# Patient Record
Sex: MALE | Race: ASIAN | Employment: OTHER | URBAN - METROPOLITAN AREA
[De-identification: names, ages, dates, MRNs, and addresses within clinical notes are randomized per-mention and may not be internally consistent; named-entity substitution may affect disease eponyms.]

---

## 2017-01-01 ENCOUNTER — APPOINTMENT (OUTPATIENT)
Dept: CARDIOLOGY | Facility: CLINIC | Age: 82
DRG: 208 | End: 2017-01-01
Attending: ANESTHESIOLOGY
Payer: MEDICARE

## 2017-01-01 ENCOUNTER — ANESTHESIA EVENT (OUTPATIENT)
Dept: INTENSIVE CARE | Facility: CLINIC | Age: 82
DRG: 208 | End: 2017-01-01
Payer: MEDICARE

## 2017-01-01 ENCOUNTER — OFFICE VISIT (OUTPATIENT)
Dept: INTERPRETER SERVICES | Facility: CLINIC | Age: 82
End: 2017-01-01

## 2017-01-01 ENCOUNTER — APPOINTMENT (OUTPATIENT)
Dept: CARDIOLOGY | Facility: CLINIC | Age: 82
DRG: 208 | End: 2017-01-01
Attending: INTERNAL MEDICINE
Payer: MEDICARE

## 2017-01-01 ENCOUNTER — APPOINTMENT (OUTPATIENT)
Dept: SPEECH THERAPY | Facility: CLINIC | Age: 82
DRG: 208 | End: 2017-01-01
Payer: MEDICARE

## 2017-01-01 ENCOUNTER — APPOINTMENT (OUTPATIENT)
Dept: GENERAL RADIOLOGY | Facility: CLINIC | Age: 82
DRG: 208 | End: 2017-01-01
Attending: INTERNAL MEDICINE
Payer: MEDICARE

## 2017-01-01 ENCOUNTER — TRANSFERRED RECORDS (OUTPATIENT)
Dept: HEALTH INFORMATION MANAGEMENT | Facility: CLINIC | Age: 82
End: 2017-01-01

## 2017-01-01 ENCOUNTER — APPOINTMENT (OUTPATIENT)
Dept: SPEECH THERAPY | Facility: CLINIC | Age: 82
DRG: 208 | End: 2017-01-01
Attending: INTERNAL MEDICINE
Payer: MEDICARE

## 2017-01-01 ENCOUNTER — APPOINTMENT (OUTPATIENT)
Dept: GENERAL RADIOLOGY | Facility: CLINIC | Age: 82
DRG: 208 | End: 2017-01-01
Attending: EMERGENCY MEDICINE
Payer: MEDICARE

## 2017-01-01 ENCOUNTER — APPOINTMENT (OUTPATIENT)
Dept: CT IMAGING | Facility: CLINIC | Age: 82
DRG: 208 | End: 2017-01-01
Attending: INTERNAL MEDICINE
Payer: MEDICARE

## 2017-01-01 ENCOUNTER — HOSPITAL ENCOUNTER (INPATIENT)
Facility: CLINIC | Age: 82
LOS: 12 days | DRG: 208 | End: 2017-04-24
Attending: EMERGENCY MEDICINE | Admitting: INTERNAL MEDICINE
Payer: MEDICARE

## 2017-01-01 ENCOUNTER — ALLIED HEALTH/NURSE VISIT (OUTPATIENT)
Dept: NURSING | Facility: CLINIC | Age: 82
End: 2017-01-01
Payer: MEDICARE

## 2017-01-01 ENCOUNTER — HOSPITAL ENCOUNTER (OUTPATIENT)
Dept: CT IMAGING | Facility: CLINIC | Age: 82
Discharge: HOME | End: 2017-03-14
Attending: INTERNAL MEDICINE | Admitting: INTERNAL MEDICINE
Payer: MEDICARE

## 2017-01-01 ENCOUNTER — APPOINTMENT (OUTPATIENT)
Dept: ULTRASOUND IMAGING | Facility: CLINIC | Age: 82
DRG: 208 | End: 2017-01-01
Attending: INTERNAL MEDICINE
Payer: MEDICARE

## 2017-01-01 ENCOUNTER — ANESTHESIA (OUTPATIENT)
Dept: INTENSIVE CARE | Facility: CLINIC | Age: 82
DRG: 208 | End: 2017-01-01
Payer: MEDICARE

## 2017-01-01 VITALS
BODY MASS INDEX: 40.73 KG/M2 | HEART RATE: 92 BPM | RESPIRATION RATE: 20 BRPM | DIASTOLIC BLOOD PRESSURE: 57 MMHG | TEMPERATURE: 100.6 F | SYSTOLIC BLOOD PRESSURE: 113 MMHG | WEIGHT: 207.45 LBS | OXYGEN SATURATION: 97 % | HEIGHT: 60 IN

## 2017-01-01 VITALS — WEIGHT: 183.8 LBS | DIASTOLIC BLOOD PRESSURE: 62 MMHG | SYSTOLIC BLOOD PRESSURE: 124 MMHG | BODY MASS INDEX: 33.61 KG/M2

## 2017-01-01 DIAGNOSIS — R06.9 ABNORMAL BREATHING: ICD-10-CM

## 2017-01-01 DIAGNOSIS — J18.9 PNEUMONIA OF LEFT LOWER LOBE DUE TO INFECTIOUS ORGANISM: ICD-10-CM

## 2017-01-01 DIAGNOSIS — N18.4 CHRONIC RENAL DISEASE, STAGE IV (H): ICD-10-CM

## 2017-01-01 DIAGNOSIS — D64.89 OTHER SPECIFIED ANEMIAS: Primary | ICD-10-CM

## 2017-01-01 DIAGNOSIS — R05.9 COUGH: ICD-10-CM

## 2017-01-01 DIAGNOSIS — Z01.30 BP CHECK: Primary | ICD-10-CM

## 2017-01-01 DIAGNOSIS — I10 HYPERTENSION, ESSENTIAL: ICD-10-CM

## 2017-01-01 DIAGNOSIS — I10 ESSENTIAL HYPERTENSION WITH GOAL BLOOD PRESSURE LESS THAN 140/90: ICD-10-CM

## 2017-01-01 DIAGNOSIS — R79.89 ELEVATED TROPONIN: ICD-10-CM

## 2017-01-01 DIAGNOSIS — R06.00 DYSPNEA: ICD-10-CM

## 2017-01-01 LAB
1,3 BETA GLUCAN SER-MCNC: NORMAL NG/ML
ABO + RH BLD: NORMAL
ABO + RH BLD: NORMAL
ACID FAST STN SPEC QL: NORMAL
ACID FAST STN SPEC QL: NORMAL
ALBUMIN SERPL-MCNC: 2 G/DL (ref 3.4–5)
ALBUMIN SERPL-MCNC: 2.4 G/DL (ref 3.4–5)
ALBUMIN SERPL-MCNC: 3 G/DL (ref 3.4–5)
ALBUMIN SERPL-MCNC: 3.2 G/DL (ref 3.4–5)
ALBUMIN SERPL-MCNC: 3.3 G/DL (ref 3.4–5)
ALBUMIN SERPL-MCNC: 3.3 G/DL (ref 3.4–5)
ALBUMIN UR-MCNC: 100 MG/DL
ALP SERPL-CCNC: 138 U/L (ref 40–150)
ALP SERPL-CCNC: 159 U/L (ref 40–150)
ALP SERPL-CCNC: 165 U/L (ref 40–150)
ALP SERPL-CCNC: 166 U/L (ref 40–150)
ALT SERPL W P-5'-P-CCNC: 1066 U/L (ref 0–70)
ALT SERPL W P-5'-P-CCNC: 966 U/L (ref 0–70)
ALT SERPL W P-5'-P-CCNC: 981 U/L (ref 0–70)
ALT SERPL W P-5'-P-CCNC: 994 U/L (ref 0–70)
ANA SER QL IA: NORMAL
ANCA IGG TITR SER IF: NORMAL {TITER}
ANION GAP SERPL CALCULATED.3IONS-SCNC: 10 MMOL/L (ref 3–14)
ANION GAP SERPL CALCULATED.3IONS-SCNC: 11 MMOL/L (ref 3–14)
ANION GAP SERPL CALCULATED.3IONS-SCNC: 12 MMOL/L (ref 3–14)
ANION GAP SERPL CALCULATED.3IONS-SCNC: 13 MMOL/L (ref 3–14)
ANION GAP SERPL CALCULATED.3IONS-SCNC: 14 MMOL/L (ref 3–14)
ANION GAP SERPL CALCULATED.3IONS-SCNC: 15 MMOL/L (ref 3–14)
ANION GAP SERPL CALCULATED.3IONS-SCNC: 17 MMOL/L (ref 3–14)
ANION GAP SERPL CALCULATED.3IONS-SCNC: 18 MMOL/L (ref 3–14)
ANION GAP SERPL CALCULATED.3IONS-SCNC: 20 MMOL/L (ref 3–14)
ANION GAP SERPL CALCULATED.3IONS-SCNC: 21 MMOL/L (ref 3–14)
ANION GAP SERPL CALCULATED.3IONS-SCNC: 6 MMOL/L (ref 3–14)
ANION GAP SERPL CALCULATED.3IONS-SCNC: 7 MMOL/L (ref 3–14)
ANION GAP SERPL CALCULATED.3IONS-SCNC: 7 MMOL/L (ref 3–14)
ANION GAP SERPL CALCULATED.3IONS-SCNC: 8 MMOL/L (ref 3–14)
ANION GAP SERPL CALCULATED.3IONS-SCNC: 9 MMOL/L (ref 3–14)
ANISOCYTOSIS BLD QL SMEAR: SLIGHT
APPEARANCE FLD: NORMAL
APPEARANCE UR: ABNORMAL
APTT PPP: 33 SEC (ref 22–37)
APTT PPP: 42 SEC (ref 22–37)
ASPERGILLUS AB TITR SER CF: NORMAL {TITER}
AST SERPL W P-5'-P-CCNC: 2572 U/L (ref 0–45)
AST SERPL W P-5'-P-CCNC: 3467 U/L (ref 0–45)
AST SERPL W P-5'-P-CCNC: 3766 U/L (ref 0–45)
AST SERPL W P-5'-P-CCNC: 4108 U/L (ref 0–45)
B DERMAT AB TITR SER CF: NORMAL {TITER}
B-D GLUCAN INTERPRETATION (1,3): NORMAL
BACTERIA #/AREA URNS HPF: ABNORMAL /HPF
BACTERIA SPEC CULT: ABNORMAL
BACTERIA SPEC CULT: NO GROWTH
BASE DEFICIT BLDA-SCNC: 10.7 MMOL/L
BASE DEFICIT BLDA-SCNC: 11.3 MMOL/L
BASE DEFICIT BLDA-SCNC: 12.2 MMOL/L
BASE DEFICIT BLDA-SCNC: 12.3 MMOL/L
BASE DEFICIT BLDA-SCNC: 16.5 MMOL/L
BASE DEFICIT BLDA-SCNC: 6.4 MMOL/L
BASE DEFICIT BLDA-SCNC: 6.5 MMOL/L
BASE DEFICIT BLDA-SCNC: 6.5 MMOL/L
BASE DEFICIT BLDA-SCNC: 8.6 MMOL/L
BASE DEFICIT BLDA-SCNC: 8.8 MMOL/L
BASE DEFICIT BLDV-SCNC: 1.8 MMOL/L
BASE EXCESS BLDV CALC-SCNC: 0.4 MMOL/L
BASOPHILS # BLD AUTO: 0 10E9/L (ref 0–0.2)
BASOPHILS NFR BLD AUTO: 0 %
BASOPHILS NFR BLD AUTO: 0.1 %
BASOPHILS NFR BLD AUTO: 0.2 %
BASOPHILS NFR FLD MANUAL: 1 %
BILIRUB DIRECT SERPL-MCNC: 0.6 MG/DL (ref 0–0.2)
BILIRUB DIRECT SERPL-MCNC: 0.9 MG/DL (ref 0–0.2)
BILIRUB SERPL-MCNC: 1.1 MG/DL (ref 0.2–1.3)
BILIRUB SERPL-MCNC: 1.2 MG/DL (ref 0.2–1.3)
BILIRUB SERPL-MCNC: 1.5 MG/DL (ref 0.2–1.3)
BILIRUB SERPL-MCNC: 1.5 MG/DL (ref 0.2–1.3)
BILIRUB SERPL-MCNC: 1.6 MG/DL (ref 0.2–1.3)
BILIRUB UR QL STRIP: NEGATIVE
BLD GP AB SCN SERPL QL: NORMAL
BLD PROD TYP BPU: NORMAL
BLD UNIT ID BPU: 0
BLOOD BANK CMNT PATIENT-IMP: NORMAL
BLOOD PRODUCT CODE: NORMAL
BPU ID: NORMAL
BRONCHOSCOPY: NORMAL
BUN SERPL-MCNC: 109 MG/DL (ref 7–30)
BUN SERPL-MCNC: 111 MG/DL (ref 7–30)
BUN SERPL-MCNC: 111 MG/DL (ref 7–30)
BUN SERPL-MCNC: 114 MG/DL (ref 7–30)
BUN SERPL-MCNC: 116 MG/DL (ref 7–30)
BUN SERPL-MCNC: 126 MG/DL (ref 7–30)
BUN SERPL-MCNC: 129 MG/DL (ref 7–30)
BUN SERPL-MCNC: 65 MG/DL (ref 7–30)
BUN SERPL-MCNC: 83 MG/DL (ref 7–30)
BUN SERPL-MCNC: 84 MG/DL (ref 7–30)
BUN SERPL-MCNC: 85 MG/DL (ref 7–30)
BUN SERPL-MCNC: 86 MG/DL (ref 7–30)
BUN SERPL-MCNC: 86 MG/DL (ref 7–30)
BUN SERPL-MCNC: 87 MG/DL (ref 7–30)
BUN SERPL-MCNC: 88 MG/DL (ref 7–30)
BUN SERPL-MCNC: 90 MG/DL (ref 7–30)
BUN SERPL-MCNC: 93 MG/DL (ref 7–30)
BUN SERPL-MCNC: 93 MG/DL (ref 7–30)
BUN SERPL-MCNC: 97 MG/DL (ref 7–30)
BUN SERPL-MCNC: 98 MG/DL (ref 7–30)
BUN SERPL-MCNC: 98 MG/DL (ref 7–30)
CALCIT SERPL-MCNC: 17.4 NG/L
CALCIUM SERPL-MCNC: 6.2 MG/DL (ref 8.5–10.1)
CALCIUM SERPL-MCNC: 6.3 MG/DL (ref 8.5–10.1)
CALCIUM SERPL-MCNC: 6.4 MG/DL (ref 8.5–10.1)
CALCIUM SERPL-MCNC: 6.7 MG/DL (ref 8.5–10.1)
CALCIUM SERPL-MCNC: 7.4 MG/DL (ref 8.5–10.1)
CALCIUM SERPL-MCNC: 7.8 MG/DL (ref 8.5–10.1)
CALCIUM SERPL-MCNC: 7.9 MG/DL (ref 8.5–10.1)
CALCIUM SERPL-MCNC: 8.2 MG/DL (ref 8.5–10.1)
CALCIUM SERPL-MCNC: 8.3 MG/DL (ref 8.5–10.1)
CALCIUM SERPL-MCNC: 8.4 MG/DL (ref 8.5–10.1)
CALCIUM SERPL-MCNC: 8.5 MG/DL (ref 8.5–10.1)
CALCIUM SERPL-MCNC: 8.5 MG/DL (ref 8.5–10.1)
CALCIUM SERPL-MCNC: 8.6 MG/DL (ref 8.5–10.1)
CALCIUM SERPL-MCNC: 8.6 MG/DL (ref 8.5–10.1)
CALCIUM SERPL-MCNC: 8.7 MG/DL (ref 8.5–10.1)
CALCIUM SERPL-MCNC: 8.8 MG/DL (ref 8.5–10.1)
CALCIUM SERPL-MCNC: 9.2 MG/DL (ref 8.5–10.1)
CALCIUM SERPL-MCNC: 9.3 MG/DL (ref 8.5–10.1)
CALCIUM SERPL-MCNC: 9.5 MG/DL (ref 8.5–10.1)
CHLORIDE SERPL-SCNC: 107 MMOL/L (ref 94–109)
CHLORIDE SERPL-SCNC: 108 MMOL/L (ref 94–109)
CHLORIDE SERPL-SCNC: 109 MMOL/L (ref 94–109)
CHLORIDE SERPL-SCNC: 110 MMOL/L (ref 94–109)
CHLORIDE SERPL-SCNC: 111 MMOL/L (ref 94–109)
CHLORIDE SERPL-SCNC: 112 MMOL/L (ref 94–109)
CHLORIDE SERPL-SCNC: 113 MMOL/L (ref 94–109)
CHLORIDE SERPL-SCNC: 114 MMOL/L (ref 94–109)
CHLORIDE SERPL-SCNC: 117 MMOL/L (ref 94–109)
CK SERPL-CCNC: 287 U/L (ref 30–300)
CO2 SERPL-SCNC: 13 MMOL/L (ref 20–32)
CO2 SERPL-SCNC: 14 MMOL/L (ref 20–32)
CO2 SERPL-SCNC: 15 MMOL/L (ref 20–32)
CO2 SERPL-SCNC: 16 MMOL/L (ref 20–32)
CO2 SERPL-SCNC: 19 MMOL/L (ref 20–32)
CO2 SERPL-SCNC: 19 MMOL/L (ref 20–32)
CO2 SERPL-SCNC: 20 MMOL/L (ref 20–32)
CO2 SERPL-SCNC: 21 MMOL/L (ref 20–32)
CO2 SERPL-SCNC: 21 MMOL/L (ref 20–32)
CO2 SERPL-SCNC: 22 MMOL/L (ref 20–32)
CO2 SERPL-SCNC: 22 MMOL/L (ref 20–32)
CO2 SERPL-SCNC: 23 MMOL/L (ref 20–32)
CO2 SERPL-SCNC: 23 MMOL/L (ref 20–32)
CO2 SERPL-SCNC: 24 MMOL/L (ref 20–32)
CO2 SERPL-SCNC: 25 MMOL/L (ref 20–32)
CO2 SERPL-SCNC: 25 MMOL/L (ref 20–32)
COCCIDIOIDES AB TITR SER CF: NORMAL {TITER}
COLOR FLD: NORMAL
COLOR UR AUTO: YELLOW
COPATH REPORT: NORMAL
COPATH REPORT: NORMAL
CREAT SERPL-MCNC: 2.51 MG/DL (ref 0.66–1.25)
CREAT SERPL-MCNC: 2.61 MG/DL (ref 0.66–1.25)
CREAT SERPL-MCNC: 2.67 MG/DL (ref 0.66–1.25)
CREAT SERPL-MCNC: 2.77 MG/DL (ref 0.66–1.25)
CREAT SERPL-MCNC: 2.9 MG/DL (ref 0.66–1.25)
CREAT SERPL-MCNC: 3.09 MG/DL (ref 0.66–1.25)
CREAT SERPL-MCNC: 3.49 MG/DL (ref 0.66–1.25)
CREAT SERPL-MCNC: 3.82 MG/DL (ref 0.66–1.25)
CREAT SERPL-MCNC: 3.88 MG/DL (ref 0.66–1.25)
CREAT SERPL-MCNC: 4.18 MG/DL (ref 0.66–1.25)
CREAT SERPL-MCNC: 4.53 MG/DL (ref 0.66–1.25)
CREAT SERPL-MCNC: 4.94 MG/DL (ref 0.66–1.25)
CREAT SERPL-MCNC: 5.03 MG/DL (ref 0.66–1.25)
CREAT SERPL-MCNC: 5.64 MG/DL (ref 0.66–1.25)
CREAT SERPL-MCNC: 6.4 MG/DL (ref 0.66–1.25)
CREAT SERPL-MCNC: 6.49 MG/DL (ref 0.66–1.25)
CREAT SERPL-MCNC: 6.49 MG/DL (ref 0.66–1.25)
CREAT SERPL-MCNC: 6.63 MG/DL (ref 0.66–1.25)
CREAT SERPL-MCNC: 7.11 MG/DL (ref 0.66–1.25)
CREAT SERPL-MCNC: 8.13 MG/DL (ref 0.66–1.25)
CREAT SERPL-MCNC: 8.94 MG/DL (ref 0.66–1.25)
CRP SERPL-MCNC: 233 MG/L (ref 0–8)
D DIMER PPP FEU-MCNC: 0.6 UG/ML FEU (ref 0–0.5)
DIFFERENTIAL METHOD BLD: ABNORMAL
EOSINOPHIL # BLD AUTO: 0 10E9/L (ref 0–0.7)
EOSINOPHIL # BLD AUTO: 0.5 10E9/L (ref 0–0.7)
EOSINOPHIL # BLD AUTO: 0.5 10E9/L (ref 0–0.7)
EOSINOPHIL # BLD AUTO: 0.6 10E9/L (ref 0–0.7)
EOSINOPHIL # BLD AUTO: 0.8 10E9/L (ref 0–0.7)
EOSINOPHIL NFR BLD AUTO: 0 %
EOSINOPHIL NFR BLD AUTO: 3.6 %
EOSINOPHIL NFR BLD AUTO: 4.1 %
EOSINOPHIL NFR BLD AUTO: 4.9 %
EOSINOPHIL NFR BLD AUTO: 5.3 %
EOSINOPHIL NFR FLD MANUAL: 4 %
ERYTHROCYTE [DISTWIDTH] IN BLOOD BY AUTOMATED COUNT: 16.5 % (ref 10–15)
ERYTHROCYTE [DISTWIDTH] IN BLOOD BY AUTOMATED COUNT: 16.5 % (ref 10–15)
ERYTHROCYTE [DISTWIDTH] IN BLOOD BY AUTOMATED COUNT: 16.9 % (ref 10–15)
ERYTHROCYTE [DISTWIDTH] IN BLOOD BY AUTOMATED COUNT: 17 % (ref 10–15)
ERYTHROCYTE [DISTWIDTH] IN BLOOD BY AUTOMATED COUNT: 17.2 % (ref 10–15)
ERYTHROCYTE [DISTWIDTH] IN BLOOD BY AUTOMATED COUNT: 17.3 % (ref 10–15)
ERYTHROCYTE [DISTWIDTH] IN BLOOD BY AUTOMATED COUNT: 17.4 % (ref 10–15)
ERYTHROCYTE [DISTWIDTH] IN BLOOD BY AUTOMATED COUNT: 17.4 % (ref 10–15)
ERYTHROCYTE [DISTWIDTH] IN BLOOD BY AUTOMATED COUNT: 17.6 % (ref 10–15)
ERYTHROCYTE [DISTWIDTH] IN BLOOD BY AUTOMATED COUNT: 17.8 % (ref 10–15)
ERYTHROCYTE [DISTWIDTH] IN BLOOD BY AUTOMATED COUNT: 17.9 % (ref 10–15)
ERYTHROCYTE [DISTWIDTH] IN BLOOD BY AUTOMATED COUNT: 17.9 % (ref 10–15)
ERYTHROCYTE [SEDIMENTATION RATE] IN BLOOD BY WESTERGREN METHOD: ABNORMAL MM/H (ref 0–20)
FERRITIN SERPL-MCNC: 506 NG/ML (ref 26–388)
FIBRINOGEN PPP-MCNC: 528 MG/DL (ref 200–420)
FUNGUS SPEC CULT: ABNORMAL
GALACTOMANNAN AG SERPL QL IA: NORMAL
GALACTOMANNAN AG SERPL-ACNC: 0.05
GBM IGG SER IA-ACNC: NORMAL AI (ref 0–0.9)
GFR SERPL CREATININE-BSD FRML MDRD: 10 ML/MIN/1.7M2
GFR SERPL CREATININE-BSD FRML MDRD: 11 ML/MIN/1.7M2
GFR SERPL CREATININE-BSD FRML MDRD: 11 ML/MIN/1.7M2
GFR SERPL CREATININE-BSD FRML MDRD: 13 ML/MIN/1.7M2
GFR SERPL CREATININE-BSD FRML MDRD: 14 ML/MIN/1.7M2
GFR SERPL CREATININE-BSD FRML MDRD: 15 ML/MIN/1.7M2
GFR SERPL CREATININE-BSD FRML MDRD: 15 ML/MIN/1.7M2
GFR SERPL CREATININE-BSD FRML MDRD: 17 ML/MIN/1.7M2
GFR SERPL CREATININE-BSD FRML MDRD: 20 ML/MIN/1.7M2
GFR SERPL CREATININE-BSD FRML MDRD: 21 ML/MIN/1.7M2
GFR SERPL CREATININE-BSD FRML MDRD: 22 ML/MIN/1.7M2
GFR SERPL CREATININE-BSD FRML MDRD: 23 ML/MIN/1.7M2
GFR SERPL CREATININE-BSD FRML MDRD: 24 ML/MIN/1.7M2
GFR SERPL CREATININE-BSD FRML MDRD: 25 ML/MIN/1.7M2
GFR SERPL CREATININE-BSD FRML MDRD: 6 ML/MIN/1.7M2
GFR SERPL CREATININE-BSD FRML MDRD: 6 ML/MIN/1.7M2
GFR SERPL CREATININE-BSD FRML MDRD: 7 ML/MIN/1.7M2
GFR SERPL CREATININE-BSD FRML MDRD: 8 ML/MIN/1.7M2
GLUCOSE BLDC GLUCOMTR-MCNC: 101 MG/DL (ref 70–99)
GLUCOSE BLDC GLUCOMTR-MCNC: 101 MG/DL (ref 70–99)
GLUCOSE BLDC GLUCOMTR-MCNC: 108 MG/DL (ref 70–99)
GLUCOSE BLDC GLUCOMTR-MCNC: 108 MG/DL (ref 70–99)
GLUCOSE BLDC GLUCOMTR-MCNC: 110 MG/DL (ref 70–99)
GLUCOSE BLDC GLUCOMTR-MCNC: 111 MG/DL (ref 70–99)
GLUCOSE BLDC GLUCOMTR-MCNC: 111 MG/DL (ref 70–99)
GLUCOSE BLDC GLUCOMTR-MCNC: 113 MG/DL (ref 70–99)
GLUCOSE BLDC GLUCOMTR-MCNC: 114 MG/DL (ref 70–99)
GLUCOSE BLDC GLUCOMTR-MCNC: 117 MG/DL (ref 70–99)
GLUCOSE BLDC GLUCOMTR-MCNC: 119 MG/DL (ref 70–99)
GLUCOSE BLDC GLUCOMTR-MCNC: 120 MG/DL (ref 70–99)
GLUCOSE BLDC GLUCOMTR-MCNC: 120 MG/DL (ref 70–99)
GLUCOSE BLDC GLUCOMTR-MCNC: 122 MG/DL (ref 70–99)
GLUCOSE BLDC GLUCOMTR-MCNC: 123 MG/DL (ref 70–99)
GLUCOSE BLDC GLUCOMTR-MCNC: 124 MG/DL (ref 70–99)
GLUCOSE BLDC GLUCOMTR-MCNC: 126 MG/DL (ref 70–99)
GLUCOSE BLDC GLUCOMTR-MCNC: 126 MG/DL (ref 70–99)
GLUCOSE BLDC GLUCOMTR-MCNC: 131 MG/DL (ref 70–99)
GLUCOSE BLDC GLUCOMTR-MCNC: 132 MG/DL (ref 70–99)
GLUCOSE BLDC GLUCOMTR-MCNC: 133 MG/DL (ref 70–99)
GLUCOSE BLDC GLUCOMTR-MCNC: 135 MG/DL (ref 70–99)
GLUCOSE BLDC GLUCOMTR-MCNC: 136 MG/DL (ref 70–99)
GLUCOSE BLDC GLUCOMTR-MCNC: 136 MG/DL (ref 70–99)
GLUCOSE BLDC GLUCOMTR-MCNC: 137 MG/DL (ref 70–99)
GLUCOSE BLDC GLUCOMTR-MCNC: 138 MG/DL (ref 70–99)
GLUCOSE BLDC GLUCOMTR-MCNC: 138 MG/DL (ref 70–99)
GLUCOSE BLDC GLUCOMTR-MCNC: 140 MG/DL (ref 70–99)
GLUCOSE BLDC GLUCOMTR-MCNC: 141 MG/DL (ref 70–99)
GLUCOSE BLDC GLUCOMTR-MCNC: 142 MG/DL (ref 70–99)
GLUCOSE BLDC GLUCOMTR-MCNC: 146 MG/DL (ref 70–99)
GLUCOSE BLDC GLUCOMTR-MCNC: 148 MG/DL (ref 70–99)
GLUCOSE BLDC GLUCOMTR-MCNC: 150 MG/DL (ref 70–99)
GLUCOSE BLDC GLUCOMTR-MCNC: 150 MG/DL (ref 70–99)
GLUCOSE BLDC GLUCOMTR-MCNC: 154 MG/DL (ref 70–99)
GLUCOSE BLDC GLUCOMTR-MCNC: 154 MG/DL (ref 70–99)
GLUCOSE BLDC GLUCOMTR-MCNC: 156 MG/DL (ref 70–99)
GLUCOSE BLDC GLUCOMTR-MCNC: 158 MG/DL (ref 70–99)
GLUCOSE BLDC GLUCOMTR-MCNC: 160 MG/DL (ref 70–99)
GLUCOSE BLDC GLUCOMTR-MCNC: 163 MG/DL (ref 70–99)
GLUCOSE BLDC GLUCOMTR-MCNC: 163 MG/DL (ref 70–99)
GLUCOSE BLDC GLUCOMTR-MCNC: 164 MG/DL (ref 70–99)
GLUCOSE BLDC GLUCOMTR-MCNC: 168 MG/DL (ref 70–99)
GLUCOSE BLDC GLUCOMTR-MCNC: 172 MG/DL (ref 70–99)
GLUCOSE BLDC GLUCOMTR-MCNC: 177 MG/DL (ref 70–99)
GLUCOSE BLDC GLUCOMTR-MCNC: 188 MG/DL (ref 70–99)
GLUCOSE BLDC GLUCOMTR-MCNC: 190 MG/DL (ref 70–99)
GLUCOSE BLDC GLUCOMTR-MCNC: 190 MG/DL (ref 70–99)
GLUCOSE BLDC GLUCOMTR-MCNC: 192 MG/DL (ref 70–99)
GLUCOSE BLDC GLUCOMTR-MCNC: 205 MG/DL (ref 70–99)
GLUCOSE BLDC GLUCOMTR-MCNC: 205 MG/DL (ref 70–99)
GLUCOSE BLDC GLUCOMTR-MCNC: 207 MG/DL (ref 70–99)
GLUCOSE BLDC GLUCOMTR-MCNC: 212 MG/DL (ref 70–99)
GLUCOSE BLDC GLUCOMTR-MCNC: 224 MG/DL (ref 70–99)
GLUCOSE BLDC GLUCOMTR-MCNC: 233 MG/DL (ref 70–99)
GLUCOSE BLDC GLUCOMTR-MCNC: 235 MG/DL (ref 70–99)
GLUCOSE BLDC GLUCOMTR-MCNC: 239 MG/DL (ref 70–99)
GLUCOSE BLDC GLUCOMTR-MCNC: 278 MG/DL (ref 70–99)
GLUCOSE BLDC GLUCOMTR-MCNC: 300 MG/DL (ref 70–99)
GLUCOSE BLDC GLUCOMTR-MCNC: 306 MG/DL (ref 70–99)
GLUCOSE BLDC GLUCOMTR-MCNC: 307 MG/DL (ref 70–99)
GLUCOSE BLDC GLUCOMTR-MCNC: 308 MG/DL (ref 70–99)
GLUCOSE BLDC GLUCOMTR-MCNC: 328 MG/DL (ref 70–99)
GLUCOSE SERPL-MCNC: 115 MG/DL (ref 70–99)
GLUCOSE SERPL-MCNC: 116 MG/DL (ref 70–99)
GLUCOSE SERPL-MCNC: 120 MG/DL (ref 70–99)
GLUCOSE SERPL-MCNC: 124 MG/DL (ref 70–99)
GLUCOSE SERPL-MCNC: 128 MG/DL (ref 70–99)
GLUCOSE SERPL-MCNC: 130 MG/DL (ref 70–99)
GLUCOSE SERPL-MCNC: 130 MG/DL (ref 70–99)
GLUCOSE SERPL-MCNC: 133 MG/DL (ref 70–99)
GLUCOSE SERPL-MCNC: 160 MG/DL (ref 70–99)
GLUCOSE SERPL-MCNC: 174 MG/DL (ref 70–99)
GLUCOSE SERPL-MCNC: 183 MG/DL (ref 70–99)
GLUCOSE SERPL-MCNC: 194 MG/DL (ref 70–99)
GLUCOSE SERPL-MCNC: 195 MG/DL (ref 70–99)
GLUCOSE SERPL-MCNC: 200 MG/DL (ref 70–99)
GLUCOSE SERPL-MCNC: 208 MG/DL (ref 70–99)
GLUCOSE SERPL-MCNC: 244 MG/DL (ref 70–99)
GLUCOSE SERPL-MCNC: 249 MG/DL (ref 70–99)
GLUCOSE SERPL-MCNC: 272 MG/DL (ref 70–99)
GLUCOSE SERPL-MCNC: 272 MG/DL (ref 70–99)
GLUCOSE SERPL-MCNC: 307 MG/DL (ref 70–99)
GLUCOSE SERPL-MCNC: 338 MG/DL (ref 70–99)
GLUCOSE UR STRIP-MCNC: NEGATIVE MG/DL
GRAM STN SPEC: ABNORMAL
GRAM STN SPEC: NORMAL
GRAM STN SPEC: NORMAL
H CAPSUL MYC AB TITR SER CF: NORMAL {TITER}
H CAPSUL YST AB TITR SER CF: NORMAL {TITER}
HAPTOGLOB SERPL-MCNC: 213 MG/DL (ref 35–175)
HBA1C MFR BLD: 7.2 % (ref 4.3–6)
HCO3 BLD-SCNC: 13 MMOL/L (ref 21–28)
HCO3 BLD-SCNC: 14 MMOL/L (ref 21–28)
HCO3 BLD-SCNC: 14 MMOL/L (ref 21–28)
HCO3 BLD-SCNC: 15 MMOL/L (ref 21–28)
HCO3 BLD-SCNC: 16 MMOL/L (ref 21–28)
HCO3 BLD-SCNC: 18 MMOL/L (ref 21–28)
HCO3 BLD-SCNC: 20 MMOL/L (ref 21–28)
HCO3 BLDV-SCNC: 24 MMOL/L (ref 21–28)
HCO3 BLDV-SCNC: 26 MMOL/L (ref 21–28)
HCT VFR BLD AUTO: 19.5 % (ref 40–53)
HCT VFR BLD AUTO: 22.4 % (ref 40–53)
HCT VFR BLD AUTO: 24.7 % (ref 40–53)
HCT VFR BLD AUTO: 24.9 % (ref 40–53)
HCT VFR BLD AUTO: 25.4 % (ref 40–53)
HCT VFR BLD AUTO: 25.7 % (ref 40–53)
HCT VFR BLD AUTO: 25.7 % (ref 40–53)
HCT VFR BLD AUTO: 26.7 % (ref 40–53)
HCT VFR BLD AUTO: 26.8 % (ref 40–53)
HCT VFR BLD AUTO: 26.9 % (ref 40–53)
HCT VFR BLD AUTO: 29.7 % (ref 40–53)
HCT VFR BLD AUTO: 30.6 % (ref 40–53)
HCT VFR BLD AUTO: 31 % (ref 40–53)
HCT VFR BLD AUTO: 31.1 % (ref 40–53)
HEMOCCULT STL QL: POSITIVE
HGB BLD-MCNC: 10 G/DL (ref 13.3–17.7)
HGB BLD-MCNC: 10 G/DL (ref 13.3–17.7)
HGB BLD-MCNC: 6.1 G/DL (ref 13.3–17.7)
HGB BLD-MCNC: 6.7 G/DL (ref 13.3–17.7)
HGB BLD-MCNC: 7.1 G/DL (ref 13.3–17.7)
HGB BLD-MCNC: 7.5 G/DL (ref 13.3–17.7)
HGB BLD-MCNC: 7.8 G/DL (ref 13.3–17.7)
HGB BLD-MCNC: 7.8 G/DL (ref 13.3–17.7)
HGB BLD-MCNC: 8.1 G/DL (ref 13.3–17.7)
HGB BLD-MCNC: 8.1 G/DL (ref 13.3–17.7)
HGB BLD-MCNC: 8.2 G/DL (ref 13.3–17.7)
HGB BLD-MCNC: 8.3 G/DL (ref 13.3–17.7)
HGB BLD-MCNC: 8.4 G/DL (ref 13.3–17.7)
HGB BLD-MCNC: 9 G/DL (ref 13.3–17.7)
HGB BLD-MCNC: 9.4 G/DL (ref 13.3–17.7)
HGB BLD-MCNC: 9.5 G/DL (ref 13.3–17.7)
HGB UR QL STRIP: ABNORMAL
IMM GRANULOCYTES # BLD: 0.1 10E9/L (ref 0–0.4)
IMM GRANULOCYTES # BLD: 0.2 10E9/L (ref 0–0.4)
IMM GRANULOCYTES NFR BLD: 0.4 %
IMM GRANULOCYTES NFR BLD: 0.5 %
IMM GRANULOCYTES NFR BLD: 0.5 %
IMM GRANULOCYTES NFR BLD: 0.6 %
IMM GRANULOCYTES NFR BLD: 0.6 %
IMM GRANULOCYTES NFR BLD: 0.7 %
IMM GRANULOCYTES NFR BLD: 1.1 %
INR PPP: 1.26 (ref 0.86–1.14)
INR PPP: 1.26 (ref 0.86–1.14)
INR PPP: 1.98 (ref 0.86–1.14)
INTERPRETATION ECG - MUSE: NORMAL
IRON SATN MFR SERPL: 8 % (ref 15–46)
IRON SERPL-MCNC: 15 UG/DL (ref 35–180)
KETONES UR STRIP-MCNC: NEGATIVE MG/DL
KOH PREP SPEC: NORMAL
LACTATE BLD-SCNC: 0.5 MMOL/L (ref 0.7–2.1)
LACTATE BLD-SCNC: 0.8 MMOL/L (ref 0.7–2.1)
LACTATE BLD-SCNC: 0.9 MMOL/L (ref 0.7–2.1)
LACTATE BLD-SCNC: 1 MMOL/L (ref 0.7–2.1)
LACTATE BLD-SCNC: 1 MMOL/L (ref 0.7–2.1)
LACTATE BLD-SCNC: 1.1 MMOL/L (ref 0.7–2.1)
LACTATE BLD-SCNC: 1.3 MMOL/L (ref 0.7–2.1)
LACTATE BLD-SCNC: 2 MMOL/L (ref 0.7–2.1)
LACTATE BLD-SCNC: 2 MMOL/L (ref 0.7–2.1)
LACTATE BLD-SCNC: 2.2 MMOL/L (ref 0.7–2.1)
LACTATE BLD-SCNC: 5.4 MMOL/L (ref 0.7–2.1)
LACTATE BLD-SCNC: 7.3 MMOL/L (ref 0.7–2.1)
LACTATE BLD-SCNC: 7.4 MMOL/L (ref 0.7–2.1)
LACTATE BLD-SCNC: 7.8 MMOL/L (ref 0.7–2.1)
LACTATE BLD-SCNC: 8.7 MMOL/L (ref 0.7–2.1)
LDH SERPL L TO P-CCNC: 8161 U/L (ref 85–227)
LEUKOCYTE ESTERASE UR QL STRIP: ABNORMAL
LYMPHOCYTES # BLD AUTO: 0.2 10E9/L (ref 0.8–5.3)
LYMPHOCYTES # BLD AUTO: 0.2 10E9/L (ref 0.8–5.3)
LYMPHOCYTES # BLD AUTO: 0.3 10E9/L (ref 0.8–5.3)
LYMPHOCYTES # BLD AUTO: 0.4 10E9/L (ref 0.8–5.3)
LYMPHOCYTES # BLD AUTO: 0.7 10E9/L (ref 0.8–5.3)
LYMPHOCYTES # BLD AUTO: 0.9 10E9/L (ref 0.8–5.3)
LYMPHOCYTES # BLD AUTO: 1.2 10E9/L (ref 0.8–5.3)
LYMPHOCYTES # BLD AUTO: 1.2 10E9/L (ref 0.8–5.3)
LYMPHOCYTES # BLD AUTO: 1.4 10E9/L (ref 0.8–5.3)
LYMPHOCYTES NFR BLD AUTO: 1.1 %
LYMPHOCYTES NFR BLD AUTO: 1.2 %
LYMPHOCYTES NFR BLD AUTO: 1.4 %
LYMPHOCYTES NFR BLD AUTO: 11.7 %
LYMPHOCYTES NFR BLD AUTO: 2.8 %
LYMPHOCYTES NFR BLD AUTO: 3.3 %
LYMPHOCYTES NFR BLD AUTO: 7.1 %
LYMPHOCYTES NFR BLD AUTO: 8.1 %
LYMPHOCYTES NFR BLD AUTO: 8.4 %
LYMPHOCYTES NFR FLD MANUAL: 3 %
Lab: ABNORMAL
Lab: NORMAL
Lab: NORMAL
M TB TUBERC IFN-G BLD QL: NEGATIVE
M TB TUBERC IFN-G/MITOGEN IGNF BLD: 0 IU/ML
MAGNESIUM SERPL-MCNC: 2.6 MG/DL (ref 1.6–2.3)
MCH RBC QN AUTO: 26 PG (ref 26.5–33)
MCH RBC QN AUTO: 26.6 PG (ref 26.5–33)
MCH RBC QN AUTO: 26.7 PG (ref 26.5–33)
MCH RBC QN AUTO: 26.8 PG (ref 26.5–33)
MCH RBC QN AUTO: 26.8 PG (ref 26.5–33)
MCH RBC QN AUTO: 26.9 PG (ref 26.5–33)
MCH RBC QN AUTO: 27.2 PG (ref 26.5–33)
MCH RBC QN AUTO: 27.2 PG (ref 26.5–33)
MCH RBC QN AUTO: 27.3 PG (ref 26.5–33)
MCH RBC QN AUTO: 27.3 PG (ref 26.5–33)
MCH RBC QN AUTO: 27.6 PG (ref 26.5–33)
MCHC RBC AUTO-ENTMCNC: 30.4 G/DL (ref 31.5–36.5)
MCHC RBC AUTO-ENTMCNC: 30.7 G/DL (ref 31.5–36.5)
MCHC RBC AUTO-ENTMCNC: 31 G/DL (ref 31.5–36.5)
MCHC RBC AUTO-ENTMCNC: 31 G/DL (ref 31.5–36.5)
MCHC RBC AUTO-ENTMCNC: 31.2 G/DL (ref 31.5–36.5)
MCHC RBC AUTO-ENTMCNC: 31.3 G/DL (ref 31.5–36.5)
MCHC RBC AUTO-ENTMCNC: 31.3 G/DL (ref 31.5–36.5)
MCHC RBC AUTO-ENTMCNC: 31.5 G/DL (ref 31.5–36.5)
MCHC RBC AUTO-ENTMCNC: 31.5 G/DL (ref 31.5–36.5)
MCHC RBC AUTO-ENTMCNC: 31.6 G/DL (ref 31.5–36.5)
MCHC RBC AUTO-ENTMCNC: 31.7 G/DL (ref 31.5–36.5)
MCHC RBC AUTO-ENTMCNC: 32.2 G/DL (ref 31.5–36.5)
MCHC RBC AUTO-ENTMCNC: 32.3 G/DL (ref 31.5–36.5)
MCHC RBC AUTO-ENTMCNC: 33.7 G/DL (ref 31.5–36.5)
MCV RBC AUTO: 81 FL (ref 78–100)
MCV RBC AUTO: 84 FL (ref 78–100)
MCV RBC AUTO: 85 FL (ref 78–100)
MCV RBC AUTO: 86 FL (ref 78–100)
MCV RBC AUTO: 87 FL (ref 78–100)
MCV RBC AUTO: 87 FL (ref 78–100)
MICRO REPORT STATUS: ABNORMAL
MICRO REPORT STATUS: NORMAL
MONOCYTES # BLD AUTO: 0.1 10E9/L (ref 0–1.3)
MONOCYTES # BLD AUTO: 0.1 10E9/L (ref 0–1.3)
MONOCYTES # BLD AUTO: 0.6 10E9/L (ref 0–1.3)
MONOCYTES # BLD AUTO: 0.7 10E9/L (ref 0–1.3)
MONOCYTES # BLD AUTO: 0.8 10E9/L (ref 0–1.3)
MONOCYTES # BLD AUTO: 1.1 10E9/L (ref 0–1.3)
MONOCYTES NFR BLD AUTO: 0.6 %
MONOCYTES NFR BLD AUTO: 0.8 %
MONOCYTES NFR BLD AUTO: 3.1 %
MONOCYTES NFR BLD AUTO: 3.4 %
MONOCYTES NFR BLD AUTO: 3.6 %
MONOCYTES NFR BLD AUTO: 5.1 %
MONOCYTES NFR BLD AUTO: 5.6 %
MONOCYTES NFR BLD AUTO: 5.6 %
MONOCYTES NFR BLD AUTO: 6.3 %
MONOS+MACROS NFR FLD MANUAL: 5 %
MRSA DNA SPEC QL NAA+PROBE: NORMAL
MUCOUS THREADS #/AREA URNS LPF: PRESENT /LPF
MYCOBACTERIUM SPEC CULT: NORMAL
NEUTROPHILS # BLD AUTO: 11.7 10E9/L (ref 1.6–8.3)
NEUTROPHILS # BLD AUTO: 13.8 10E9/L (ref 1.6–8.3)
NEUTROPHILS # BLD AUTO: 15 10E9/L (ref 1.6–8.3)
NEUTROPHILS # BLD AUTO: 15.2 10E9/L (ref 1.6–8.3)
NEUTROPHILS # BLD AUTO: 18.4 10E9/L (ref 1.6–8.3)
NEUTROPHILS # BLD AUTO: 18.4 10E9/L (ref 1.6–8.3)
NEUTROPHILS # BLD AUTO: 20.2 10E9/L (ref 1.6–8.3)
NEUTROPHILS # BLD AUTO: 9 10E9/L (ref 1.6–8.3)
NEUTROPHILS # BLD AUTO: 9.1 10E9/L (ref 1.6–8.3)
NEUTROPHILS NFR BLD AUTO: 78 %
NEUTROPHILS NFR BLD AUTO: 80.2 %
NEUTROPHILS NFR BLD AUTO: 81.2 %
NEUTROPHILS NFR BLD AUTO: 82.3 %
NEUTROPHILS NFR BLD AUTO: 92.3 %
NEUTROPHILS NFR BLD AUTO: 94 %
NEUTROPHILS NFR BLD AUTO: 95.2 %
NEUTROPHILS NFR BLD AUTO: 95.7 %
NEUTROPHILS NFR BLD AUTO: 97.7 %
NEUTS BAND NFR FLD MANUAL: 87 %
NITRATE UR QL: NEGATIVE
NRBC # BLD AUTO: 0 10*3/UL
NRBC # BLD AUTO: 0.2 10*3/UL
NRBC # BLD AUTO: 0.2 10*3/UL
NRBC # BLD AUTO: 0.3 10*3/UL
NRBC BLD AUTO-RTO: 0 /100
NRBC BLD AUTO-RTO: 1 /100
NT-PROBNP SERPL-MCNC: 2178 PG/ML (ref 0–1800)
NT-PROBNP SERPL-MCNC: 3055 PG/ML (ref 0–1800)
NUM BPU REQUESTED: 4
O2/TOTAL GAS SETTING VFR VENT: 3 %
O2/TOTAL GAS SETTING VFR VENT: 3 %
O2/TOTAL GAS SETTING VFR VENT: ABNORMAL %
OXYHGB MFR BLD: 85 % (ref 92–100)
OXYHGB MFR BLD: 86 % (ref 92–100)
OXYHGB MFR BLD: 87 % (ref 92–100)
OXYHGB MFR BLD: 89 % (ref 92–100)
OXYHGB MFR BLD: 90 % (ref 92–100)
OXYHGB MFR BLD: 90 % (ref 92–100)
OXYHGB MFR BLD: 93 % (ref 92–100)
OXYHGB MFR BLD: 93 % (ref 92–100)
OXYHGB MFR BLD: 95 % (ref 92–100)
OXYHGB MFR BLD: 96 % (ref 92–100)
OXYHGB MFR BLD: 97 % (ref 92–100)
OXYHGB MFR BLDV: 45 %
OXYHGB MFR BLDV: 80 %
PCO2 BLD: 32 MM HG (ref 35–45)
PCO2 BLD: 35 MM HG (ref 35–45)
PCO2 BLD: 36 MM HG (ref 35–45)
PCO2 BLD: 36 MM HG (ref 35–45)
PCO2 BLD: 37 MM HG (ref 35–45)
PCO2 BLD: 41 MM HG (ref 35–45)
PCO2 BLD: 42 MM HG (ref 35–45)
PCO2 BLD: 44 MM HG (ref 35–45)
PCO2 BLD: 45 MM HG (ref 35–45)
PCO2 BLD: 46 MM HG (ref 35–45)
PCO2 BLD: 59 MM HG (ref 35–45)
PCO2 BLDV: 43 MM HG (ref 40–50)
PCO2 BLDV: 45 MM HG (ref 40–50)
PH BLD: 6.96 PH (ref 7.35–7.45)
PH BLD: 7.21 PH (ref 7.35–7.45)
PH BLD: 7.22 PH (ref 7.35–7.45)
PH BLD: 7.23 PH (ref 7.35–7.45)
PH BLD: 7.24 PH (ref 7.35–7.45)
PH BLD: 7.25 PH (ref 7.35–7.45)
PH BLD: 7.26 PH (ref 7.35–7.45)
PH BLD: 7.29 PH (ref 7.35–7.45)
PH BLD: 7.35 PH (ref 7.35–7.45)
PH BLDV: 7.35 PH (ref 7.32–7.43)
PH BLDV: 7.37 PH (ref 7.32–7.43)
PH UR STRIP: 5 PH (ref 5–7)
PHOSPHATE SERPL-MCNC: 5.4 MG/DL (ref 2.5–4.5)
PHOSPHATE SERPL-MCNC: 5.4 MG/DL (ref 2.5–4.5)
PHOSPHATE SERPL-MCNC: 8.8 MG/DL (ref 2.5–4.5)
PLATELET # BLD AUTO: 120 10E9/L (ref 150–450)
PLATELET # BLD AUTO: 147 10E9/L (ref 150–450)
PLATELET # BLD AUTO: 148 10E9/L (ref 150–450)
PLATELET # BLD AUTO: 174 10E9/L (ref 150–450)
PLATELET # BLD AUTO: 177 10E9/L (ref 150–450)
PLATELET # BLD AUTO: 177 10E9/L (ref 150–450)
PLATELET # BLD AUTO: 185 10E9/L (ref 150–450)
PLATELET # BLD AUTO: 187 10E9/L (ref 150–450)
PLATELET # BLD AUTO: 187 10E9/L (ref 150–450)
PLATELET # BLD AUTO: 191 10E9/L (ref 150–450)
PLATELET # BLD AUTO: 200 10E9/L (ref 150–450)
PLATELET # BLD AUTO: 203 10E9/L (ref 150–450)
PLATELET # BLD AUTO: 222 10E9/L (ref 150–450)
PLATELET # BLD AUTO: 225 10E9/L (ref 150–450)
PLATELET # BLD EST: NORMAL 10*3/UL
PO2 BLD: 111 MM HG (ref 80–105)
PO2 BLD: 142 MM HG (ref 80–105)
PO2 BLD: 57 MM HG (ref 80–105)
PO2 BLD: 67 MM HG (ref 80–105)
PO2 BLD: 68 MM HG (ref 80–105)
PO2 BLD: 69 MM HG (ref 80–105)
PO2 BLD: 70 MM HG (ref 80–105)
PO2 BLD: 72 MM HG (ref 80–105)
PO2 BLD: 87 MM HG (ref 80–105)
PO2 BLD: 89 MM HG (ref 80–105)
PO2 BLD: 89 MM HG (ref 80–105)
PO2 BLDV: 28 MM HG (ref 25–47)
PO2 BLDV: 49 MM HG (ref 25–47)
POTASSIUM SERPL-SCNC: 4.2 MMOL/L (ref 3.4–5.3)
POTASSIUM SERPL-SCNC: 4.3 MMOL/L (ref 3.4–5.3)
POTASSIUM SERPL-SCNC: 4.3 MMOL/L (ref 3.4–5.3)
POTASSIUM SERPL-SCNC: 4.4 MMOL/L (ref 3.4–5.3)
POTASSIUM SERPL-SCNC: 4.5 MMOL/L (ref 3.4–5.3)
POTASSIUM SERPL-SCNC: 4.7 MMOL/L (ref 3.4–5.3)
POTASSIUM SERPL-SCNC: 4.8 MMOL/L (ref 3.4–5.3)
POTASSIUM SERPL-SCNC: 4.9 MMOL/L (ref 3.4–5.3)
POTASSIUM SERPL-SCNC: 4.9 MMOL/L (ref 3.4–5.3)
POTASSIUM SERPL-SCNC: 5 MMOL/L (ref 3.4–5.3)
POTASSIUM SERPL-SCNC: 5.1 MMOL/L (ref 3.4–5.3)
POTASSIUM SERPL-SCNC: 5.1 MMOL/L (ref 3.4–5.3)
POTASSIUM SERPL-SCNC: 5.2 MMOL/L (ref 3.4–5.3)
POTASSIUM SERPL-SCNC: 5.3 MMOL/L (ref 3.4–5.3)
POTASSIUM SERPL-SCNC: 5.3 MMOL/L (ref 3.4–5.3)
POTASSIUM SERPL-SCNC: 5.4 MMOL/L (ref 3.4–5.3)
POTASSIUM SERPL-SCNC: 5.4 MMOL/L (ref 3.4–5.3)
POTASSIUM SERPL-SCNC: 5.6 MMOL/L (ref 3.4–5.3)
POTASSIUM SERPL-SCNC: 5.7 MMOL/L (ref 3.4–5.3)
PROCALCITONIN SERPL-MCNC: 0.5 NG/ML
PROCALCITONIN SERPL-MCNC: 1.65 NG/ML
PROT SERPL-MCNC: 6.3 G/DL (ref 6.8–8.8)
PROT SERPL-MCNC: 7.1 G/DL (ref 6.8–8.8)
PROT SERPL-MCNC: 7.3 G/DL (ref 6.8–8.8)
PROT SERPL-MCNC: 7.4 G/DL (ref 6.8–8.8)
RBC # BLD AUTO: 2.29 10E12/L (ref 4.4–5.9)
RBC # BLD AUTO: 2.67 10E12/L (ref 4.4–5.9)
RBC # BLD AUTO: 2.88 10E12/L (ref 4.4–5.9)
RBC # BLD AUTO: 2.9 10E12/L (ref 4.4–5.9)
RBC # BLD AUTO: 2.92 10E12/L (ref 4.4–5.9)
RBC # BLD AUTO: 2.97 10E12/L (ref 4.4–5.9)
RBC # BLD AUTO: 3.02 10E12/L (ref 4.4–5.9)
RBC # BLD AUTO: 3.12 10E12/L (ref 4.4–5.9)
RBC # BLD AUTO: 3.16 10E12/L (ref 4.4–5.9)
RBC # BLD AUTO: 3.3 10E12/L (ref 4.4–5.9)
RBC # BLD AUTO: 3.46 10E12/L (ref 4.4–5.9)
RBC # BLD AUTO: 3.55 10E12/L (ref 4.4–5.9)
RBC # BLD AUTO: 3.62 10E12/L (ref 4.4–5.9)
RBC # BLD AUTO: 3.67 10E12/L (ref 4.4–5.9)
RBC #/AREA URNS AUTO: 83 /HPF (ref 0–2)
RBC INCLUSIONS BLD: SLIGHT
RETICS # AUTO: 36.4 10E9/L (ref 25–95)
RETICS/RBC NFR AUTO: 1.3 % (ref 0.5–2)
RHEUMATOID FACT SER NEPH-ACNC: 20 IU/ML (ref 0–20)
SODIUM SERPL-SCNC: 139 MMOL/L (ref 133–144)
SODIUM SERPL-SCNC: 140 MMOL/L (ref 133–144)
SODIUM SERPL-SCNC: 141 MMOL/L (ref 133–144)
SODIUM SERPL-SCNC: 142 MMOL/L (ref 133–144)
SODIUM SERPL-SCNC: 142 MMOL/L (ref 133–144)
SODIUM SERPL-SCNC: 143 MMOL/L (ref 133–144)
SODIUM SERPL-SCNC: 144 MMOL/L (ref 133–144)
SODIUM SERPL-SCNC: 145 MMOL/L (ref 133–144)
SODIUM SERPL-SCNC: 146 MMOL/L (ref 133–144)
SODIUM SERPL-SCNC: 148 MMOL/L (ref 133–144)
SP GR UR STRIP: 1.02 (ref 1–1.03)
SPECIMEN EXP DATE BLD: NORMAL
SPECIMEN SOURCE FLD: NORMAL
SPECIMEN SOURCE: ABNORMAL
SPECIMEN SOURCE: NORMAL
SQUAMOUS #/AREA URNS AUTO: 1 /HPF (ref 0–1)
TIBC SERPL-MCNC: 186 UG/DL (ref 240–430)
TRANSFUSION STATUS PATIENT QL: NORMAL
TROPONIN I SERPL-MCNC: 0.14 UG/L (ref 0–0.04)
TROPONIN I SERPL-MCNC: 0.15 UG/L (ref 0–0.04)
TROPONIN I SERPL-MCNC: 0.16 UG/L (ref 0–0.04)
TROPONIN I SERPL-MCNC: 0.16 UG/L (ref 0–0.04)
TROPONIN I SERPL-MCNC: 0.17 UG/L (ref 0–0.04)
TROPONIN I SERPL-MCNC: 0.19 UG/L (ref 0–0.04)
TROPONIN I SERPL-MCNC: 0.21 UG/L (ref 0–0.04)
TROPONIN I SERPL-MCNC: 0.22 UG/L (ref 0–0.04)
TROPONIN I SERPL-MCNC: 0.22 UG/L (ref 0–0.04)
TROPONIN I SERPL-MCNC: 0.24 UG/L (ref 0–0.04)
TROPONIN I SERPL-MCNC: 0.25 UG/L (ref 0–0.04)
TROPONIN I SERPL-MCNC: 0.52 UG/L (ref 0–0.04)
TROPONIN I SERPL-MCNC: 0.75 UG/L (ref 0–0.04)
URATE CRY #/AREA URNS HPF: ABNORMAL /HPF
URN SPEC COLLECT METH UR: ABNORMAL
UROBILINOGEN UR STRIP-MCNC: 0 MG/DL (ref 0–2)
WBC # BLD AUTO: 11.3 10E9/L (ref 4–11)
WBC # BLD AUTO: 11.7 10E9/L (ref 4–11)
WBC # BLD AUTO: 11.7 10E9/L (ref 4–11)
WBC # BLD AUTO: 12.5 10E9/L (ref 4–11)
WBC # BLD AUTO: 13.6 10E9/L (ref 4–11)
WBC # BLD AUTO: 14.6 10E9/L (ref 4–11)
WBC # BLD AUTO: 15.6 10E9/L (ref 4–11)
WBC # BLD AUTO: 15.6 10E9/L (ref 4–11)
WBC # BLD AUTO: 16.7 10E9/L (ref 4–11)
WBC # BLD AUTO: 19.1 10E9/L (ref 4–11)
WBC # BLD AUTO: 19.5 10E9/L (ref 4–11)
WBC # BLD AUTO: 19.6 10E9/L (ref 4–11)
WBC # BLD AUTO: 20 10E9/L (ref 4–11)
WBC # BLD AUTO: 21 10E9/L (ref 4–11)
WBC # FLD AUTO: 507 /UL
WBC #/AREA URNS AUTO: 3 /HPF (ref 0–2)

## 2017-01-01 PROCEDURE — 84100 ASSAY OF PHOSPHORUS: CPT | Performed by: INTERNAL MEDICINE

## 2017-01-01 PROCEDURE — 25000132 ZZH RX MED GY IP 250 OP 250 PS 637: Mod: GY | Performed by: INTERNAL MEDICINE

## 2017-01-01 PROCEDURE — 84145 PROCALCITONIN (PCT): CPT | Performed by: INTERNAL MEDICINE

## 2017-01-01 PROCEDURE — 80048 BASIC METABOLIC PNL TOTAL CA: CPT | Performed by: INTERNAL MEDICINE

## 2017-01-01 PROCEDURE — 12000007 ZZH R&B INTERMEDIATE

## 2017-01-01 PROCEDURE — S0171 BUMETANIDE 0.5 MG: HCPCS | Performed by: INTERNAL MEDICINE

## 2017-01-01 PROCEDURE — 25000128 H RX IP 250 OP 636: Performed by: INTERNAL MEDICINE

## 2017-01-01 PROCEDURE — 40000275 ZZH STATISTIC RCP TIME EA 10 MIN

## 2017-01-01 PROCEDURE — 99233 SBSQ HOSP IP/OBS HIGH 50: CPT | Performed by: INTERNAL MEDICINE

## 2017-01-01 PROCEDURE — 94644 CONT INHLJ TX 1ST HOUR: CPT

## 2017-01-01 PROCEDURE — 94640 AIRWAY INHALATION TREATMENT: CPT

## 2017-01-01 PROCEDURE — 82805 BLOOD GASES W/O2 SATURATION: CPT | Performed by: ANESTHESIOLOGY

## 2017-01-01 PROCEDURE — 94640 AIRWAY INHALATION TREATMENT: CPT | Mod: 76

## 2017-01-01 PROCEDURE — 80069 RENAL FUNCTION PANEL: CPT | Performed by: INTERNAL MEDICINE

## 2017-01-01 PROCEDURE — 85027 COMPLETE CBC AUTOMATED: CPT | Performed by: INTERNAL MEDICINE

## 2017-01-01 PROCEDURE — P9041 ALBUMIN (HUMAN),5%, 50ML: HCPCS

## 2017-01-01 PROCEDURE — 80053 COMPREHEN METABOLIC PANEL: CPT | Performed by: INTERNAL MEDICINE

## 2017-01-01 PROCEDURE — 40000274 ZZH STATISTIC RCP CONSULT EA 30 MIN

## 2017-01-01 PROCEDURE — 85045 AUTOMATED RETICULOCYTE COUNT: CPT | Performed by: INTERNAL MEDICINE

## 2017-01-01 PROCEDURE — 36415 COLL VENOUS BLD VENIPUNCTURE: CPT | Performed by: INTERNAL MEDICINE

## 2017-01-01 PROCEDURE — 86635 COCCIDIOIDES ANTIBODY: CPT | Performed by: INTERNAL MEDICINE

## 2017-01-01 PROCEDURE — S0169 CALCITROL: HCPCS | Mod: GY | Performed by: INTERNAL MEDICINE

## 2017-01-01 PROCEDURE — 82040 ASSAY OF SERUM ALBUMIN: CPT | Performed by: INTERNAL MEDICINE

## 2017-01-01 PROCEDURE — 87205 SMEAR GRAM STAIN: CPT | Performed by: INTERNAL MEDICINE

## 2017-01-01 PROCEDURE — 40000225 ZZH STATISTIC SLP WARD VISIT

## 2017-01-01 PROCEDURE — A9270 NON-COVERED ITEM OR SERVICE: HCPCS | Mod: GY | Performed by: INTERNAL MEDICINE

## 2017-01-01 PROCEDURE — 31624 DX BRONCHOSCOPE/LAVAGE: CPT | Performed by: INTERNAL MEDICINE

## 2017-01-01 PROCEDURE — 81001 URINALYSIS AUTO W/SCOPE: CPT | Performed by: INTERNAL MEDICINE

## 2017-01-01 PROCEDURE — 99207 ZZC NO CHARGE NURSE ONLY: CPT

## 2017-01-01 PROCEDURE — 40000281 ZZH STATISTIC TRANSPORT TIME EA 15 MIN

## 2017-01-01 PROCEDURE — 92526 ORAL FUNCTION THERAPY: CPT | Mod: GN

## 2017-01-01 PROCEDURE — 86698 HISTOPLASMA ANTIBODY: CPT | Performed by: INTERNAL MEDICINE

## 2017-01-01 PROCEDURE — 87186 SC STD MICRODIL/AGAR DIL: CPT | Performed by: ANESTHESIOLOGY

## 2017-01-01 PROCEDURE — 87205 SMEAR GRAM STAIN: CPT

## 2017-01-01 PROCEDURE — 87641 MR-STAPH DNA AMP PROBE: CPT | Performed by: INTERNAL MEDICINE

## 2017-01-01 PROCEDURE — 94003 VENT MGMT INPAT SUBQ DAY: CPT

## 2017-01-01 PROCEDURE — 87640 STAPH A DNA AMP PROBE: CPT | Performed by: INTERNAL MEDICINE

## 2017-01-01 PROCEDURE — 25000125 ZZHC RX 250: Performed by: ANESTHESIOLOGY

## 2017-01-01 PROCEDURE — 85025 COMPLETE CBC W/AUTO DIFF WBC: CPT | Performed by: INTERNAL MEDICINE

## 2017-01-01 PROCEDURE — 40000986 XR CHEST PORT 1 VW

## 2017-01-01 PROCEDURE — 25000125 ZZHC RX 250: Performed by: INTERNAL MEDICINE

## 2017-01-01 PROCEDURE — 86255 FLUORESCENT ANTIBODY SCREEN: CPT | Performed by: INTERNAL MEDICINE

## 2017-01-01 PROCEDURE — 85610 PROTHROMBIN TIME: CPT | Performed by: INTERNAL MEDICINE

## 2017-01-01 PROCEDURE — 25000128 H RX IP 250 OP 636: Performed by: ANESTHESIOLOGY

## 2017-01-01 PROCEDURE — T1013 SIGN LANG/ORAL INTERPRETER: HCPCS | Mod: U3

## 2017-01-01 PROCEDURE — 87070 CULTURE OTHR SPECIMN AEROBIC: CPT | Performed by: INTERNAL MEDICINE

## 2017-01-01 PROCEDURE — 87206 SMEAR FLUORESCENT/ACID STAI: CPT | Performed by: INTERNAL MEDICINE

## 2017-01-01 PROCEDURE — 82550 ASSAY OF CK (CPK): CPT | Performed by: ANESTHESIOLOGY

## 2017-01-01 PROCEDURE — 86923 COMPATIBILITY TEST ELECTRIC: CPT | Performed by: INTERNAL MEDICINE

## 2017-01-01 PROCEDURE — 83605 ASSAY OF LACTIC ACID: CPT | Performed by: INTERNAL MEDICINE

## 2017-01-01 PROCEDURE — 86606 ASPERGILLUS ANTIBODY: CPT | Performed by: INTERNAL MEDICINE

## 2017-01-01 PROCEDURE — 80048 BASIC METABOLIC PNL TOTAL CA: CPT | Performed by: NURSE PRACTITIONER

## 2017-01-01 PROCEDURE — P9041 ALBUMIN (HUMAN),5%, 50ML: HCPCS | Performed by: INTERNAL MEDICINE

## 2017-01-01 PROCEDURE — 87077 CULTURE AEROBIC IDENTIFY: CPT | Performed by: ANESTHESIOLOGY

## 2017-01-01 PROCEDURE — 40000847 ZZHCL STATISTIC MORPHOLOGY W/INTERP HISTOLOGY TC 85060: Performed by: ANESTHESIOLOGY

## 2017-01-01 PROCEDURE — 86900 BLOOD TYPING SEROLOGIC ABO: CPT | Performed by: ANESTHESIOLOGY

## 2017-01-01 PROCEDURE — 82248 BILIRUBIN DIRECT: CPT | Performed by: INTERNAL MEDICINE

## 2017-01-01 PROCEDURE — 84484 ASSAY OF TROPONIN QUANT: CPT | Performed by: INTERNAL MEDICINE

## 2017-01-01 PROCEDURE — 99285 EMERGENCY DEPT VISIT HI MDM: CPT | Mod: 25

## 2017-01-01 PROCEDURE — 89051 BODY FLUID CELL COUNT: CPT | Performed by: INTERNAL MEDICINE

## 2017-01-01 PROCEDURE — 00000146 ZZHCL STATISTIC GLUCOSE BY METER IP

## 2017-01-01 PROCEDURE — 25500064 ZZH RX 255 OP 636: Performed by: INTERNAL MEDICINE

## 2017-01-01 PROCEDURE — 25000125 ZZHC RX 250

## 2017-01-01 PROCEDURE — 99291 CRITICAL CARE FIRST HOUR: CPT | Performed by: SURGERY

## 2017-01-01 PROCEDURE — 25000128 H RX IP 250 OP 636: Performed by: EMERGENCY MEDICINE

## 2017-01-01 PROCEDURE — 87205 SMEAR GRAM STAIN: CPT | Performed by: ANESTHESIOLOGY

## 2017-01-01 PROCEDURE — 96365 THER/PROPH/DIAG IV INF INIT: CPT

## 2017-01-01 PROCEDURE — 82805 BLOOD GASES W/O2 SATURATION: CPT | Performed by: INTERNAL MEDICINE

## 2017-01-01 PROCEDURE — 83516 IMMUNOASSAY NONANTIBODY: CPT | Performed by: INTERNAL MEDICINE

## 2017-01-01 PROCEDURE — T1013 SIGN LANG/ORAL INTERPRETER: HCPCS | Mod: U3 | Performed by: INTERNAL MEDICINE

## 2017-01-01 PROCEDURE — 83880 ASSAY OF NATRIURETIC PEPTIDE: CPT | Performed by: EMERGENCY MEDICINE

## 2017-01-01 PROCEDURE — 92610 EVALUATE SWALLOWING FUNCTION: CPT | Mod: GN

## 2017-01-01 PROCEDURE — 36415 COLL VENOUS BLD VENIPUNCTURE: CPT | Performed by: NURSE PRACTITIONER

## 2017-01-01 PROCEDURE — 87116 MYCOBACTERIA CULTURE: CPT | Performed by: INTERNAL MEDICINE

## 2017-01-01 PROCEDURE — 76705 ECHO EXAM OF ABDOMEN: CPT

## 2017-01-01 PROCEDURE — 83540 ASSAY OF IRON: CPT | Performed by: INTERNAL MEDICINE

## 2017-01-01 PROCEDURE — 86850 RBC ANTIBODY SCREEN: CPT | Performed by: ANESTHESIOLOGY

## 2017-01-01 PROCEDURE — 85004 AUTOMATED DIFF WBC COUNT: CPT | Performed by: INTERNAL MEDICINE

## 2017-01-01 PROCEDURE — 83605 ASSAY OF LACTIC ACID: CPT | Performed by: ANESTHESIOLOGY

## 2017-01-01 PROCEDURE — 94002 VENT MGMT INPAT INIT DAY: CPT

## 2017-01-01 PROCEDURE — 93321 DOPPLER ECHO F-UP/LMTD STD: CPT | Mod: 26 | Performed by: INTERNAL MEDICINE

## 2017-01-01 PROCEDURE — 93010 ELECTROCARDIOGRAM REPORT: CPT | Mod: 76 | Performed by: INTERNAL MEDICINE

## 2017-01-01 PROCEDURE — 71020 XR CHEST 2 VW: CPT

## 2017-01-01 PROCEDURE — 99000 SPECIMEN HANDLING OFFICE-LAB: CPT | Performed by: INTERNAL MEDICINE

## 2017-01-01 PROCEDURE — 85025 COMPLETE CBC W/AUTO DIFF WBC: CPT | Performed by: EMERGENCY MEDICINE

## 2017-01-01 PROCEDURE — 93005 ELECTROCARDIOGRAM TRACING: CPT

## 2017-01-01 PROCEDURE — 87210 SMEAR WET MOUNT SALINE/INK: CPT | Performed by: INTERNAL MEDICINE

## 2017-01-01 PROCEDURE — 85730 THROMBOPLASTIN TIME PARTIAL: CPT | Performed by: INTERNAL MEDICINE

## 2017-01-01 PROCEDURE — 94660 CPAP INITIATION&MGMT: CPT

## 2017-01-01 PROCEDURE — 70450 CT HEAD/BRAIN W/O DYE: CPT

## 2017-01-01 PROCEDURE — 25000131 ZZH RX MED GY IP 250 OP 636 PS 637: Mod: GY | Performed by: INTERNAL MEDICINE

## 2017-01-01 PROCEDURE — 83880 ASSAY OF NATRIURETIC PEPTIDE: CPT | Performed by: INTERNAL MEDICINE

## 2017-01-01 PROCEDURE — 27210995 ZZH RX 272: Performed by: INTERNAL MEDICINE

## 2017-01-01 PROCEDURE — 82805 BLOOD GASES W/O2 SATURATION: CPT | Performed by: PSYCHIATRY & NEUROLOGY

## 2017-01-01 PROCEDURE — 93306 TTE W/DOPPLER COMPLETE: CPT

## 2017-01-01 PROCEDURE — 36600 WITHDRAWAL OF ARTERIAL BLOOD: CPT

## 2017-01-01 PROCEDURE — 36415 COLL VENOUS BLD VENIPUNCTURE: CPT | Performed by: EMERGENCY MEDICINE

## 2017-01-01 PROCEDURE — 84484 ASSAY OF TROPONIN QUANT: CPT | Performed by: PSYCHIATRY & NEUROLOGY

## 2017-01-01 PROCEDURE — 93306 TTE W/DOPPLER COMPLETE: CPT | Mod: 26 | Performed by: INTERNAL MEDICINE

## 2017-01-01 PROCEDURE — 87040 BLOOD CULTURE FOR BACTERIA: CPT | Performed by: EMERGENCY MEDICINE

## 2017-01-01 PROCEDURE — 83735 ASSAY OF MAGNESIUM: CPT | Performed by: INTERNAL MEDICINE

## 2017-01-01 PROCEDURE — 80048 BASIC METABOLIC PNL TOTAL CA: CPT | Performed by: EMERGENCY MEDICINE

## 2017-01-01 PROCEDURE — 86612 BLASTOMYCES ANTIBODY: CPT | Performed by: INTERNAL MEDICINE

## 2017-01-01 PROCEDURE — 99292 CRITICAL CARE ADDL 30 MIN: CPT | Performed by: ANESTHESIOLOGY

## 2017-01-01 PROCEDURE — 80048 BASIC METABOLIC PNL TOTAL CA: CPT | Performed by: PSYCHIATRY & NEUROLOGY

## 2017-01-01 PROCEDURE — 82308 ASSAY OF CALCITONIN: CPT | Performed by: INTERNAL MEDICINE

## 2017-01-01 PROCEDURE — 40000983 ZZH STATISTIC HFNC ADULT NON-CPAP

## 2017-01-01 PROCEDURE — 85384 FIBRINOGEN ACTIVITY: CPT | Performed by: INTERNAL MEDICINE

## 2017-01-01 PROCEDURE — 87116 MYCOBACTERIA CULTURE: CPT | Mod: 90 | Performed by: INTERNAL MEDICINE

## 2017-01-01 PROCEDURE — 40000965 ZZH STATISTIC END TITIAL CO2 MONITORING

## 2017-01-01 PROCEDURE — P9016 RBC LEUKOCYTES REDUCED: HCPCS | Performed by: INTERNAL MEDICINE

## 2017-01-01 PROCEDURE — 88108 CYTOPATH CONCENTRATE TECH: CPT | Performed by: INTERNAL MEDICINE

## 2017-01-01 PROCEDURE — P9041 ALBUMIN (HUMAN),5%, 50ML: HCPCS | Performed by: ANESTHESIOLOGY

## 2017-01-01 PROCEDURE — 80076 HEPATIC FUNCTION PANEL: CPT | Performed by: INTERNAL MEDICINE

## 2017-01-01 PROCEDURE — 71250 CT THORAX DX C-: CPT

## 2017-01-01 PROCEDURE — 85018 HEMOGLOBIN: CPT | Performed by: INTERNAL MEDICINE

## 2017-01-01 PROCEDURE — 86901 BLOOD TYPING SEROLOGIC RH(D): CPT | Performed by: INTERNAL MEDICINE

## 2017-01-01 PROCEDURE — 87040 BLOOD CULTURE FOR BACTERIA: CPT | Performed by: ANESTHESIOLOGY

## 2017-01-01 PROCEDURE — 99223 1ST HOSP IP/OBS HIGH 75: CPT | Mod: AI | Performed by: INTERNAL MEDICINE

## 2017-01-01 PROCEDURE — 96375 TX/PRO/DX INJ NEW DRUG ADDON: CPT

## 2017-01-01 PROCEDURE — 85652 RBC SED RATE AUTOMATED: CPT | Performed by: INTERNAL MEDICINE

## 2017-01-01 PROCEDURE — 82805 BLOOD GASES W/O2 SATURATION: CPT | Performed by: EMERGENCY MEDICINE

## 2017-01-01 PROCEDURE — 87106 FUNGI IDENTIFICATION YEAST: CPT | Performed by: ANESTHESIOLOGY

## 2017-01-01 PROCEDURE — 85004 AUTOMATED DIFF WBC COUNT: CPT | Performed by: ANESTHESIOLOGY

## 2017-01-01 PROCEDURE — 83010 ASSAY OF HAPTOGLOBIN QUANT: CPT | Performed by: INTERNAL MEDICINE

## 2017-01-01 PROCEDURE — 82272 OCCULT BLD FECES 1-3 TESTS: CPT | Performed by: INTERNAL MEDICINE

## 2017-01-01 PROCEDURE — 40000809 ZZH STATISTIC NO DOCUMENTATION TO SUPPORT CHARGE

## 2017-01-01 PROCEDURE — 40000671 ZZH STATISTIC ANESTHESIA CASE

## 2017-01-01 PROCEDURE — 20000003 ZZH R&B ICU

## 2017-01-01 PROCEDURE — 93010 ELECTROCARDIOGRAM REPORT: CPT | Performed by: INTERNAL MEDICINE

## 2017-01-01 PROCEDURE — 82247 BILIRUBIN TOTAL: CPT | Performed by: INTERNAL MEDICINE

## 2017-01-01 PROCEDURE — 96367 TX/PROPH/DG ADDL SEQ IV INF: CPT

## 2017-01-01 PROCEDURE — 27210300 ZZH CANNULA HIGH FLOW, ADULT

## 2017-01-01 PROCEDURE — 88108 CYTOPATH CONCENTRATE TECH: CPT | Mod: 26 | Performed by: INTERNAL MEDICINE

## 2017-01-01 PROCEDURE — 83036 HEMOGLOBIN GLYCOSYLATED A1C: CPT | Performed by: INTERNAL MEDICINE

## 2017-01-01 PROCEDURE — 99291 CRITICAL CARE FIRST HOUR: CPT | Performed by: INTERNAL MEDICINE

## 2017-01-01 PROCEDURE — 40000104 ZZH STATISTIC MODERATE SEDATION < 10 MIN: Performed by: INTERNAL MEDICINE

## 2017-01-01 PROCEDURE — 88312 SPECIAL STAINS GROUP 1: CPT | Mod: 26 | Performed by: INTERNAL MEDICINE

## 2017-01-01 PROCEDURE — 86038 ANTINUCLEAR ANTIBODIES: CPT | Performed by: INTERNAL MEDICINE

## 2017-01-01 PROCEDURE — 86901 BLOOD TYPING SEROLOGIC RH(D): CPT | Performed by: ANESTHESIOLOGY

## 2017-01-01 PROCEDURE — 93308 TTE F-UP OR LMTD: CPT | Mod: 26 | Performed by: INTERNAL MEDICINE

## 2017-01-01 PROCEDURE — 86900 BLOOD TYPING SEROLOGIC ABO: CPT | Performed by: INTERNAL MEDICINE

## 2017-01-01 PROCEDURE — 87449 NOS EACH ORGANISM AG IA: CPT | Performed by: INTERNAL MEDICINE

## 2017-01-01 PROCEDURE — 25000125 ZZHC RX 250: Performed by: EMERGENCY MEDICINE

## 2017-01-01 PROCEDURE — 5A1945Z RESPIRATORY VENTILATION, 24-96 CONSECUTIVE HOURS: ICD-10-PCS | Performed by: INTERNAL MEDICINE

## 2017-01-01 PROCEDURE — 83615 LACTATE (LD) (LDH) ENZYME: CPT | Performed by: INTERNAL MEDICINE

## 2017-01-01 PROCEDURE — 31500 INSERT EMERGENCY AIRWAY: CPT

## 2017-01-01 PROCEDURE — P9047 ALBUMIN (HUMAN), 25%, 50ML: HCPCS | Performed by: INTERNAL MEDICINE

## 2017-01-01 PROCEDURE — 85379 FIBRIN DEGRADATION QUANT: CPT | Performed by: EMERGENCY MEDICINE

## 2017-01-01 PROCEDURE — 86431 RHEUMATOID FACTOR QUANT: CPT | Performed by: INTERNAL MEDICINE

## 2017-01-01 PROCEDURE — 86480 TB TEST CELL IMMUN MEASURE: CPT | Performed by: INTERNAL MEDICINE

## 2017-01-01 PROCEDURE — 94645 CONT INHLJ TX EACH ADDL HOUR: CPT

## 2017-01-01 PROCEDURE — 99291 CRITICAL CARE FIRST HOUR: CPT | Mod: 25 | Performed by: INTERNAL MEDICINE

## 2017-01-01 PROCEDURE — 83550 IRON BINDING TEST: CPT | Performed by: INTERNAL MEDICINE

## 2017-01-01 PROCEDURE — 82728 ASSAY OF FERRITIN: CPT | Performed by: INTERNAL MEDICINE

## 2017-01-01 PROCEDURE — 86225 DNA ANTIBODY NATIVE: CPT | Performed by: INTERNAL MEDICINE

## 2017-01-01 PROCEDURE — 87070 CULTURE OTHR SPECIMN AEROBIC: CPT | Performed by: ANESTHESIOLOGY

## 2017-01-01 PROCEDURE — 40000264 ECHO LIMITED WITH LUMASON

## 2017-01-01 PROCEDURE — 87305 ASPERGILLUS AG IA: CPT | Performed by: INTERNAL MEDICINE

## 2017-01-01 PROCEDURE — 93308 TTE F-UP OR LMTD: CPT

## 2017-01-01 PROCEDURE — 83605 ASSAY OF LACTIC ACID: CPT | Performed by: EMERGENCY MEDICINE

## 2017-01-01 PROCEDURE — 84484 ASSAY OF TROPONIN QUANT: CPT | Performed by: EMERGENCY MEDICINE

## 2017-01-01 PROCEDURE — 74176 CT ABD & PELVIS W/O CONTRAST: CPT

## 2017-01-01 PROCEDURE — 71010 XR CHEST PORT 1 VW: CPT

## 2017-01-01 PROCEDURE — 25000128 H RX IP 250 OP 636

## 2017-01-01 PROCEDURE — 85027 COMPLETE CBC AUTOMATED: CPT | Performed by: ANESTHESIOLOGY

## 2017-01-01 PROCEDURE — 88312 SPECIAL STAINS GROUP 1: CPT | Performed by: INTERNAL MEDICINE

## 2017-01-01 PROCEDURE — 85060 BLOOD SMEAR INTERPRETATION: CPT | Performed by: ANESTHESIOLOGY

## 2017-01-01 PROCEDURE — 93325 DOPPLER ECHO COLOR FLOW MAPG: CPT | Mod: 26 | Performed by: INTERNAL MEDICINE

## 2017-01-01 PROCEDURE — 86140 C-REACTIVE PROTEIN: CPT | Performed by: INTERNAL MEDICINE

## 2017-01-01 PROCEDURE — 84484 ASSAY OF TROPONIN QUANT: CPT | Performed by: ANESTHESIOLOGY

## 2017-01-01 PROCEDURE — 27211040 ZZH CONTINUOUS NEBULIZER MICRO PUMP

## 2017-01-01 PROCEDURE — 86850 RBC ANTIBODY SCREEN: CPT | Performed by: INTERNAL MEDICINE

## 2017-01-01 PROCEDURE — 83605 ASSAY OF LACTIC ACID: CPT | Performed by: PSYCHIATRY & NEUROLOGY

## 2017-01-01 PROCEDURE — 80053 COMPREHEN METABOLIC PANEL: CPT | Performed by: ANESTHESIOLOGY

## 2017-01-01 PROCEDURE — 80048 BASIC METABOLIC PNL TOTAL CA: CPT | Performed by: ANESTHESIOLOGY

## 2017-01-01 PROCEDURE — 87206 SMEAR FLUORESCENT/ACID STAI: CPT | Mod: 90 | Performed by: INTERNAL MEDICINE

## 2017-01-01 RX ORDER — AMLODIPINE BESYLATE 10 MG/1
10 TABLET ORAL AT BEDTIME
Qty: 90 TABLET | Refills: 1 | Status: SHIPPED | OUTPATIENT
Start: 2017-01-01

## 2017-01-01 RX ORDER — METHYLPREDNISOLONE SODIUM SUCCINATE 125 MG/2ML
60 INJECTION, POWDER, LYOPHILIZED, FOR SOLUTION INTRAMUSCULAR; INTRAVENOUS EVERY 8 HOURS
Status: DISCONTINUED | OUTPATIENT
Start: 2017-01-01 | End: 2017-01-01

## 2017-01-01 RX ORDER — METOPROLOL TARTRATE 100 MG
100 TABLET ORAL 2 TIMES DAILY
Status: DISCONTINUED | OUTPATIENT
Start: 2017-01-01 | End: 2017-01-01

## 2017-01-01 RX ORDER — FENTANYL CITRATE 50 UG/ML
25-50 INJECTION, SOLUTION INTRAMUSCULAR; INTRAVENOUS
Status: DISPENSED | OUTPATIENT
Start: 2017-01-01 | End: 2017-01-01

## 2017-01-01 RX ORDER — PROPOFOL 10 MG/ML
5-75 INJECTION, EMULSION INTRAVENOUS CONTINUOUS
Status: DISCONTINUED | OUTPATIENT
Start: 2017-01-01 | End: 2017-01-01

## 2017-01-01 RX ORDER — ALBUMIN, HUMAN INJ 5% 5 %
250 SOLUTION INTRAVENOUS
Status: CANCELLED | OUTPATIENT
Start: 2017-01-01

## 2017-01-01 RX ORDER — OXYMETAZOLINE HYDROCHLORIDE 0.05 G/100ML
2 SPRAY NASAL 2 TIMES DAILY PRN
Status: DISCONTINUED | OUTPATIENT
Start: 2017-01-01 | End: 2017-01-01 | Stop reason: HOSPADM

## 2017-01-01 RX ORDER — BUMETANIDE 0.5 MG/1
1 TABLET ORAL DAILY
Status: DISCONTINUED | OUTPATIENT
Start: 2017-01-01 | End: 2017-01-01

## 2017-01-01 RX ORDER — ALBUTEROL SULFATE 0.83 MG/ML
2.5 SOLUTION RESPIRATORY (INHALATION)
Status: DISCONTINUED | OUTPATIENT
Start: 2017-01-01 | End: 2017-01-01 | Stop reason: HOSPADM

## 2017-01-01 RX ORDER — IPRATROPIUM BROMIDE AND ALBUTEROL SULFATE 2.5; .5 MG/3ML; MG/3ML
SOLUTION RESPIRATORY (INHALATION)
Status: COMPLETED
Start: 2017-01-01 | End: 2017-01-01

## 2017-01-01 RX ORDER — CALCITRIOL 0.25 UG/1
0.25 CAPSULE, LIQUID FILLED ORAL DAILY
COMMUNITY

## 2017-01-01 RX ORDER — BUMETANIDE 0.25 MG/ML
1 INJECTION INTRAMUSCULAR; INTRAVENOUS DAILY
Status: DISCONTINUED | OUTPATIENT
Start: 2017-01-01 | End: 2017-01-01

## 2017-01-01 RX ORDER — BENZONATATE 100 MG/1
100 CAPSULE ORAL 3 TIMES DAILY PRN
Status: DISCONTINUED | OUTPATIENT
Start: 2017-01-01 | End: 2017-01-01 | Stop reason: HOSPADM

## 2017-01-01 RX ORDER — NICOTINE POLACRILEX 4 MG
15-30 LOZENGE BUCCAL
Status: DISCONTINUED | OUTPATIENT
Start: 2017-01-01 | End: 2017-01-01 | Stop reason: HOSPADM

## 2017-01-01 RX ORDER — HYDROMORPHONE HYDROCHLORIDE 1 MG/ML
.3-.5 INJECTION, SOLUTION INTRAMUSCULAR; INTRAVENOUS; SUBCUTANEOUS
Status: DISCONTINUED | OUTPATIENT
Start: 2017-01-01 | End: 2017-01-01 | Stop reason: HOSPADM

## 2017-01-01 RX ORDER — CALCITRIOL 0.25 UG/1
0.25 CAPSULE, LIQUID FILLED ORAL DAILY
Status: DISCONTINUED | OUTPATIENT
Start: 2017-01-01 | End: 2017-01-01

## 2017-01-01 RX ORDER — IPRATROPIUM BROMIDE AND ALBUTEROL SULFATE 2.5; .5 MG/3ML; MG/3ML
3 SOLUTION RESPIRATORY (INHALATION)
Status: DISCONTINUED | OUTPATIENT
Start: 2017-01-01 | End: 2017-01-01

## 2017-01-01 RX ORDER — METHYLPREDNISOLONE SODIUM SUCCINATE 125 MG/2ML
60 INJECTION, POWDER, LYOPHILIZED, FOR SOLUTION INTRAMUSCULAR; INTRAVENOUS EVERY 6 HOURS
Status: DISCONTINUED | OUTPATIENT
Start: 2017-01-01 | End: 2017-01-01

## 2017-01-01 RX ORDER — ACETAMINOPHEN 325 MG/1
975 TABLET ORAL EVERY 8 HOURS
Status: DISCONTINUED | OUTPATIENT
Start: 2017-01-01 | End: 2017-01-01

## 2017-01-01 RX ORDER — ALBUMIN, HUMAN INJ 5% 5 %
SOLUTION INTRAVENOUS
Status: COMPLETED
Start: 2017-01-01 | End: 2017-01-01

## 2017-01-01 RX ORDER — ALBUMIN, HUMAN INJ 5% 5 %
25 SOLUTION INTRAVENOUS ONCE
Status: COMPLETED | OUTPATIENT
Start: 2017-01-01 | End: 2017-01-01

## 2017-01-01 RX ORDER — ASPIRIN 325 MG
325 TABLET ORAL DAILY
Status: DISCONTINUED | OUTPATIENT
Start: 2017-01-01 | End: 2017-01-01

## 2017-01-01 RX ORDER — NALOXONE HYDROCHLORIDE 0.4 MG/ML
.1-.4 INJECTION, SOLUTION INTRAMUSCULAR; INTRAVENOUS; SUBCUTANEOUS
Status: DISCONTINUED | OUTPATIENT
Start: 2017-01-01 | End: 2017-01-01 | Stop reason: HOSPADM

## 2017-01-01 RX ORDER — NICOTINE POLACRILEX 4 MG
15-30 LOZENGE BUCCAL
Status: DISCONTINUED | OUTPATIENT
Start: 2017-01-01 | End: 2017-01-01

## 2017-01-01 RX ORDER — CEFTRIAXONE 1 G/1
1 INJECTION, POWDER, FOR SOLUTION INTRAMUSCULAR; INTRAVENOUS EVERY 24 HOURS
Status: DISCONTINUED | OUTPATIENT
Start: 2017-01-01 | End: 2017-01-01

## 2017-01-01 RX ORDER — METHYLPREDNISOLONE SODIUM SUCCINATE 125 MG/2ML
125 INJECTION, POWDER, LYOPHILIZED, FOR SOLUTION INTRAMUSCULAR; INTRAVENOUS ONCE
Status: COMPLETED | OUTPATIENT
Start: 2017-01-01 | End: 2017-01-01

## 2017-01-01 RX ORDER — IPRATROPIUM BROMIDE AND ALBUTEROL SULFATE 2.5; .5 MG/3ML; MG/3ML
3 SOLUTION RESPIRATORY (INHALATION) ONCE
Status: COMPLETED | OUTPATIENT
Start: 2017-01-01 | End: 2017-01-01

## 2017-01-01 RX ORDER — ASPIRIN 81 MG/1
81 TABLET ORAL DAILY
Status: DISCONTINUED | OUTPATIENT
Start: 2017-01-01 | End: 2017-01-01

## 2017-01-01 RX ORDER — LOSARTAN POTASSIUM 100 MG/1
50 TABLET ORAL DAILY
COMMUNITY

## 2017-01-01 RX ORDER — SODIUM CHLORIDE/ALOE VERA
GEL (GRAM) NASAL 4 TIMES DAILY PRN
Status: DISCONTINUED | OUTPATIENT
Start: 2017-01-01 | End: 2017-01-01 | Stop reason: HOSPADM

## 2017-01-01 RX ORDER — AZITHROMYCIN 250 MG/1
500 TABLET, FILM COATED ORAL DAILY
Status: DISCONTINUED | OUTPATIENT
Start: 2017-01-01 | End: 2017-01-01

## 2017-01-01 RX ORDER — CALCIUM CHLORIDE 100 MG/ML
1 INJECTION INTRAVENOUS; INTRAVENTRICULAR ONCE
Status: DISCONTINUED | OUTPATIENT
Start: 2017-01-01 | End: 2017-01-01

## 2017-01-01 RX ORDER — CODEINE PHOSPHATE AND GUAIFENESIN 10; 100 MG/5ML; MG/5ML
5 SOLUTION ORAL EVERY 4 HOURS PRN
Status: DISCONTINUED | OUTPATIENT
Start: 2017-01-01 | End: 2017-01-01 | Stop reason: HOSPADM

## 2017-01-01 RX ORDER — DEXTROSE MONOHYDRATE 25 G/50ML
25-50 INJECTION, SOLUTION INTRAVENOUS
Status: DISCONTINUED | OUTPATIENT
Start: 2017-01-01 | End: 2017-01-01 | Stop reason: HOSPADM

## 2017-01-01 RX ORDER — FENTANYL CITRATE 50 UG/ML
25 INJECTION, SOLUTION INTRAMUSCULAR; INTRAVENOUS EVERY 5 MIN PRN
Status: DISPENSED | OUTPATIENT
Start: 2017-01-01 | End: 2017-01-01

## 2017-01-01 RX ORDER — ALBUMIN (HUMAN) 12.5 G/50ML
25 SOLUTION INTRAVENOUS ONCE
Status: COMPLETED | OUTPATIENT
Start: 2017-01-01 | End: 2017-01-01

## 2017-01-01 RX ORDER — SODIUM CHLORIDE 9 MG/ML
INJECTION, SOLUTION INTRAVENOUS CONTINUOUS
Status: DISCONTINUED | OUTPATIENT
Start: 2017-01-01 | End: 2017-01-01 | Stop reason: HOSPADM

## 2017-01-01 RX ORDER — PROPOFOL 10 MG/ML
INJECTION, EMULSION INTRAVENOUS
Status: DISCONTINUED
Start: 2017-01-01 | End: 2017-01-01 | Stop reason: HOSPADM

## 2017-01-01 RX ORDER — BUMETANIDE 0.25 MG/ML
2 INJECTION INTRAMUSCULAR; INTRAVENOUS ONCE
Status: COMPLETED | OUTPATIENT
Start: 2017-01-01 | End: 2017-01-01

## 2017-01-01 RX ORDER — MAGNESIUM HYDROXIDE 1200 MG/15ML
LIQUID ORAL PRN
Status: DISCONTINUED | OUTPATIENT
Start: 2017-01-01 | End: 2017-01-01 | Stop reason: HOSPADM

## 2017-01-01 RX ORDER — MORPHINE SULFATE 2 MG/ML
2 INJECTION, SOLUTION INTRAMUSCULAR; INTRAVENOUS ONCE
Status: COMPLETED | OUTPATIENT
Start: 2017-01-01 | End: 2017-01-01

## 2017-01-01 RX ORDER — BUMETANIDE 0.25 MG/ML
2 INJECTION INTRAMUSCULAR; INTRAVENOUS EVERY 12 HOURS
Status: DISCONTINUED | OUTPATIENT
Start: 2017-01-01 | End: 2017-01-01

## 2017-01-01 RX ORDER — PRAVASTATIN SODIUM 20 MG
80 TABLET ORAL AT BEDTIME
Status: DISCONTINUED | OUTPATIENT
Start: 2017-01-01 | End: 2017-01-01

## 2017-01-01 RX ORDER — PROPOFOL 10 MG/ML
10-20 INJECTION, EMULSION INTRAVENOUS EVERY 30 MIN PRN
Status: DISCONTINUED | OUTPATIENT
Start: 2017-01-01 | End: 2017-01-01 | Stop reason: HOSPADM

## 2017-01-01 RX ORDER — MULTIVIT-MIN/IRON/FOLIC ACID/K 18-600-40
1 CAPSULE ORAL DAILY
COMMUNITY

## 2017-01-01 RX ORDER — NALOXONE HYDROCHLORIDE 0.4 MG/ML
.1-.4 INJECTION, SOLUTION INTRAMUSCULAR; INTRAVENOUS; SUBCUTANEOUS
Status: DISCONTINUED | OUTPATIENT
Start: 2017-01-01 | End: 2017-01-01

## 2017-01-01 RX ORDER — CEFTRIAXONE 1 G/1
1 INJECTION, POWDER, FOR SOLUTION INTRAMUSCULAR; INTRAVENOUS ONCE
Status: COMPLETED | OUTPATIENT
Start: 2017-01-01 | End: 2017-01-01

## 2017-01-01 RX ORDER — MEROPENEM 500 MG/1
500 INJECTION, POWDER, FOR SOLUTION INTRAVENOUS EVERY 12 HOURS
Status: DISCONTINUED | OUTPATIENT
Start: 2017-01-01 | End: 2017-01-01

## 2017-01-01 RX ORDER — OXYCODONE HYDROCHLORIDE 5 MG/1
5 TABLET ORAL EVERY 4 HOURS PRN
Status: DISCONTINUED | OUTPATIENT
Start: 2017-01-01 | End: 2017-01-01 | Stop reason: HOSPADM

## 2017-01-01 RX ORDER — EPOPROSTENOL SODIUM 0.5 MG/1
INJECTION, POWDER, LYOPHILIZED, FOR SOLUTION INTRAVENOUS EVERY 8 HOURS
Status: DISCONTINUED | OUTPATIENT
Start: 2017-01-01 | End: 2017-01-01 | Stop reason: HOSPADM

## 2017-01-01 RX ORDER — FERROUS GLUCONATE 324(38)MG
325 TABLET ORAL EVERY OTHER DAY
Qty: 45 TABLET | Refills: 3 | Status: SHIPPED | OUTPATIENT
Start: 2017-01-01

## 2017-01-01 RX ORDER — PANTOPRAZOLE SODIUM 40 MG/1
40 TABLET, DELAYED RELEASE ORAL
Status: DISCONTINUED | OUTPATIENT
Start: 2017-01-01 | End: 2017-01-01

## 2017-01-01 RX ORDER — LORAZEPAM 2 MG/ML
0.5 INJECTION INTRAMUSCULAR
Status: DISCONTINUED | OUTPATIENT
Start: 2017-01-01 | End: 2017-01-01 | Stop reason: HOSPADM

## 2017-01-01 RX ORDER — HYDROMORPHONE HYDROCHLORIDE 1 MG/ML
.3-.5 INJECTION, SOLUTION INTRAMUSCULAR; INTRAVENOUS; SUBCUTANEOUS
Status: DISCONTINUED | OUTPATIENT
Start: 2017-01-01 | End: 2017-01-01

## 2017-01-01 RX ORDER — BUMETANIDE 0.25 MG/ML
1 INJECTION INTRAMUSCULAR; INTRAVENOUS EVERY 12 HOURS
Status: DISCONTINUED | OUTPATIENT
Start: 2017-01-01 | End: 2017-01-01

## 2017-01-01 RX ORDER — ACETAMINOPHEN 325 MG/1
650 TABLET ORAL EVERY 4 HOURS PRN
Status: DISCONTINUED | OUTPATIENT
Start: 2017-01-01 | End: 2017-01-01 | Stop reason: HOSPADM

## 2017-01-01 RX ORDER — ALBUMIN, HUMAN INJ 5% 5 %
25 SOLUTION INTRAVENOUS
Status: COMPLETED | OUTPATIENT
Start: 2017-01-01 | End: 2017-01-01

## 2017-01-01 RX ORDER — POLYETHYLENE GLYCOL 3350 17 G/17G
17 POWDER, FOR SOLUTION ORAL 2 TIMES DAILY
Status: DISCONTINUED | OUTPATIENT
Start: 2017-01-01 | End: 2017-01-01

## 2017-01-01 RX ORDER — DEXTROSE MONOHYDRATE 25 G/50ML
25-50 INJECTION, SOLUTION INTRAVENOUS
Status: DISCONTINUED | OUTPATIENT
Start: 2017-01-01 | End: 2017-01-01

## 2017-01-01 RX ORDER — NYSTATIN 100000/ML
500000 SUSPENSION, ORAL (FINAL DOSE FORM) ORAL 4 TIMES DAILY
Status: DISCONTINUED | OUTPATIENT
Start: 2017-01-01 | End: 2017-01-01

## 2017-01-01 RX ORDER — SODIUM CHLORIDE 9 MG/ML
INJECTION, SOLUTION INTRAVENOUS CONTINUOUS
Status: DISCONTINUED | OUTPATIENT
Start: 2017-01-01 | End: 2017-01-01

## 2017-01-01 RX ORDER — FUROSEMIDE 10 MG/ML
20 INJECTION INTRAMUSCULAR; INTRAVENOUS ONCE
Status: COMPLETED | OUTPATIENT
Start: 2017-01-01 | End: 2017-01-01

## 2017-01-01 RX ORDER — FENTANYL CITRATE 50 UG/ML
INJECTION, SOLUTION INTRAMUSCULAR; INTRAVENOUS
Status: DISCONTINUED
Start: 2017-01-01 | End: 2017-01-01 | Stop reason: HOSPADM

## 2017-01-01 RX ORDER — BUMETANIDE 1 MG/1
1 TABLET ORAL DAILY
COMMUNITY

## 2017-01-01 RX ORDER — METOPROLOL TARTRATE 1 MG/ML
10 INJECTION, SOLUTION INTRAVENOUS ONCE
Status: COMPLETED | OUTPATIENT
Start: 2017-01-01 | End: 2017-01-01

## 2017-01-01 RX ORDER — METOPROLOL TARTRATE 50 MG
50 TABLET ORAL 2 TIMES DAILY
Status: DISCONTINUED | OUTPATIENT
Start: 2017-01-01 | End: 2017-01-01

## 2017-01-01 RX ORDER — BISACODYL 10 MG
10 SUPPOSITORY, RECTAL RECTAL DAILY PRN
Status: DISCONTINUED | OUTPATIENT
Start: 2017-01-01 | End: 2017-01-01 | Stop reason: HOSPADM

## 2017-01-01 RX ORDER — LORAZEPAM 2 MG/ML
1 INJECTION INTRAMUSCULAR EVERY 4 HOURS PRN
Status: DISPENSED | OUTPATIENT
Start: 2017-01-01 | End: 2017-01-01

## 2017-01-01 RX ORDER — PANTOPRAZOLE SODIUM 40 MG/1
40 TABLET, DELAYED RELEASE ORAL EVERY MORNING
Status: DISCONTINUED | OUTPATIENT
Start: 2017-01-01 | End: 2017-01-01

## 2017-01-01 RX ORDER — ASPIRIN 300 MG/1
300 SUPPOSITORY RECTAL ONCE
Status: COMPLETED | OUTPATIENT
Start: 2017-01-01 | End: 2017-01-01

## 2017-01-01 RX ORDER — FENTANYL CITRATE 50 UG/ML
INJECTION, SOLUTION INTRAMUSCULAR; INTRAVENOUS
Status: COMPLETED
Start: 2017-01-01 | End: 2017-01-01

## 2017-01-01 RX ORDER — AMLODIPINE BESYLATE 10 MG/1
10 TABLET ORAL DAILY
Status: DISCONTINUED | OUTPATIENT
Start: 2017-01-01 | End: 2017-01-01

## 2017-01-01 RX ADMIN — POLYETHYLENE GLYCOL 3350 17 G: 17 POWDER, FOR SOLUTION ORAL at 18:24

## 2017-01-01 RX ADMIN — METHYLPREDNISOLONE SODIUM SUCCINATE 62.5 MG: 125 INJECTION, POWDER, FOR SOLUTION INTRAMUSCULAR; INTRAVENOUS at 23:43

## 2017-01-01 RX ADMIN — METHYLPREDNISOLONE SODIUM SUCCINATE 62.5 MG: 125 INJECTION, POWDER, FOR SOLUTION INTRAMUSCULAR; INTRAVENOUS at 12:50

## 2017-01-01 RX ADMIN — IPRATROPIUM BROMIDE AND ALBUTEROL SULFATE 3 ML: .5; 3 SOLUTION RESPIRATORY (INHALATION) at 19:45

## 2017-01-01 RX ADMIN — OXYCODONE HYDROCHLORIDE 5 MG: 5 TABLET ORAL at 00:30

## 2017-01-01 RX ADMIN — OXYCODONE HYDROCHLORIDE 5 MG: 5 TABLET ORAL at 05:21

## 2017-01-01 RX ADMIN — ASPIRIN 81 MG: 81 TABLET, COATED ORAL at 08:19

## 2017-01-01 RX ADMIN — FLUTICASONE FUROATE AND VILANTEROL TRIFENATATE 1 PUFF: 100; 25 POWDER RESPIRATORY (INHALATION) at 08:25

## 2017-01-01 RX ADMIN — ACETAMINOPHEN 650 MG: 325 TABLET, FILM COATED ORAL at 00:20

## 2017-01-01 RX ADMIN — IPRATROPIUM BROMIDE AND ALBUTEROL SULFATE 3 ML: .5; 3 SOLUTION RESPIRATORY (INHALATION) at 11:33

## 2017-01-01 RX ADMIN — TAZOBACTAM SODIUM AND PIPERACILLIN SODIUM 2.25 G: 250; 2 INJECTION, SOLUTION INTRAVENOUS at 01:06

## 2017-01-01 RX ADMIN — TAZOBACTAM SODIUM AND PIPERACILLIN SODIUM 2.25 G: 250; 2 INJECTION, SOLUTION INTRAVENOUS at 05:33

## 2017-01-01 RX ADMIN — ACETAMINOPHEN 975 MG: 325 TABLET, FILM COATED ORAL at 21:58

## 2017-01-01 RX ADMIN — BUMETANIDE 1 MG: 0.25 INJECTION INTRAMUSCULAR; INTRAVENOUS at 09:53

## 2017-01-01 RX ADMIN — TAZOBACTAM SODIUM AND PIPERACILLIN SODIUM 3.38 G: 375; 3 INJECTION, SOLUTION INTRAVENOUS at 13:28

## 2017-01-01 RX ADMIN — INSULIN ASPART 1 UNITS: 100 INJECTION, SOLUTION INTRAVENOUS; SUBCUTANEOUS at 08:49

## 2017-01-01 RX ADMIN — ALBUTEROL SULFATE 2.5 MG: 2.5 SOLUTION RESPIRATORY (INHALATION) at 04:42

## 2017-01-01 RX ADMIN — INSULIN ASPART 1 UNITS: 100 INJECTION, SOLUTION INTRAVENOUS; SUBCUTANEOUS at 03:45

## 2017-01-01 RX ADMIN — CALCITRIOL 0.25 MCG: 0.25 CAPSULE, LIQUID FILLED ORAL at 08:49

## 2017-01-01 RX ADMIN — METOPROLOL TARTRATE 100 MG: 100 TABLET, FILM COATED ORAL at 21:12

## 2017-01-01 RX ADMIN — NYSTATIN 500000 UNITS: 100000 SUSPENSION ORAL at 22:51

## 2017-01-01 RX ADMIN — GUAIFENESIN AND CODEINE PHOSPHATE 5 ML: 100; 10 SOLUTION ORAL at 23:38

## 2017-01-01 RX ADMIN — TAZOBACTAM SODIUM AND PIPERACILLIN SODIUM 2.25 G: 250; 2 INJECTION, SOLUTION INTRAVENOUS at 08:49

## 2017-01-01 RX ADMIN — PRAVASTATIN SODIUM 80 MG: 20 TABLET ORAL at 21:12

## 2017-01-01 RX ADMIN — TAZOBACTAM SODIUM AND PIPERACILLIN SODIUM 2.25 G: 250; 2 INJECTION, SOLUTION INTRAVENOUS at 02:10

## 2017-01-01 RX ADMIN — IPRATROPIUM BROMIDE AND ALBUTEROL SULFATE 3 ML: .5; 3 SOLUTION RESPIRATORY (INHALATION) at 12:22

## 2017-01-01 RX ADMIN — IPRATROPIUM BROMIDE AND ALBUTEROL SULFATE 3 ML: .5; 3 SOLUTION RESPIRATORY (INHALATION) at 22:27

## 2017-01-01 RX ADMIN — METOPROLOL TARTRATE 100 MG: 100 TABLET, FILM COATED ORAL at 08:19

## 2017-01-01 RX ADMIN — SALINE NASAL SPRAY 1 SPRAY: 1.5 SOLUTION NASAL at 22:56

## 2017-01-01 RX ADMIN — MEROPENEM 500 MG: 500 INJECTION, POWDER, FOR SOLUTION INTRAVENOUS at 13:35

## 2017-01-01 RX ADMIN — TAZOBACTAM SODIUM AND PIPERACILLIN SODIUM 2.25 G: 250; 2 INJECTION, SOLUTION INTRAVENOUS at 14:01

## 2017-01-01 RX ADMIN — AZITHROMYCIN MONOHYDRATE 500 MG: 500 INJECTION, POWDER, LYOPHILIZED, FOR SOLUTION INTRAVENOUS at 19:58

## 2017-01-01 RX ADMIN — METOPROLOL TARTRATE 100 MG: 100 TABLET, FILM COATED ORAL at 21:10

## 2017-01-01 RX ADMIN — VASOPRESSIN 2.4 UNITS/HR: 20 INJECTION INTRAVENOUS at 19:06

## 2017-01-01 RX ADMIN — TAZOBACTAM SODIUM AND PIPERACILLIN SODIUM 2.25 G: 250; 2 INJECTION, SOLUTION INTRAVENOUS at 14:40

## 2017-01-01 RX ADMIN — INSULIN ASPART 1 UNITS: 100 INJECTION, SOLUTION INTRAVENOUS; SUBCUTANEOUS at 07:34

## 2017-01-01 RX ADMIN — TAZOBACTAM SODIUM AND PIPERACILLIN SODIUM 2.25 G: 250; 2 INJECTION, SOLUTION INTRAVENOUS at 22:18

## 2017-01-01 RX ADMIN — CALCITRIOL 0.25 MCG: 0.25 CAPSULE, LIQUID FILLED ORAL at 20:43

## 2017-01-01 RX ADMIN — PRAVASTATIN SODIUM 80 MG: 20 TABLET ORAL at 21:57

## 2017-01-01 RX ADMIN — IPRATROPIUM BROMIDE AND ALBUTEROL SULFATE 3 ML: .5; 3 SOLUTION RESPIRATORY (INHALATION) at 08:20

## 2017-01-01 RX ADMIN — TAZOBACTAM SODIUM AND PIPERACILLIN SODIUM 2.25 G: 250; 2 INJECTION, SOLUTION INTRAVENOUS at 19:53

## 2017-01-01 RX ADMIN — INSULIN ASPART 2 UNITS: 100 INJECTION, SOLUTION INTRAVENOUS; SUBCUTANEOUS at 20:00

## 2017-01-01 RX ADMIN — PANTOPRAZOLE SODIUM 40 MG: 40 TABLET, DELAYED RELEASE ORAL at 15:42

## 2017-01-01 RX ADMIN — IPRATROPIUM BROMIDE AND ALBUTEROL SULFATE 3 ML: .5; 3 SOLUTION RESPIRATORY (INHALATION) at 11:21

## 2017-01-01 RX ADMIN — CEFTRIAXONE 1 G: 1 INJECTION, POWDER, FOR SOLUTION INTRAMUSCULAR; INTRAVENOUS at 21:27

## 2017-01-01 RX ADMIN — IPRATROPIUM BROMIDE AND ALBUTEROL SULFATE 3 ML: .5; 3 SOLUTION RESPIRATORY (INHALATION) at 08:28

## 2017-01-01 RX ADMIN — SULFUR HEXAFLUORIDE 1 ML: KIT at 08:30

## 2017-01-01 RX ADMIN — TAZOBACTAM SODIUM AND PIPERACILLIN SODIUM 2.25 G: 250; 2 INJECTION, SOLUTION INTRAVENOUS at 21:10

## 2017-01-01 RX ADMIN — IPRATROPIUM BROMIDE AND ALBUTEROL SULFATE 3 ML: .5; 3 SOLUTION RESPIRATORY (INHALATION) at 23:05

## 2017-01-01 RX ADMIN — ACETAMINOPHEN 975 MG: 325 TABLET, FILM COATED ORAL at 06:14

## 2017-01-01 RX ADMIN — GUAIFENESIN AND CODEINE PHOSPHATE 5 ML: 100; 10 SOLUTION ORAL at 14:27

## 2017-01-01 RX ADMIN — IPRATROPIUM BROMIDE AND ALBUTEROL SULFATE 3 ML: .5; 3 SOLUTION RESPIRATORY (INHALATION) at 19:27

## 2017-01-01 RX ADMIN — ACETAMINOPHEN 975 MG: 325 TABLET, FILM COATED ORAL at 21:56

## 2017-01-01 RX ADMIN — VITAMIN D, TAB 1000IU (100/BT) 1000 UNITS: 25 TAB at 08:12

## 2017-01-01 RX ADMIN — METOPROLOL TARTRATE 50 MG: 50 TABLET, FILM COATED ORAL at 19:49

## 2017-01-01 RX ADMIN — FLUTICASONE FUROATE AND VILANTEROL TRIFENATATE 1 PUFF: 100; 25 POWDER RESPIRATORY (INHALATION) at 08:06

## 2017-01-01 RX ADMIN — GUAIFENESIN AND CODEINE PHOSPHATE 5 ML: 100; 10 SOLUTION ORAL at 10:37

## 2017-01-01 RX ADMIN — AZITHROMYCIN MONOHYDRATE 500 MG: 500 INJECTION, POWDER, LYOPHILIZED, FOR SOLUTION INTRAVENOUS at 23:01

## 2017-01-01 RX ADMIN — BUMETANIDE 1 MG: 0.5 TABLET ORAL at 12:45

## 2017-01-01 RX ADMIN — INSULIN ASPART 1 UNITS: 100 INJECTION, SOLUTION INTRAVENOUS; SUBCUTANEOUS at 12:01

## 2017-01-01 RX ADMIN — METOPROLOL TARTRATE 100 MG: 100 TABLET, FILM COATED ORAL at 07:38

## 2017-01-01 RX ADMIN — METHYLPREDNISOLONE SODIUM SUCCINATE 62.5 MG: 125 INJECTION, POWDER, FOR SOLUTION INTRAMUSCULAR; INTRAVENOUS at 06:08

## 2017-01-01 RX ADMIN — ACETAMINOPHEN 975 MG: 325 TABLET, FILM COATED ORAL at 21:31

## 2017-01-01 RX ADMIN — TAZOBACTAM SODIUM AND PIPERACILLIN SODIUM 2.25 G: 250; 2 INJECTION, SOLUTION INTRAVENOUS at 08:20

## 2017-01-01 RX ADMIN — ALBUMIN (HUMAN) 25 G: 12.5 SOLUTION INTRAVENOUS at 00:28

## 2017-01-01 RX ADMIN — FLUTICASONE FUROATE AND VILANTEROL TRIFENATATE 1 PUFF: 100; 25 POWDER RESPIRATORY (INHALATION) at 07:34

## 2017-01-01 RX ADMIN — ASPIRIN 81 MG: 81 TABLET, COATED ORAL at 08:40

## 2017-01-01 RX ADMIN — METHYLPREDNISOLONE SODIUM SUCCINATE 62.5 MG: 125 INJECTION, POWDER, FOR SOLUTION INTRAMUSCULAR; INTRAVENOUS at 06:03

## 2017-01-01 RX ADMIN — IPRATROPIUM BROMIDE AND ALBUTEROL SULFATE 3 ML: .5; 3 SOLUTION RESPIRATORY (INHALATION) at 18:29

## 2017-01-01 RX ADMIN — TAZOBACTAM SODIUM AND PIPERACILLIN SODIUM 2.25 G: 250; 2 INJECTION, SOLUTION INTRAVENOUS at 21:20

## 2017-01-01 RX ADMIN — CALCITRIOL 0.25 MCG: 0.25 CAPSULE, LIQUID FILLED ORAL at 08:19

## 2017-01-01 RX ADMIN — AZITHROMYCIN 500 MG: 250 TABLET, FILM COATED ORAL at 08:50

## 2017-01-01 RX ADMIN — TAZOBACTAM SODIUM AND PIPERACILLIN SODIUM 2.25 G: 250; 2 INJECTION, SOLUTION INTRAVENOUS at 13:09

## 2017-01-01 RX ADMIN — WATER 20 NG/KG/MIN: 1 SOLUTION INTRAVENOUS at 12:22

## 2017-01-01 RX ADMIN — MIDAZOLAM 2 MG: 1 INJECTION INTRAMUSCULAR; INTRAVENOUS at 06:58

## 2017-01-01 RX ADMIN — IPRATROPIUM BROMIDE AND ALBUTEROL SULFATE 3 ML: .5; 3 SOLUTION RESPIRATORY (INHALATION) at 11:17

## 2017-01-01 RX ADMIN — NYSTATIN 500000 UNITS: 100000 SUSPENSION ORAL at 10:17

## 2017-01-01 RX ADMIN — SODIUM BICARBONATE: 84 INJECTION, SOLUTION INTRAVENOUS at 14:20

## 2017-01-01 RX ADMIN — GUAIFENESIN AND CODEINE PHOSPHATE 5 ML: 100; 10 SOLUTION ORAL at 19:49

## 2017-01-01 RX ADMIN — ALBUTEROL SULFATE 2.5 MG: 2.5 SOLUTION RESPIRATORY (INHALATION) at 17:10

## 2017-01-01 RX ADMIN — HUMAN INSULIN 1.5 UNITS/HR: 100 INJECTION, SOLUTION SUBCUTANEOUS at 06:50

## 2017-01-01 RX ADMIN — ACETAMINOPHEN 650 MG: 325 TABLET, FILM COATED ORAL at 18:46

## 2017-01-01 RX ADMIN — FLUTICASONE FUROATE AND VILANTEROL TRIFENATATE 1 PUFF: 100; 25 POWDER RESPIRATORY (INHALATION) at 07:51

## 2017-01-01 RX ADMIN — AZITHROMYCIN MONOHYDRATE 500 MG: 500 INJECTION, POWDER, LYOPHILIZED, FOR SOLUTION INTRAVENOUS at 14:35

## 2017-01-01 RX ADMIN — TAZOBACTAM SODIUM AND PIPERACILLIN SODIUM 2.25 G: 250; 2 INJECTION, SOLUTION INTRAVENOUS at 08:26

## 2017-01-01 RX ADMIN — INSULIN ASPART 2 UNITS: 100 INJECTION, SOLUTION INTRAVENOUS; SUBCUTANEOUS at 11:55

## 2017-01-01 RX ADMIN — IPRATROPIUM BROMIDE AND ALBUTEROL SULFATE 3 ML: .5; 3 SOLUTION RESPIRATORY (INHALATION) at 08:01

## 2017-01-01 RX ADMIN — OXYCODONE HYDROCHLORIDE 5 MG: 5 TABLET ORAL at 23:03

## 2017-01-01 RX ADMIN — IPRATROPIUM BROMIDE AND ALBUTEROL SULFATE 3 ML: .5; 3 SOLUTION RESPIRATORY (INHALATION) at 15:13

## 2017-01-01 RX ADMIN — ALBUTEROL SULFATE 2.5 MG: 2.5 SOLUTION RESPIRATORY (INHALATION) at 00:30

## 2017-01-01 RX ADMIN — FLUTICASONE FUROATE AND VILANTEROL TRIFENATATE 1 PUFF: 100; 25 POWDER RESPIRATORY (INHALATION) at 08:15

## 2017-01-01 RX ADMIN — BUMETANIDE 1 MG: 0.25 INJECTION INTRAMUSCULAR; INTRAVENOUS at 17:25

## 2017-01-01 RX ADMIN — MEROPENEM 500 MG: 500 INJECTION, POWDER, FOR SOLUTION INTRAVENOUS at 13:49

## 2017-01-01 RX ADMIN — EPOPROSTENOL SODIUM: 0.5 INJECTION, POWDER, LYOPHILIZED, FOR SOLUTION INTRAVENOUS at 10:56

## 2017-01-01 RX ADMIN — ASPIRIN 81 MG: 81 TABLET, COATED ORAL at 08:27

## 2017-01-01 RX ADMIN — PANTOPRAZOLE SODIUM 40 MG: 40 TABLET, DELAYED RELEASE ORAL at 16:46

## 2017-01-01 RX ADMIN — AZITHROMYCIN 500 MG: 250 TABLET, FILM COATED ORAL at 08:12

## 2017-01-01 RX ADMIN — ALBUTEROL SULFATE 2.5 MG: 2.5 SOLUTION RESPIRATORY (INHALATION) at 01:25

## 2017-01-01 RX ADMIN — ASPIRIN 81 MG: 81 TABLET, COATED ORAL at 08:49

## 2017-01-01 RX ADMIN — WATER 20 NG/KG/MIN: 1 SOLUTION INTRAVENOUS at 10:51

## 2017-01-01 RX ADMIN — METOPROLOL TARTRATE 100 MG: 100 TABLET, FILM COATED ORAL at 08:26

## 2017-01-01 RX ADMIN — IPRATROPIUM BROMIDE AND ALBUTEROL SULFATE 3 ML: .5; 3 SOLUTION RESPIRATORY (INHALATION) at 15:46

## 2017-01-01 RX ADMIN — PRAVASTATIN SODIUM 80 MG: 20 TABLET ORAL at 21:10

## 2017-01-01 RX ADMIN — TAZOBACTAM SODIUM AND PIPERACILLIN SODIUM 2.25 G: 250; 2 INJECTION, SOLUTION INTRAVENOUS at 02:00

## 2017-01-01 RX ADMIN — ACETAMINOPHEN 650 MG: 325 TABLET, FILM COATED ORAL at 17:48

## 2017-01-01 RX ADMIN — IPRATROPIUM BROMIDE AND ALBUTEROL SULFATE 3 ML: .5; 3 SOLUTION RESPIRATORY (INHALATION) at 08:38

## 2017-01-01 RX ADMIN — IPRATROPIUM BROMIDE AND ALBUTEROL SULFATE 3 ML: .5; 3 SOLUTION RESPIRATORY (INHALATION) at 15:01

## 2017-01-01 RX ADMIN — OXYCODONE HYDROCHLORIDE 5 MG: 5 TABLET ORAL at 10:36

## 2017-01-01 RX ADMIN — WATER 20 NG/KG/MIN: 1 SOLUTION INTRAVENOUS at 03:31

## 2017-01-01 RX ADMIN — FENTANYL CITRATE 25 MCG: 50 INJECTION, SOLUTION INTRAMUSCULAR; INTRAVENOUS at 12:07

## 2017-01-01 RX ADMIN — ACETAMINOPHEN 975 MG: 325 TABLET, FILM COATED ORAL at 13:59

## 2017-01-01 RX ADMIN — SODIUM BICARBONATE: 84 INJECTION, SOLUTION INTRAVENOUS at 16:33

## 2017-01-01 RX ADMIN — FENTANYL CITRATE 50 MCG/HR: 50 INJECTION, SOLUTION INTRAMUSCULAR; INTRAVENOUS at 01:39

## 2017-01-01 RX ADMIN — NYSTATIN 500000 UNITS: 100000 SUSPENSION ORAL at 13:38

## 2017-01-01 RX ADMIN — EPOPROSTENOL SODIUM: 0.5 INJECTION, POWDER, LYOPHILIZED, FOR SOLUTION INTRAVENOUS at 03:31

## 2017-01-01 RX ADMIN — SODIUM CHLORIDE: 9 INJECTION, SOLUTION INTRAVENOUS at 03:16

## 2017-01-01 RX ADMIN — TAZOBACTAM SODIUM AND PIPERACILLIN SODIUM 2.25 G: 250; 2 INJECTION, SOLUTION INTRAVENOUS at 05:27

## 2017-01-01 RX ADMIN — NYSTATIN 500000 UNITS: 100000 SUSPENSION ORAL at 17:45

## 2017-01-01 RX ADMIN — PANTOPRAZOLE SODIUM 40 MG: 40 TABLET, DELAYED RELEASE ORAL at 06:50

## 2017-01-01 RX ADMIN — POLYETHYLENE GLYCOL 3350 17 G: 17 POWDER, FOR SOLUTION ORAL at 21:26

## 2017-01-01 RX ADMIN — VITAMIN D, TAB 1000IU (100/BT) 1000 UNITS: 25 TAB at 08:49

## 2017-01-01 RX ADMIN — SALINE NASAL SPRAY 1 SPRAY: 1.5 SOLUTION NASAL at 09:12

## 2017-01-01 RX ADMIN — INSULIN ASPART 1 UNITS: 100 INJECTION, SOLUTION INTRAVENOUS; SUBCUTANEOUS at 16:28

## 2017-01-01 RX ADMIN — MIDAZOLAM 2 MG: 1 INJECTION INTRAMUSCULAR; INTRAVENOUS at 07:58

## 2017-01-01 RX ADMIN — INSULIN ASPART 1 UNITS: 100 INJECTION, SOLUTION INTRAVENOUS; SUBCUTANEOUS at 16:50

## 2017-01-01 RX ADMIN — PANTOPRAZOLE SODIUM 40 MG: 40 INJECTION, POWDER, FOR SOLUTION INTRAVENOUS at 13:49

## 2017-01-01 RX ADMIN — SODIUM BICARBONATE: 84 INJECTION, SOLUTION INTRAVENOUS at 21:36

## 2017-01-01 RX ADMIN — FENTANYL CITRATE 25 MCG: 50 INJECTION, SOLUTION INTRAMUSCULAR; INTRAVENOUS at 12:00

## 2017-01-01 RX ADMIN — PANTOPRAZOLE SODIUM 40 MG: 40 TABLET, DELAYED RELEASE ORAL at 08:21

## 2017-01-01 RX ADMIN — BUMETANIDE 2 MG: 0.25 INJECTION INTRAMUSCULAR; INTRAVENOUS at 00:52

## 2017-01-01 RX ADMIN — TAZOBACTAM SODIUM AND PIPERACILLIN SODIUM 2.25 G: 250; 2 INJECTION, SOLUTION INTRAVENOUS at 22:42

## 2017-01-01 RX ADMIN — AMLODIPINE BESYLATE 10 MG: 10 TABLET ORAL at 08:26

## 2017-01-01 RX ADMIN — PROPOFOL 10 MCG/KG/MIN: 10 INJECTION, EMULSION INTRAVENOUS at 06:05

## 2017-01-01 RX ADMIN — FLUTICASONE FUROATE AND VILANTEROL TRIFENATATE 1 PUFF: 100; 25 POWDER RESPIRATORY (INHALATION) at 08:29

## 2017-01-01 RX ADMIN — FUROSEMIDE 20 MG: 10 INJECTION, SOLUTION INTRAVENOUS at 02:06

## 2017-01-01 RX ADMIN — POLYETHYLENE GLYCOL 3350 17 G: 17 POWDER, FOR SOLUTION ORAL at 20:48

## 2017-01-01 RX ADMIN — METHYLPREDNISOLONE SODIUM SUCCINATE 62.5 MG: 125 INJECTION, POWDER, FOR SOLUTION INTRAMUSCULAR; INTRAVENOUS at 17:31

## 2017-01-01 RX ADMIN — ALBUMIN (HUMAN) 25 G: 12.5 SOLUTION INTRAVENOUS at 06:05

## 2017-01-01 RX ADMIN — LORAZEPAM 0.5 MG: 2 INJECTION INTRAMUSCULAR; INTRAVENOUS at 16:22

## 2017-01-01 RX ADMIN — POLYETHYLENE GLYCOL 3350 17 G: 17 POWDER, FOR SOLUTION ORAL at 08:27

## 2017-01-01 RX ADMIN — ACETAMINOPHEN 975 MG: 325 TABLET, FILM COATED ORAL at 05:30

## 2017-01-01 RX ADMIN — FLUTICASONE FUROATE AND VILANTEROL TRIFENATATE 1 PUFF: 100; 25 POWDER RESPIRATORY (INHALATION) at 07:23

## 2017-01-01 RX ADMIN — POLYETHYLENE GLYCOL 3350 17 G: 17 POWDER, FOR SOLUTION ORAL at 08:40

## 2017-01-01 RX ADMIN — TAZOBACTAM SODIUM AND PIPERACILLIN SODIUM 2.25 G: 250; 2 INJECTION, SOLUTION INTRAVENOUS at 14:28

## 2017-01-01 RX ADMIN — IPRATROPIUM BROMIDE AND ALBUTEROL SULFATE 3 ML: .5; 3 SOLUTION RESPIRATORY (INHALATION) at 15:30

## 2017-01-01 RX ADMIN — WATER 20 NG/KG/MIN: 1 SOLUTION INTRAVENOUS at 23:41

## 2017-01-01 RX ADMIN — VASOPRESSIN 2.4 UNITS/HR: 20 INJECTION INTRAVENOUS at 09:46

## 2017-01-01 RX ADMIN — INSULIN ASPART 1 UNITS: 100 INJECTION, SOLUTION INTRAVENOUS; SUBCUTANEOUS at 13:00

## 2017-01-01 RX ADMIN — METHYLPREDNISOLONE SODIUM SUCCINATE 62.5 MG: 125 INJECTION, POWDER, FOR SOLUTION INTRAMUSCULAR; INTRAVENOUS at 17:09

## 2017-01-01 RX ADMIN — PRAVASTATIN SODIUM 80 MG: 20 TABLET ORAL at 21:58

## 2017-01-01 RX ADMIN — INSULIN ASPART 2 UNITS: 100 INJECTION, SOLUTION INTRAVENOUS; SUBCUTANEOUS at 08:19

## 2017-01-01 RX ADMIN — HYDROMORPHONE HYDROCHLORIDE 0.5 MG: 1 INJECTION, SOLUTION INTRAMUSCULAR; INTRAVENOUS; SUBCUTANEOUS at 16:22

## 2017-01-01 RX ADMIN — AMLODIPINE BESYLATE 10 MG: 10 TABLET ORAL at 07:39

## 2017-01-01 RX ADMIN — PRAVASTATIN SODIUM 80 MG: 20 TABLET ORAL at 21:31

## 2017-01-01 RX ADMIN — FLUTICASONE FUROATE AND VILANTEROL TRIFENATATE 1 PUFF: 100; 25 POWDER RESPIRATORY (INHALATION) at 08:42

## 2017-01-01 RX ADMIN — METHYLPREDNISOLONE SODIUM SUCCINATE 62.5 MG: 125 INJECTION, POWDER, FOR SOLUTION INTRAMUSCULAR; INTRAVENOUS at 05:26

## 2017-01-01 RX ADMIN — NOREPINEPHRINE BITARTRATE 0.31 MCG/KG/MIN: 1 INJECTION INTRAVENOUS at 09:58

## 2017-01-01 RX ADMIN — VITAMIN D, TAB 1000IU (100/BT) 1000 UNITS: 25 TAB at 07:39

## 2017-01-01 RX ADMIN — PRAVASTATIN SODIUM 80 MG: 20 TABLET ORAL at 21:27

## 2017-01-01 RX ADMIN — MIDAZOLAM 1 MG: 1 INJECTION INTRAMUSCULAR; INTRAVENOUS at 05:22

## 2017-01-01 RX ADMIN — IPRATROPIUM BROMIDE AND ALBUTEROL SULFATE 3 ML: .5; 3 SOLUTION RESPIRATORY (INHALATION) at 07:34

## 2017-01-01 RX ADMIN — IPRATROPIUM BROMIDE AND ALBUTEROL SULFATE 3 ML: .5; 3 SOLUTION RESPIRATORY (INHALATION) at 19:06

## 2017-01-01 RX ADMIN — NOREPINEPHRINE BITARTRATE 0.31 MCG/KG/MIN: 1 INJECTION INTRAVENOUS at 23:09

## 2017-01-01 RX ADMIN — TAZOBACTAM SODIUM AND PIPERACILLIN SODIUM 2.25 G: 250; 2 INJECTION, SOLUTION INTRAVENOUS at 19:28

## 2017-01-01 RX ADMIN — TAZOBACTAM SODIUM AND PIPERACILLIN SODIUM 2.25 G: 250; 2 INJECTION, SOLUTION INTRAVENOUS at 14:09

## 2017-01-01 RX ADMIN — PANTOPRAZOLE SODIUM 40 MG: 40 INJECTION, POWDER, FOR SOLUTION INTRAVENOUS at 08:14

## 2017-01-01 RX ADMIN — TAZOBACTAM SODIUM AND PIPERACILLIN SODIUM 2.25 G: 250; 2 INJECTION, SOLUTION INTRAVENOUS at 07:39

## 2017-01-01 RX ADMIN — ASPIRIN 81 MG: 81 TABLET, COATED ORAL at 08:26

## 2017-01-01 RX ADMIN — IPRATROPIUM BROMIDE AND ALBUTEROL SULFATE 3 ML: .5; 3 SOLUTION RESPIRATORY (INHALATION) at 12:05

## 2017-01-01 RX ADMIN — NYSTATIN 500000 UNITS: 100000 SUSPENSION ORAL at 17:04

## 2017-01-01 RX ADMIN — IPRATROPIUM BROMIDE AND ALBUTEROL SULFATE 3 ML: .5; 3 SOLUTION RESPIRATORY (INHALATION) at 15:16

## 2017-01-01 RX ADMIN — TAZOBACTAM SODIUM AND PIPERACILLIN SODIUM 2.25 G: 250; 2 INJECTION, SOLUTION INTRAVENOUS at 00:55

## 2017-01-01 RX ADMIN — CALCITRIOL 0.25 MCG: 0.25 CAPSULE, LIQUID FILLED ORAL at 07:39

## 2017-01-01 RX ADMIN — AMLODIPINE BESYLATE 10 MG: 10 TABLET ORAL at 08:12

## 2017-01-01 RX ADMIN — METOPROLOL TARTRATE 100 MG: 100 TABLET, FILM COATED ORAL at 21:31

## 2017-01-01 RX ADMIN — AMLODIPINE BESYLATE 10 MG: 10 TABLET ORAL at 12:34

## 2017-01-01 RX ADMIN — IPRATROPIUM BROMIDE AND ALBUTEROL SULFATE 3 ML: .5; 3 SOLUTION RESPIRATORY (INHALATION) at 19:20

## 2017-01-01 RX ADMIN — INSULIN ASPART 2 UNITS: 100 INJECTION, SOLUTION INTRAVENOUS; SUBCUTANEOUS at 18:13

## 2017-01-01 RX ADMIN — EPOPROSTENOL SODIUM: 0.5 INJECTION, POWDER, LYOPHILIZED, FOR SOLUTION INTRAVENOUS at 08:15

## 2017-01-01 RX ADMIN — BUMETANIDE 2 MG: 0.25 INJECTION INTRAMUSCULAR; INTRAVENOUS at 09:06

## 2017-01-01 RX ADMIN — ALBUMIN (HUMAN) 25 G: 12.5 SOLUTION INTRAVENOUS at 14:29

## 2017-01-01 RX ADMIN — BUMETANIDE 1 MG: 0.25 INJECTION INTRAMUSCULAR; INTRAVENOUS at 20:47

## 2017-01-01 RX ADMIN — METOPROLOL TARTRATE 100 MG: 100 TABLET, FILM COATED ORAL at 21:58

## 2017-01-01 RX ADMIN — IPRATROPIUM BROMIDE AND ALBUTEROL SULFATE 3 ML: .5; 3 SOLUTION RESPIRATORY (INHALATION) at 11:38

## 2017-01-01 RX ADMIN — IPRATROPIUM BROMIDE AND ALBUTEROL SULFATE 3 ML: .5; 3 SOLUTION RESPIRATORY (INHALATION) at 15:40

## 2017-01-01 RX ADMIN — TAZOBACTAM SODIUM AND PIPERACILLIN SODIUM 2.25 G: 250; 2 INJECTION, SOLUTION INTRAVENOUS at 13:54

## 2017-01-01 RX ADMIN — TAZOBACTAM SODIUM AND PIPERACILLIN SODIUM 2.25 G: 250; 2 INJECTION, SOLUTION INTRAVENOUS at 05:08

## 2017-01-01 RX ADMIN — IPRATROPIUM BROMIDE AND ALBUTEROL SULFATE 3 ML: .5; 3 SOLUTION RESPIRATORY (INHALATION) at 19:33

## 2017-01-01 RX ADMIN — MORPHINE SULFATE 2 MG: 2 INJECTION, SOLUTION INTRAMUSCULAR; INTRAVENOUS at 01:34

## 2017-01-01 RX ADMIN — EPOPROSTENOL SODIUM: 0.5 INJECTION, POWDER, LYOPHILIZED, FOR SOLUTION INTRAVENOUS at 19:09

## 2017-01-01 RX ADMIN — ALBUTEROL SULFATE 2.5 MG: 2.5 SOLUTION RESPIRATORY (INHALATION) at 05:51

## 2017-01-01 RX ADMIN — IPRATROPIUM BROMIDE AND ALBUTEROL SULFATE 3 ML: .5; 3 SOLUTION RESPIRATORY (INHALATION) at 11:59

## 2017-01-01 RX ADMIN — METOPROLOL TARTRATE 50 MG: 50 TABLET, FILM COATED ORAL at 08:49

## 2017-01-01 RX ADMIN — METOPROLOL TARTRATE 50 MG: 50 TABLET, FILM COATED ORAL at 09:06

## 2017-01-01 RX ADMIN — ALBUMIN (HUMAN) 12.5 G: 12.5 SOLUTION INTRAVENOUS at 09:03

## 2017-01-01 RX ADMIN — ALBUMIN (HUMAN) 12.5 G: 12.5 SOLUTION INTRAVENOUS at 15:02

## 2017-01-01 RX ADMIN — WATER 20 NG/KG/MIN: 1 SOLUTION INTRAVENOUS at 08:15

## 2017-01-01 RX ADMIN — ALBUMIN (HUMAN) 25 G: 12.5 SOLUTION INTRAVENOUS at 03:59

## 2017-01-01 RX ADMIN — FENTANYL CITRATE 50 MCG/HR: 50 INJECTION, SOLUTION INTRAMUSCULAR; INTRAVENOUS at 03:35

## 2017-01-01 RX ADMIN — MEROPENEM 500 MG: 500 INJECTION, POWDER, FOR SOLUTION INTRAVENOUS at 02:06

## 2017-01-01 RX ADMIN — IPRATROPIUM BROMIDE AND ALBUTEROL SULFATE 3 ML: .5; 3 SOLUTION RESPIRATORY (INHALATION) at 08:00

## 2017-01-01 RX ADMIN — PANTOPRAZOLE SODIUM 40 MG: 40 TABLET, DELAYED RELEASE ORAL at 16:34

## 2017-01-01 RX ADMIN — OXYCODONE HYDROCHLORIDE 5 MG: 5 TABLET ORAL at 11:44

## 2017-01-01 RX ADMIN — INSULIN ASPART 1 UNITS: 100 INJECTION, SOLUTION INTRAVENOUS; SUBCUTANEOUS at 12:35

## 2017-01-01 RX ADMIN — MIDAZOLAM HYDROCHLORIDE 0.5 MG: 1 INJECTION, SOLUTION INTRAMUSCULAR; INTRAVENOUS at 12:00

## 2017-01-01 RX ADMIN — IPRATROPIUM BROMIDE AND ALBUTEROL SULFATE 3 ML: .5; 3 SOLUTION RESPIRATORY (INHALATION) at 16:22

## 2017-01-01 RX ADMIN — ALBUTEROL SULFATE 2.5 MG: 2.5 SOLUTION RESPIRATORY (INHALATION) at 21:33

## 2017-01-01 RX ADMIN — VITAMIN D, TAB 1000IU (100/BT) 1000 UNITS: 25 TAB at 08:19

## 2017-01-01 RX ADMIN — INSULIN ASPART 2 UNITS: 100 INJECTION, SOLUTION INTRAVENOUS; SUBCUTANEOUS at 17:04

## 2017-01-01 RX ADMIN — ALBUTEROL SULFATE 2.5 MG: 2.5 SOLUTION RESPIRATORY (INHALATION) at 23:18

## 2017-01-01 RX ADMIN — AMLODIPINE BESYLATE 10 MG: 10 TABLET ORAL at 08:20

## 2017-01-01 RX ADMIN — BENZONATATE 100 MG: 100 CAPSULE ORAL at 18:26

## 2017-01-01 RX ADMIN — PROPOFOL 50 MG: 10 INJECTION, EMULSION INTRAVENOUS at 21:33

## 2017-01-01 RX ADMIN — GUAIFENESIN AND CODEINE PHOSPHATE 5 ML: 100; 10 SOLUTION ORAL at 17:30

## 2017-01-01 RX ADMIN — IPRATROPIUM BROMIDE AND ALBUTEROL SULFATE 3 ML: .5; 3 SOLUTION RESPIRATORY (INHALATION) at 15:47

## 2017-01-01 RX ADMIN — METOPROLOL TARTRATE 100 MG: 100 TABLET, FILM COATED ORAL at 20:48

## 2017-01-01 RX ADMIN — IPRATROPIUM BROMIDE AND ALBUTEROL SULFATE 3 ML: .5; 3 SOLUTION RESPIRATORY (INHALATION) at 16:27

## 2017-01-01 RX ADMIN — PRAVASTATIN SODIUM 80 MG: 20 TABLET ORAL at 21:51

## 2017-01-01 RX ADMIN — IPRATROPIUM BROMIDE AND ALBUTEROL SULFATE 3 ML: .5; 3 SOLUTION RESPIRATORY (INHALATION) at 08:15

## 2017-01-01 RX ADMIN — GUAIFENESIN AND CODEINE PHOSPHATE 5 ML: 100; 10 SOLUTION ORAL at 21:39

## 2017-01-01 RX ADMIN — CEFTRIAXONE 1 G: 1 INJECTION, POWDER, FOR SOLUTION INTRAMUSCULAR; INTRAVENOUS at 22:12

## 2017-01-01 RX ADMIN — TAZOBACTAM SODIUM AND PIPERACILLIN SODIUM 2.25 G: 250; 2 INJECTION, SOLUTION INTRAVENOUS at 01:37

## 2017-01-01 RX ADMIN — OXYCODONE HYDROCHLORIDE 5 MG: 5 TABLET ORAL at 00:05

## 2017-01-01 RX ADMIN — NYSTATIN 500000 UNITS: 100000 SUSPENSION ORAL at 21:13

## 2017-01-01 RX ADMIN — ACETAMINOPHEN 975 MG: 325 TABLET, FILM COATED ORAL at 14:40

## 2017-01-01 RX ADMIN — LIDOCAINE HYDROCHLORIDE 3 ML: 40 INJECTION, SOLUTION RETROBULBAR; TOPICAL at 11:54

## 2017-01-01 RX ADMIN — CALCITRIOL 0.25 MCG: 0.25 CAPSULE, LIQUID FILLED ORAL at 08:27

## 2017-01-01 RX ADMIN — IPRATROPIUM BROMIDE AND ALBUTEROL SULFATE 3 ML: .5; 3 SOLUTION RESPIRATORY (INHALATION) at 20:00

## 2017-01-01 RX ADMIN — TAZOBACTAM SODIUM AND PIPERACILLIN SODIUM 3.38 G: 375; 3 INJECTION, SOLUTION INTRAVENOUS at 19:33

## 2017-01-01 RX ADMIN — MIDAZOLAM 2 MG: 1 INJECTION INTRAMUSCULAR; INTRAVENOUS at 09:45

## 2017-01-01 RX ADMIN — NYSTATIN 500000 UNITS: 100000 SUSPENSION ORAL at 17:09

## 2017-01-01 RX ADMIN — PRAVASTATIN SODIUM 80 MG: 20 TABLET ORAL at 21:37

## 2017-01-01 RX ADMIN — BENZONATATE 100 MG: 100 CAPSULE ORAL at 17:04

## 2017-01-01 RX ADMIN — HUMAN INSULIN 7 UNITS/HR: 100 INJECTION, SOLUTION SUBCUTANEOUS at 21:16

## 2017-01-01 RX ADMIN — NYSTATIN 500000 UNITS: 100000 SUSPENSION ORAL at 08:50

## 2017-01-01 RX ADMIN — BUMETANIDE 2 MG: 0.25 INJECTION INTRAMUSCULAR; INTRAVENOUS at 14:15

## 2017-01-01 RX ADMIN — CALCITRIOL 0.25 MCG: 0.25 CAPSULE, LIQUID FILLED ORAL at 08:12

## 2017-01-01 RX ADMIN — WATER 20 NG/KG/MIN: 1 SOLUTION INTRAVENOUS at 16:08

## 2017-01-01 RX ADMIN — CALCITRIOL 0.25 MCG: 0.25 CAPSULE, LIQUID FILLED ORAL at 08:26

## 2017-01-01 RX ADMIN — FLUTICASONE FUROATE AND VILANTEROL TRIFENATATE 1 PUFF: 100; 25 POWDER RESPIRATORY (INHALATION) at 08:01

## 2017-01-01 RX ADMIN — VASOPRESSIN 2.4 UNITS/HR: 20 INJECTION INTRAVENOUS at 11:46

## 2017-01-01 RX ADMIN — METOPROLOL TARTRATE 100 MG: 100 TABLET, FILM COATED ORAL at 08:42

## 2017-01-01 RX ADMIN — METOPROLOL TARTRATE 100 MG: 100 TABLET, FILM COATED ORAL at 08:50

## 2017-01-01 RX ADMIN — INSULIN ASPART 1 UNITS: 100 INJECTION, SOLUTION INTRAVENOUS; SUBCUTANEOUS at 11:51

## 2017-01-01 RX ADMIN — VITAMIN D, TAB 1000IU (100/BT) 1000 UNITS: 25 TAB at 19:33

## 2017-01-01 RX ADMIN — IPRATROPIUM BROMIDE AND ALBUTEROL SULFATE 3 ML: .5; 3 SOLUTION RESPIRATORY (INHALATION) at 20:04

## 2017-01-01 RX ADMIN — WATER 20 NG/KG/MIN: 1 SOLUTION INTRAVENOUS at 14:49

## 2017-01-01 RX ADMIN — PROPOFOL 5 MCG/KG/MIN: 10 INJECTION, EMULSION INTRAVENOUS at 12:14

## 2017-01-01 RX ADMIN — AZITHROMYCIN MONOHYDRATE 500 MG: 500 INJECTION, POWDER, LYOPHILIZED, FOR SOLUTION INTRAVENOUS at 15:46

## 2017-01-01 RX ADMIN — FENTANYL CITRATE 100 MCG: 50 INJECTION INTRAMUSCULAR; INTRAVENOUS at 01:37

## 2017-01-01 RX ADMIN — METOPROLOL TARTRATE 100 MG: 100 TABLET, FILM COATED ORAL at 20:45

## 2017-01-01 RX ADMIN — METHYLPREDNISOLONE SODIUM SUCCINATE 125 MG: 125 INJECTION, POWDER, FOR SOLUTION INTRAMUSCULAR; INTRAVENOUS at 23:02

## 2017-01-01 RX ADMIN — IPRATROPIUM BROMIDE AND ALBUTEROL SULFATE 3 ML: .5; 3 SOLUTION RESPIRATORY (INHALATION) at 07:19

## 2017-01-01 RX ADMIN — ALBUTEROL SULFATE 2.5 MG: 2.5 SOLUTION RESPIRATORY (INHALATION) at 05:54

## 2017-01-01 RX ADMIN — BUMETANIDE 1 MG: 0.25 INJECTION INTRAMUSCULAR; INTRAVENOUS at 08:12

## 2017-01-01 RX ADMIN — PRAVASTATIN SODIUM 80 MG: 20 TABLET ORAL at 00:52

## 2017-01-01 RX ADMIN — LORAZEPAM 2 MG: 2 INJECTION INTRAMUSCULAR; INTRAVENOUS at 00:35

## 2017-01-01 RX ADMIN — BUMETANIDE 1 MG: 0.25 INJECTION INTRAMUSCULAR; INTRAVENOUS at 19:52

## 2017-01-01 RX ADMIN — IPRATROPIUM BROMIDE AND ALBUTEROL SULFATE 3 ML: .5; 3 SOLUTION RESPIRATORY (INHALATION) at 15:45

## 2017-01-01 RX ADMIN — ACETAMINOPHEN 975 MG: 325 TABLET, FILM COATED ORAL at 21:10

## 2017-01-01 RX ADMIN — AMLODIPINE BESYLATE 10 MG: 10 TABLET ORAL at 08:50

## 2017-01-01 RX ADMIN — NYSTATIN 500000 UNITS: 100000 SUSPENSION ORAL at 12:18

## 2017-01-01 RX ADMIN — BENZONATATE 100 MG: 100 CAPSULE ORAL at 19:49

## 2017-01-01 RX ADMIN — POLYETHYLENE GLYCOL 3350 17 G: 17 POWDER, FOR SOLUTION ORAL at 20:43

## 2017-01-01 RX ADMIN — PROPOFOL 50 MG: 10 INJECTION, EMULSION INTRAVENOUS at 21:36

## 2017-01-01 RX ADMIN — FLUTICASONE FUROATE AND VILANTEROL TRIFENATATE 1 PUFF: 100; 25 POWDER RESPIRATORY (INHALATION) at 08:21

## 2017-01-01 RX ADMIN — ACETAMINOPHEN 975 MG: 325 TABLET, FILM COATED ORAL at 05:33

## 2017-01-01 RX ADMIN — IPRATROPIUM BROMIDE AND ALBUTEROL SULFATE 3 ML: .5; 3 SOLUTION RESPIRATORY (INHALATION) at 08:24

## 2017-01-01 RX ADMIN — METOPROLOL TARTRATE 10 MG: 5 INJECTION INTRAVENOUS at 09:18

## 2017-01-01 RX ADMIN — IPRATROPIUM BROMIDE AND ALBUTEROL SULFATE 3 ML: .5; 3 SOLUTION RESPIRATORY (INHALATION) at 19:24

## 2017-01-01 RX ADMIN — IPRATROPIUM BROMIDE AND ALBUTEROL SULFATE 3 ML: .5; 3 SOLUTION RESPIRATORY (INHALATION) at 15:59

## 2017-01-01 RX ADMIN — IPRATROPIUM BROMIDE AND ALBUTEROL SULFATE 3 ML: .5; 3 SOLUTION RESPIRATORY (INHALATION) at 11:56

## 2017-01-01 RX ADMIN — PANTOPRAZOLE SODIUM 40 MG: 40 TABLET, DELAYED RELEASE ORAL at 08:50

## 2017-01-01 RX ADMIN — TAZOBACTAM SODIUM AND PIPERACILLIN SODIUM 2.25 G: 250; 2 INJECTION, SOLUTION INTRAVENOUS at 06:09

## 2017-01-01 RX ADMIN — IPRATROPIUM BROMIDE AND ALBUTEROL SULFATE 3 ML: .5; 3 SOLUTION RESPIRATORY (INHALATION) at 15:23

## 2017-01-01 RX ADMIN — NOREPINEPHRINE BITARTRATE 0.3 MCG/KG/MIN: 1 INJECTION INTRAVENOUS at 00:18

## 2017-01-01 RX ADMIN — WATER 20 NG/KG/MIN: 1 SOLUTION INTRAVENOUS at 19:09

## 2017-01-01 RX ADMIN — IPRATROPIUM BROMIDE AND ALBUTEROL SULFATE 3 ML: .5; 3 SOLUTION RESPIRATORY (INHALATION) at 11:26

## 2017-01-01 RX ADMIN — IPRATROPIUM BROMIDE AND ALBUTEROL SULFATE 3 ML: .5; 3 SOLUTION RESPIRATORY (INHALATION) at 19:44

## 2017-01-01 RX ADMIN — ALBUTEROL SULFATE 2.5 MG: 2.5 SOLUTION RESPIRATORY (INHALATION) at 12:55

## 2017-01-01 RX ADMIN — INSULIN ASPART 1 UNITS: 100 INJECTION, SOLUTION INTRAVENOUS; SUBCUTANEOUS at 00:47

## 2017-01-01 RX ADMIN — TAZOBACTAM SODIUM AND PIPERACILLIN SODIUM 3.38 G: 375; 3 INJECTION, SOLUTION INTRAVENOUS at 01:22

## 2017-01-01 RX ADMIN — ACETAMINOPHEN 975 MG: 325 TABLET, FILM COATED ORAL at 14:27

## 2017-01-01 RX ADMIN — PROPOFOL 50 MG: 10 INJECTION, EMULSION INTRAVENOUS at 21:37

## 2017-01-01 RX ADMIN — METOPROLOL TARTRATE 100 MG: 100 TABLET, FILM COATED ORAL at 08:12

## 2017-01-01 RX ADMIN — VITAMIN D, TAB 1000IU (100/BT) 1000 UNITS: 25 TAB at 08:27

## 2017-01-01 RX ADMIN — SALINE NASAL SPRAY 1 SPRAY: 1.5 SOLUTION NASAL at 06:45

## 2017-01-01 RX ADMIN — MIDAZOLAM 1 MG: 1 INJECTION INTRAMUSCULAR; INTRAVENOUS at 03:42

## 2017-01-01 RX ADMIN — METHYLPREDNISOLONE SODIUM SUCCINATE 62.5 MG: 125 INJECTION, POWDER, FOR SOLUTION INTRAMUSCULAR; INTRAVENOUS at 20:45

## 2017-01-01 RX ADMIN — BENZONATATE 100 MG: 100 CAPSULE ORAL at 21:39

## 2017-01-01 RX ADMIN — IPRATROPIUM BROMIDE AND ALBUTEROL SULFATE 3 ML: .5; 3 SOLUTION RESPIRATORY (INHALATION) at 20:19

## 2017-01-01 RX ADMIN — IPRATROPIUM BROMIDE AND ALBUTEROL SULFATE 3 ML: .5; 3 SOLUTION RESPIRATORY (INHALATION) at 07:45

## 2017-01-01 RX ADMIN — VITAMIN D, TAB 1000IU (100/BT) 1000 UNITS: 25 TAB at 08:26

## 2017-01-01 RX ADMIN — METHYLPREDNISOLONE SODIUM SUCCINATE 62.5 MG: 125 INJECTION, POWDER, FOR SOLUTION INTRAMUSCULAR; INTRAVENOUS at 13:34

## 2017-01-01 RX ADMIN — ALBUMIN (HUMAN) 12.5 G: 12.5 SOLUTION INTRAVENOUS at 00:49

## 2017-01-01 RX ADMIN — TAZOBACTAM SODIUM AND PIPERACILLIN SODIUM 2.25 G: 250; 2 INJECTION, SOLUTION INTRAVENOUS at 20:19

## 2017-01-01 RX ADMIN — HUMAN INSULIN 4 UNITS/HR: 100 INJECTION, SOLUTION SUBCUTANEOUS at 16:35

## 2017-01-01 RX ADMIN — ASPIRIN 300 MG: 300 SUPPOSITORY RECTAL at 09:57

## 2017-01-01 RX ADMIN — SALINE NASAL SPRAY 1 SPRAY: 1.5 SOLUTION NASAL at 20:54

## 2017-01-01 RX ADMIN — ALBUTEROL SULFATE 2.5 MG: 2.5 SOLUTION RESPIRATORY (INHALATION) at 14:35

## 2017-01-01 RX ADMIN — IPRATROPIUM BROMIDE AND ALBUTEROL SULFATE 3 ML: .5; 3 SOLUTION RESPIRATORY (INHALATION) at 12:02

## 2017-01-01 RX ADMIN — IPRATROPIUM BROMIDE AND ALBUTEROL SULFATE 3 ML: .5; 3 SOLUTION RESPIRATORY (INHALATION) at 11:35

## 2017-01-01 RX ADMIN — VASOPRESSIN 2.4 UNITS/HR: 20 INJECTION INTRAVENOUS at 00:19

## 2017-01-01 RX ADMIN — METHYLPREDNISOLONE SODIUM SUCCINATE 62.5 MG: 125 INJECTION, POWDER, FOR SOLUTION INTRAMUSCULAR; INTRAVENOUS at 12:18

## 2017-01-01 RX ADMIN — EPINEPHRINE 0.03 MCG/KG/MIN: 1 INJECTION PARENTERAL at 00:17

## 2017-01-01 RX ADMIN — BUMETANIDE 1 MG: 0.25 INJECTION INTRAMUSCULAR; INTRAVENOUS at 08:19

## 2017-01-01 RX ADMIN — IPRATROPIUM BROMIDE AND ALBUTEROL SULFATE 3 ML: .5; 3 SOLUTION RESPIRATORY (INHALATION) at 07:22

## 2017-01-01 RX ADMIN — IPRATROPIUM BROMIDE AND ALBUTEROL SULFATE 3 ML: .5; 3 SOLUTION RESPIRATORY (INHALATION) at 19:13

## 2017-01-01 RX ADMIN — NYSTATIN 500000 UNITS: 100000 SUSPENSION ORAL at 13:09

## 2017-01-01 RX ADMIN — TAZOBACTAM SODIUM AND PIPERACILLIN SODIUM 3.38 G: 375; 3 INJECTION, SOLUTION INTRAVENOUS at 07:04

## 2017-01-01 RX ADMIN — PROPOFOL 5 MCG/KG/MIN: 10 INJECTION, EMULSION INTRAVENOUS at 17:32

## 2017-01-01 RX ADMIN — PROPOFOL 50 MG: 10 INJECTION, EMULSION INTRAVENOUS at 21:34

## 2017-01-01 RX ADMIN — NOREPINEPHRINE BITARTRATE 0.27 MCG/KG/MIN: 1 INJECTION INTRAVENOUS at 09:46

## 2017-01-01 RX ADMIN — CALCITRIOL 0.25 MCG: 0.25 CAPSULE, LIQUID FILLED ORAL at 08:50

## 2017-01-01 RX ADMIN — VITAMIN D, TAB 1000IU (100/BT) 1000 UNITS: 25 TAB at 08:50

## 2017-01-01 RX ADMIN — GUAIFENESIN AND CODEINE PHOSPHATE 5 ML: 100; 10 SOLUTION ORAL at 16:46

## 2017-01-01 RX ADMIN — PRAVASTATIN SODIUM 80 MG: 20 TABLET ORAL at 22:43

## 2017-01-01 RX ADMIN — HYDROMORPHONE HYDROCHLORIDE 0.5 MG: 1 INJECTION, SOLUTION INTRAMUSCULAR; INTRAVENOUS; SUBCUTANEOUS at 16:43

## 2017-01-01 ASSESSMENT — ENCOUNTER SYMPTOMS
SHORTNESS OF BREATH: 1
CONSTIPATION: 1
NAUSEA: 0
COUGH: 1
VOMITING: 0

## 2017-01-01 ASSESSMENT — PAIN DESCRIPTION - DESCRIPTORS: DESCRIPTORS: BURNING

## 2017-01-12 NOTE — NURSING NOTE
"Chief Complaint   Patient presents with     Allied Health Visit       Initial /62 mmHg  Wt 183 lb 12.8 oz (83.371 kg) Estimated body mass index is 33.61 kg/(m^2) as calculated from the following:    Height as of 8/30/16: 5' 2\" (1.575 m).    Weight as of this encounter: 183 lb 12.8 oz (83.371 kg).  BP completed using cuff size: devika Hurley, KIERSTEN      Adina Rodriguez is a 81 year old male who comes in today for a Blood Pressure check because of ongoing blood pressure monitoring.    *Document pulse and BP  *Use new set of vitals button for multiple readings.  *Use extended vitals for orthostatic    Vitals as recorded, a large cuff was used. 124/62    Patient is taking medication as prescribed- due to expense pt sometimes only takes half of his medications.  Patient is tolerating medications well.  Patient is monitoring Blood Pressure at home.  Average readings if yes are     Current complaints: none    Disposition: results routed to MD/AP      "

## 2017-02-13 NOTE — TELEPHONE ENCOUNTER
Ferrous gluconate        Last Written Prescription Date:   Last Fill Quantity: ,    # refills:   Last Office Visit with [facility], [facility] or [facility] prescribing provider:  12/7/16      Lab Results   Component Value Date    WBC 9.7 09/22/2015     Lab Results   Component Value Date    RBC 3.55 09/22/2015     Lab Results   Component Value Date    HGB 9.4 09/22/2015     Lab Results   Component Value Date    HCT 29.4 09/22/2015     No components found for: MCT  Lab Results   Component Value Date    MCV 83 09/22/2015     Lab Results   Component Value Date    MCH 26.5 09/22/2015     Lab Results   Component Value Date    MCHC 32.0 09/22/2015     Lab Results   Component Value Date    RDW 15.2 09/22/2015     Lab Results   Component Value Date     09/22/2015       Routing refill request to provider for review/approval because:  Labs not current:  Hgb  Medication is reported/historical  Bouchra Astudillo RN, BSN  [facility]

## 2017-02-13 NOTE — TELEPHONE ENCOUNTER
Chart reviewed.  Rx sent to pt's preferred pharmacy.  Annual CBC added to HM so we catch this the next time Suom is in clinic.    [id number] [place] [facility] [place] [address] [address] [address]    Keon Mathis MD

## 2017-02-13 NOTE — TELEPHONE ENCOUNTER
Reason for Call:  Medication or medication refill:    Do you use a [facility]?  Name of the pharmacy and phone number for the current request:  [facility] - [phone]    Name of the medication requested: Ferrous Gluconate,     Other request: Calling about the status of this refill. You can talk to any pharmacist about this.     Can we leave a detailed message on this number? NO.     Phone number patient can be reached at: Other phone number:  [phone]    Best Time: any    Call taken on 2/13/2017 at 1:08 PM by Arcelia Greco

## 2017-03-15 NOTE — TELEPHONE ENCOUNTER
Routing refill request to provider for review/approval because:  Labs out of range:  Cr    Rhonda AllenRN  [facility]  [phone]

## 2017-03-15 NOTE — TELEPHONE ENCOUNTER
amLODIPine (NORVASC) 10 MG tablet      Last Written Prescription Date: 8/30/2016  Last Fill Quantity: 90 tablet, # refills: 1  Last Office Visit with [facility], [facility] or [facility] prescribing provider: 12/7/2016       Potassium   Date Value Ref Range Status   01/12/2017 4.9 3.4 - 5.3 mmol/L Final     Creatinine   Date Value Ref Range Status   01/12/2017 2.90 (H) 0.66 - 1.25 mg/dL Final     BP Readings from Last 3 Encounters:   01/12/17 124/62   12/07/16 130/80   08/30/16 132/68

## 2017-04-13 PROBLEM — J18.9 PNEUMONIA: Status: ACTIVE | Noted: 2017-01-01

## 2017-04-13 NOTE — H&P
CHIEF COMPLAINT:  Shortness of breath.      HISTORY OF PRESENT ILLNESS:  The history is obtained from his daughter who translates, as the patient only speaks Cambodian.  Adina Rodriguez is an 81-year-old Cambodian gentleman with a history of hypertension, hyperlipidemia, diabetes, and recently diagnosed fibrosis of the lung, who presents to the ER for shortness of breath which has been progressively getting worse.  He has minimal cough, which is mainly dry.  He does endorse paroxysmal nocturnal dyspnea as well as orthopnea.  He does also endorse a weight gain of 15-20 pounds over the past month.  He denies any chest pain or tightness.  He also has a history of chronic kidney disease, stage IV, for which he sees Dr. Valerio.  He denies any documented fevers or chills.  His appetite has been fair.  He has been following up with [facility] recently, and had a CT scan which showed progression of fibrosis.  He was treated with steroids as well as an antibiotic.  This seemed to help his cough.      In the ER over here, he was seen by Dr. Nikole Maldonado.  I discussed care with Dr Maldonado. He was noted to have pneumonia.  He has been initiated on IV Rocephin and azithromycin, and given DuoNeb as well as Solu-Medrol.  I am asked to admit him for further evaluation.      PAST MEDICAL HISTORY:   1.  Type 2 diabetes.   2.  Hyperlipidemia.   3.  Gout.   4.  Chronic kidney disease, stage IV.   5.  Hypertension.   6.  Neuropathy.   7.  Retinopathy.   8.  Congestive heart failure.   9.  Grade 2 diastolic dysfunction.      SOCIAL HISTORY:  He is .  He lives with his wife and his son.  He uses a walker to ambulate.      MEDICATIONS:   1.  Allopurinol.   2.  Amlodipine.   3.  Aspirin.   4.  Bumex.   5.  Losartan.   6.  Omeprazole.   7.  Pravastatin.   8.  Vitamin D.   9.  The rest of his medications are awaiting reconciliation.      PAST SURGICAL HISTORY:  He has had previous endoscopies and colonoscopies.      SOCIAL HISTORY:  He does  not smoke.  He does not drink alcohol.      ALLERGIES:  Lisinopril.     FAMILY HISTORY : Non-contributory to presentation.     REVIEW OF SYSTEMS:  As mentioned in the HPI.  He denies any nausea or vomiting.  He is constipated.  He denies any abdominal pain.  All other systems are extensively reviewed and deemed unremarkable and negative.      PHYSICAL EXAMINATION:   VITAL SIGNS:  His temperature is 97.4.  Pulse is 108.  Blood pressure is 165/82.  Respiratory rate is 22.  O2 sat is 97% on 2 liters.   GENERAL:  The patient is alert, awake, oriented, coherent, nontoxic.  Mild distress due to shortness of breath.   HEENT:  Pupils are equal, round, reactive to light.  Pharynx has no exudate noted.  He has a few missing teeth.   LUNGS:  Crackles bilaterally, more so on the right lower lung base.   HEART:  Tachycardic, S1-S2 normal.  No murmurs, rubs or gallops.   ABDOMEN:  Obese, soft, minimally distended.  No guarding, no rigidity.  He has good bowel sounds.   EXTREMITIES:  2+ edema   SKIN:  There is no rash.   NEUROLOGIC:  He moves all of his extremities.   SKIN: No rash.     LABS:  Lab work obtained here shows a sodium of 144, potassium 4.3, chloride 112, bicarbonate 24, BUN 85, creatinine 2.51, GFR 25, calcium 8.5, anion gap 8.  His BNP is 2178.  His troponin is 0.162.  Glucose is 200.  Venous blood gas on 3 liters shows a pH of 7.35, pCO2 of 43, pO2 of 49, bicarbonate of 24.  On his CBC, his white cell count is 16.7, hemoglobin 10.0, hematocrit 31.1, platelet count 191,000; absolute neutrophil count 13.8.  D-dimer obtained in the ER is 0.6.      Chest x-ray, 2 views, obtained in the ER shows new right base infiltrate with moderate cardiomegaly.      An EKG obtained over here which i reviewed shows sinus rhythm with premature atrial complexes.  There are no significant ST-T wave changes to indicate ischemia.      ASSESSMENT AND PLAN:   1.  Acute respiratory failure:  Likely secondary to underlying pneumonia as well as  possible congestive heart failure.  We will admit him as an inpatient.  He has been initiated on IV Rocephin as well as azithromycin.  I will continue that.  In addition, I will go ahead and diurese the patient with an IV Lasix drip, as he appears quite volume overloaded.  His last echocardiogram that I can obtain was in , which showed a normal ejection fraction.  Hence, we will repeat an echocardiogram.   2.  Elevated troponin:  I suspect this is demand ischemia.  He has no chest pain, tightness, heaviness or pressure.  EKG is nondiagnostic for acute coronary syndrome.   3.  Diabetes:  We will place him on Accu-Cheks along with sliding scale insulin.   4.  Elevated D-dimer:  It is minimally elevated.  His shortness of breath appears more progressive than chronic, likely related to pneumonia and congestive heart failure; hence, I do not think this is playing a role over here.  I do not think we need to do a V/Q scan on him.   5.  CODE STATUS:  FULL CODE.   6.  He is being admitted as an inpatient.         ANGEL LUIS KRUSE MD             D: 2017 22:52   T: 2017 03:00   MT: [id number]      Name:     AIDAN DORAN   MRN:      [id number]        Account:      [id number]   :      1935           Admitted:     [id number]      Document: [id number]       cc: Asher Mathis MD

## 2017-04-13 NOTE — PLAN OF CARE
Problem: Goal Outcome Summary  Goal: Goal Outcome Summary  SLP: Bedside swallow eval completed per MD orders. Pt presents with mild oropharyngeal dysphagia in the setting of baseline cough and increased WOB. Pt missing many upper and lower dentition. Thin liquids via cup resulted in overt s/sx of aspiration marked by coughing when pt took large consecutive sip; pt tolerated additional trials of thin liquids via spoon and cup with no overt s/sx of aspiration with cues to take small sips. Pt tolerated pureed textures, semisolid textures, and regular solid textures with no overt s/sx of aspiration. However pt required prolonged time for mastication on regular solid textures and increased WOB noted. Recommend pt initiate mechanical/dental soft textures and thin liquids. Pt should be fully upright for all PO, take small single sips/bites, alternate consistencies, and pace self. Pt may require intermittent cues/assistance to enfoce safe swallow strategies d/t pts family report of impulsivity with PO. Hold PO should pt demonstrate overt s/sx of aspiration. ST to continue to follow for diet tolerance and advanced trials as appropriate. Pt may meet goals prior to discharge.

## 2017-04-13 NOTE — ED NOTES
Pt up to use urinal  Extremely sob  Will repeat neb treatment   Lungs remain diminished   Coughing non productive  Awaiting room

## 2017-04-13 NOTE — PROGRESS NOTES
" 04/13/17 1122   General Information   Onset Date 04/12/17   Start of Care Date 04/13/17   Referring Physician Jessica Baron MD   Patient Profile Review/OT: Additional Occupational Profile Info See Profile for full history and prior level of function   Patient/Family Goals Statement Pt would like to eat and drink   Swallowing Evaluation Bedside swallow evaluation   Behaviorial Observations WFL (within functional limits)   Mode of current nutrition NPO   Respiratory Status O2 Supply   Type of O2 supply Nasal cannula   Comments The history is obtained from his daughter who translates, as the patient only speaks Cambodian.  Adina Rodriguez is an 81-year-old [place]ian gentleman with a history of hypertension, hyperlipidemia, diabetes, and recently diagnosed fibrosis of the lung, who presents to the ER for shortness of breath which has been progressively getting worse.  He has minimal cough, which is mainly dry. Pt endorses some difficulty swallowing and states he \"chokes\" on food once every 2-3 days. Pt currently eats softer textures at home such as rice and noodle soups. Pts family states pt is very impulsive with food and eats very fast. Pt with baseline increased WOB and baseline dry cough. Bedside swallow eval orders received to assess safety of PO intake.    Clinical Swallow Evaluation   Oral Musculature generally intact   Structural Abnormalities none present   Dentition (missing many upper and lower dentition )   Mucosal Quality adequate   Mandibular Strength and Mobility intact   Oral Labial Strength and Mobility WFL   Lingual Strength and Mobility WFL   Velar Elevation intact   Buccal Strength and Mobility intact   Laryngeal Function Cough;Throat clear;Swallow;Voicing initiated   Oral Musculature Comments Pt with baseline increased WOB and baseline dry cough   Additional Documentation Yes   Clinical Swallow Eval: Thin Liquid Texture Trial   Mode of Presentation, Thin Liquids cup;spoon;self-fed;fed by clinician "   Volume of Liquid or Food Presented 6 oz   Oral Phase of Swallow Premature pharyngeal entry   Pharyngeal Phase of Swallow impaired;coughing/choking   Diagnostic Statement Pt with overt s/sx of aspiration on thin liquids via cup marked by coughing with large consecutive sip; pt tolerated additional trials of thin liquids via spoon and cup with no overt s/sx of aspiration with cues to take small single sips   Clinical Swallow Eval: Puree Solid Texture Trial   Mode of Presentation, Puree spoon;fed by clinician   Volume of Puree Presented 2 tbsp   Oral Phase, Puree WFL   Pharyngeal Phase, Puree intact   Diagnostic Statement Pt tolerated pureed textures via spoon with no overt s/sx of aspiration    Clinical Swallow Eval: Semisolid Texture Trial   Mode of Presentation, Semisolid spoon;fed by clinician   Volume of Semisolid Food Presented 2 tbsp   Oral Phase, Semisolid WFL   Pharyngeal Phase, Semisolid intact   Diagnostic Statement Pt tolerated semisolid textures with no overt s/sx of aspiration    Clinical Swallow Eval: Solid Food Texture Trial   Mode of Presentation, Solid self-fed   Volume of Solid Food Presented 1/4 cracker   Oral Phase, Solid other (see comments)  (prolonged time for mastication )   Pharyngeal Phase, Solid intact   Diagnostic Statement Pt required prolonged time for mastication on regular solid textures; no overt s/sx of aspiration    VFSS Evaluation   VFSS Additional Documentation No   Swallow Compensations   Swallow Compensations Alternate viscosity of consistencies;Pacing;Reduce amounts;Multiple swallow   Results No difficulties noted   Esophageal Phase of Swallow   Patient reports or presents with symptoms of esophageal dysphagia Yes   Esophageal comments Pt reports with s/sx of reflux; pt reportedly takes medication to manage reflux   General Therapy Interventions   Planned Therapy Interventions Dysphagia Treatment   Dysphagia treatment Modified diet education;Instruction of safe swallow  strategies;Compensatory strategies for swallowing   Swallow Eval: Clinical Impressions   Skilled Criteria for Therapy Intervention Skilled criteria met.  Treatment indicated.   Functional Assessment Scale (FAS) 5   Treatment Diagnosis Mild oropharyngeal dysphagia   Diet texture recommendations Thin liquids  (mechanical soft)   Recommended Feeding/Eating Techniques alternate between small bites and sips of food/liquid;maintain upright posture during/after eating for 30 mins;hard swallow w/ each bite or sip;small sips/bites;no straws   Demonstrates Need for Referral to Another Service occupational therapy;physical therapy;respiratory therapy   Therapy Frequency 5 times/wk   Predicted Duration of Therapy Intervention (days/wks) 1 week   Anticipated Discharge Disposition extended care facility   Risks and Benefits of Treatment have been explained. Yes   Patient, family and/or staff in agreement with Plan of Care Yes   Clinical Impression Comments SLP: Bedside swallow eval completed per MD orders. Pt presents with mild oropharyngeal dysphagia in the setting of baseline cough and increased WOB. Pt missing many upper and lower dentition. Thin liquids via cup resulted in overt s/sx of aspiration marked by coughing when pt took large consecutive sip; pt tolerated additional trials of thin liquids via spoon and cup with no overt s/sx of aspiration with cues to take small sips. Pt tolerated pureed textures, semisolid textures, and regular solid textures with no overt s/sx of aspiration. However pt required prolonged time for mastication on regular solid textures and increased WOB noted. Recommend pt initiate mechanical/dental soft textures and thin liquids. Pt should be fully upright for all PO, take small single sips/bites, alternate consistencies, and pace self. Pt may require intermittent cues/assistance to enfoce safe swallow strategies d/t pts family report of impulsivity with PO. Hold PO should pt demonstrate overt s/sx  of aspiration. ST to continue to follow for diet tolerance and advanced trials as appropriate. Pt may meet goals prior to discharge.    Total Evaluation Time   Total Evaluation Time (Minutes) 10

## 2017-04-13 NOTE — PROGRESS NOTES
Chart reviewed for Medical/Telemetry-3 admission from the Emergency Department.  Samantha Grijalva, RN, BSN (Shift Charge RN)  Medical/Telemetry - 3

## 2017-04-13 NOTE — PHARMACY-ADMISSION MEDICATION HISTORY
Admission medication history interview status for this patient is complete. See [facility] admission navigator for allergy information, prior to admission medications and immunization status.     Medication history interview source(s):Patient  Medication history resources (including written lists, pill bottles, clinic record):[facility] List  [facility]    Changes made to PTA medication list:  Added: Calcitriol  Deleted: None  Changed: Losartan from 100mg to 50mg    Actions taken by pharmacist (provider contacted, etc):None     Additional medication history information:None    Medication reconciliation/reorder completed by provider prior to medication history? No    For patients on insulin therapy: No    Prior to Admission medications    Medication Sig Last Dose Taking? Auth Provider   fluticasone-vilanterol (BREO ELLIPTA) 100-25 MCG/INH oral inhaler Inhale 1 puff into the lungs daily 4/12/2017 at am Yes Reported, Patient   bumetanide (BUMEX) 1 MG tablet Take 1 mg by mouth daily 4/12/2017 at am Yes Unknown, Entered By History   losartan (COZAAR) 100 MG tablet Take 50 mg by mouth daily 4/12/2017 at am Yes Unknown, Entered By History   calcitRIOL (ROCALTROL) 0.25 MCG capsule Take 0.25 mcg by mouth daily 4/12/2017 at am Yes Unknown, Entered By History   Vitamin D, Cholecalciferol, 1000 UNITS TABS Take 1 tablet by mouth daily 4/12/2017 at am Yes Unknown, Entered By History   amLODIPine (NORVASC) 10 MG tablet Take 1 tablet (10 mg) by mouth At Bedtime 4/11/2017 at Unknown time Yes Keon Mathis Jr., MD   ferrous gluconate (FERGON) 324 (38 FE) MG tablet Take 1 tablet (325 mg) by mouth every other day 4/12/2017 at am Yes Keon Mathis Jr., MD   omeprazole (PRILOSEC) 20 MG CR capsule Take 1 capsule (20 mg) by mouth daily 4/12/2017 at am Yes Keon Mathis Jr., MD   allopurinol (ZYLOPRIM) 300 MG tablet Take 1 tablet (300 mg) by mouth daily 4/10/2017 at Unknown time Yes Keon Mathis Jr., MD    pravastatin (PRAVACHOL) 80 MG tablet Take 1 tablet (80 mg) by mouth At Bedtime 4/10/2017 at Unknown time Yes Keon Mathis Jr., MD   ASPIRIN 81 MG OR TABS 1 tab po QD (Once per day) 4/12/2017 at am Yes Bill Jennings MD   blood glucose monitoring (NO BRAND SPECIFIED) test strip Use to test blood sugar 3 times weekly.   Keon Mathis Jr., MD

## 2017-04-13 NOTE — PROVIDER NOTIFICATION
04/13/17 0251   Significant Event   Significant Event Other (see comments)  (troponin 0.168. )     MD notified.  325 ST- troponin 0.168. One before was 0.162. any new orders? Thank you

## 2017-04-13 NOTE — PLAN OF CARE
"Problem: Goal Outcome Summary  Goal: Goal Outcome Summary  Outcome: Improving  Per bedside , pt. States, \" I feel better today, not as short of breath.\"  Started this AM at 3L 02.  Now weaned to 1L.  Continued to encourage coughing, deep breathing.  Coughing up yellow/orange sputum.  Up with SBA, calls appropriately for assistance.  Now eating soft diet after swallow eval.  Echo completed.  Bumex IV.  Will Continue to monitor.      "

## 2017-04-13 NOTE — ED NOTES
Pt states breathing has improved some per family  Still diminished throughout lung fields after listening after neb  Iv bolus completed    Family interpreting remains on oxygen 3liters  sats  99  Feels less sob with oxygen    md updatd on labs  Will send to xray  Plan is VQ scan d/t  Kidney fct   Will cont to monitor

## 2017-04-13 NOTE — PROGRESS NOTES
"[facility]  Hospitalist Progress Note  Kasia Lee MD 04/13/2017    Reason for Stay (Diagnosis): cough and increasing shortness of breath for 3 months         Assessment and Plan:      Summary of Stay: Adina Rodriguez is a 81 year old male admitted on 4/12/2017   Problem List:   1. Inflammatory right lower lung infiltrate  Patient's CT in Feb showed fibrotic changes in the RLL  CT scan one month ago ( March) showed ground glass infiltrate and some honeycombing  Admission chest xray shows changes like the CT one month ago  No signs of pneumonia  No fevers, night sweats, or weight loss  Had course of prednisone for 2 weeks in March  Progressive dyspnea now even with walking 100 feet into pharmacy  Consulted pulmonary over the phone  Unlikely to have a bacterial pneumonia  Will need outpatient bronchoscopy  May have chronic JOSEPH infection  Will check O2 sats on room air with walking    2. Chronic kidney disease stage four from Hypertension and diabetes  Creatinine has increased with IV bid bumex  Will discontinue for now  Not much lower leg edema and no heart failure    3. Diabetes  A1C 7.2  Some elevation to 188 related to IV steroids in ER  Continue sliding scale    4. Hypertension  Continue norvasc 10 mg daily but hold cozaar until improved creatinine    DISPOSITION  Discharge when improved creatinine        Interval History (Subjective):      \" I can not do anything without feeling short winded and weak\"                  Physical Exam:      Last Vital Signs:  /66 (BP Location: Left arm)  Pulse 83  Temp 95.4  F (35.2  C) (Oral)  Resp 18  Ht 1.473 m (4' 10\")  Wt 91.9 kg (202 lb 9.6 oz)  SpO2 96%  BMI 42.34 kg/m2    No fevers    Constitutional: Awake, alert, cooperative, no apparent distress  Discussed plan and history through Cambodian interperter   Respiratory: Good air movement, bilateral coarse inspiratory and exspiratory rales lower lobes posteriorly, no cough and no wheezing "   Cardiovascular: Regular rate and rhythm, normal S1 and S2, and no murmur noted   Abdomen: Normal bowel sounds, soft, non-distended, non-tender   Skin: No rashes, no cyanosis, dry to touch   Neuro: Alert and oriented x3, no focal weakness, numbness, memory loss   Extremities: No edema, normal range of motion, good pulses   Other(s): Eating well    Less exertional dyspnea with oxygen   All other systems: Negative          Medications:      All current medications were reviewed with changes reflected in problem list.         Data:      All new lab and imaging data was reviewed.   Labs Na 144 K 4.3  Bicarb 24  Creat 2.51 BUN 85 WBC 16.7  Hemoglobin 10.0 plts 191  7.35  PCO2 43 venous  procalcitonin 0.50  Imaging: CT 3/14/2017 patchy ground glass opacity in right lower lobe with some progression of fibrosis RLL  Mild mediastinal lymph node enlargement

## 2017-04-13 NOTE — ED NOTES
Shortness of breath began 4 days ago and worsening symptoms.  Cough present.  Room air sats upon arrival 85%  Oxygen placed.  Patient alert and oriented x3.  Airway, breathing and circulation intact.

## 2017-04-13 NOTE — PROGRESS NOTES
Pt is a/o x4. VSS.Tele: Sinus Arrhythmia with PAC's.   Cambodian speaking, interpretor will be here today, Fri, Sat and Sunday from 10-12. NPO for swallow eval. Up with assist of 1. 3 liters of O2 via nasal cannula. O2 stats 98%. Short of breath with exertion. Lung sounds diminished with expiratory wheezes. Last BM was Sunday 4/9. All questions and concerns addressed.

## 2017-04-13 NOTE — PROGRESS NOTES
"[facility] RCAT     Date: 4-13-17    Admission Dx: PNA    Pulmonary History idopathic pulmonary fibrosis    Home Nebulizer/MDI Use: Recent breo and albuterol for a chronic cough    Home Oxygen: None    Acuity Level (RCAT flow sheet): Acuity Level 3    Aerosol Therapy initiated: Duoneb QID, Albuterol Q2 prn        Pulmonary Hygiene initiated: Coughing and Deep breathing      Volume Expansion initiated: Incentive Spirometery      Current Oxygen Requirements: 3LPM NC    Current SpO2: 93%    Re-evaluation date: 4-16-17    Patient Education: Patient is cambodian speaking daughter translated what the nebulizer's are supposed to and their side effects/ Benefits.      See \"RT Assessments\" flow sheet for patient assessment scoring and Acuity Level Details.             "

## 2017-04-14 NOTE — PLAN OF CARE
Problem: Goal Outcome Summary  Goal: Goal Outcome Summary  Outcome: No Change  Pt is a/o x4. VSS.Tele: SR/ST. Cambodian speaking, interpretor will be here Fri, Sat and Sunday from 10-12. Swallow eval done today, mech soft with thin liquids ok. Up with assist of 1. 2 liters of O2 via nasal cannula. O2 stats 98%. Short of breath with exertion. Lung sounds diminished.

## 2017-04-14 NOTE — PROGRESS NOTES
"[facility]  Hospitalist Progress Note  Kasia Lee MD 04/14/2017    Reason for Stay (Diagnosis): cough and increasing shortness of breath for 3 months         Assessment and Plan:      Summary of Stay: Adina Rodriguez is a 81 year old male admitted on 4/12/2017   Problem List:   1. Inflammatory right lower lung infiltrate  Present since CT scan one month ago( ground glass)  No signs of  Acute pneumonia  No fevers, night sweats  Progressive dyspnea now even with walking 100 feet into pharmacy  Prior pulmonary consult raised the question of recurrent aspiration  And that is a possibility with the way the patients eats quickly and has coughing and choking spells  Will continue IV zosyn    2. Subacute hypoxic respiratory failure  Will definitely need continuous oxygen on discharge  as has hypoxia at rest and with walking      2. Chronic kidney disease stage four   from Hypertension and diabetes  Creatinine has increased with IV bid bumex  discontinued 4/13 started for ?? Heart failure  Not much lower leg edema and dyspnea is from the lungs  Creatinine still increased now 2.67    3. Diabetes  A1C 7.2  Some elevation to 188 related to IV steroids in ER  Continue sliding scale    4. Hypertension  Continue norvasc 10 mg daily but hold cozaar until improved creatinine    DISPOSITION  Discharge when improved creatinine        Interval History (Subjective):      \" I feel a little less short of breath with walking\"                  Physical Exam:      Last Vital Signs:  /60 (BP Location: Left arm)  Pulse 94  Temp 96.8  F (36  C) (Oral)  Resp 16  Ht 1.473 m (4' 10\")  Wt 89.5 kg (197 lb 6.4 oz)  SpO2 98%  BMI 41.26 kg/m2    No fevers    Constitutional: Awake, alert, cooperative, no apparent distress  Discussed plan and history through Cambodian interperter   Respiratory: Good air movement, bilateral coarse inspiratory and exspiratory rales lower lobes posteriorly, mild improvement on the right no " cough and no wheezing   Cardiovascular: Regular rate and rhythm, normal S1 and S2, and no murmur noted   Abdomen: Normal bowel sounds, soft, non-distended, non-tender   Skin: No rashes, no cyanosis, dry to touch   Neuro: Alert and oriented x3, no focal weakness, numbness, memory loss   Extremities: No edema, normal range of motion, good pulses   Other(s): Eating well    Less exertional dyspnea with oxygen   All other systems: Negative          Medications:      All current medications were reviewed with changes reflected in problem list.         Data:      All new lab and imaging data was reviewed.   Labs Na 144 K 5.4  Bicarb 24  Creat 2.67 up from 2.51   BUN 98 up from 85 WBC 16.7  Hemoglobin 10.0 plts 191  7.35  PCO2 43 venous  procalcitonin 0.50  Imaging: CT 3/14/2017 patchy ground glass opacity in right lower lobe with some progression of fibrosis RLL  Mild mediastinal lymph node enlargement

## 2017-04-14 NOTE — PLAN OF CARE
Problem: Goal Outcome Summary  Goal: Goal Outcome Summary  Outcome: Therapy, progress toward functional goals as expected  SLP: Pt seen for dysphagia f/u. Pt and RN report good tolerance of current diet level and pt with baseline increased WOB. Pt tolerated thin liquids via cup, semisolid textures, and regular solid textures with no overt s/sx of aspiration. Recommend advance to regular textures and thin liquids. Pt should self-select softer textures, sit fully upright for all PO, take small single sips/bites, pace self, and alternate consistencies. ST to continue to follow to assess diet tolerance. Anticipate pt will not require ongoing ST for dysphagia upon discharge.

## 2017-04-14 NOTE — PLAN OF CARE
Problem: Goal Outcome Summary  Goal: Goal Outcome Summary  Outcome: Improving  Tele SR. A&OX4. Denies pain. Complains of SOB at times, especially with exertion. Has 4 L of O2 at rest to maintain sats >88%. Requires 5 L of O2 with activity. Lung sounds are diminished. HR elevated in the 120's with activity. Returns to 80-90 while at rest.  and 120. Ambulates with standby assist.

## 2017-04-14 NOTE — PROGRESS NOTES
Pt is a/o x4. VSS. Denies pain. Cambodian speaking.  will be here today, Saturday and Sunday from 10-12. Tele-SR with PAC's.O2 is 90-94% on 2 liters via nasal cannula. Lung sounds diminished.  Diet is mechanical soft with thin liquids. Up with assist of 1. All questions and concerns answered.

## 2017-04-14 NOTE — PROGRESS NOTES
Patient has been assessed for Home Oxygen needs.  Oxygen readings:   *   RA - at rest  Pulse oximetry SPO2 81-83%  *   RA - during activity/with exercise SPO2 71-81%  *   O2 at  4 liters/minute (at rest) ...SPO2 4 L- 89-93%  *   O2 at  4 liters/minute (during activity/with exercise) ...4 L- 84-91%

## 2017-04-15 NOTE — PROVIDER NOTIFICATION
"   04/15/17 0255   Significant Event   Significant Event Other (see comments)  (Trop 0.209)   Paged MD \"Trop 0.209. Chest pain less after Morphine. Please advise. \" MD aware. Continue to monitor. No new orders at this time.     Paged MD  [phone] \"Trop 0.250. Please advise if needed. \"  "

## 2017-04-15 NOTE — PROGRESS NOTES
X-Cover    Notified about pt having increased work of breathing. HR up in 130's.  Admitted with pneumonia, possible 2/2 aspiration.     On my exam, he is sitting up at edge of bed, reports mild left sided chest discomfort. Lung exam reveals coarse crackles at lung base, and mild exp wheezing.     Patient switched to oxy mask, requiring 6L, his HR is starting to settle down and now in 90's. RR settling down. ?recurrent aspiration ?Volume overload. EKG checked and shows sinus rhythm.     -- STAT Chest XR done -> shows increased vascular congestion. Will give one time dose of IV lasix 20 mg  -- Albuterol neb  -- Serial troponin   -- IV morphine 2 mg     Update:  Chest pain resolved. Pt is having a lot of productive cough, oxygenation stable. Troponin elevated although suspect related to demand ischemia in context of renal disease, continue to trend, consider heparin drip if more dramatic elevation or recurrence of chest pain

## 2017-04-15 NOTE — PROGRESS NOTES
[facility]  Hospitalist Progress Note  Kasia Lee MD 04/15/2017    Reason for Stay (Diagnosis): cough and increasing shortness of breath for 3 months         Assessment and Plan:      Summary of Stay: Adina Rodriguez is a 81 year old male admitted on 4/12/2017   Problem List:   1. Inflammatory right lower lung infiltrate  Present since CT scan one month ago( ground glass)  No signs of acute pneumonia  No fevers, night sweats  Progressive dyspnea now even with walking 100 feet into pharmacy  Prior pulmonary consult raised the question of recurrent aspiration  And that is a possibility with the way the patients eats quickly and has coughing and choking spells  Will continue IV zosyn  And educate on ways to decrease the risk of aspiration    2. Acute diastolic heart failure with pulmonary congestion  Increased work of breathing and increased respiratory rate  PND and orthopnea developed at 2 am  With increased sinus tachycardia ( 136/min), hypertension  And related to holding of his bumex ( because of increased creatinine)  Today gave metoprolol 10 mg IV and then metoprolol 25 mg q12  bumex 2 mg IV and will give daily  Having some mild epigastric lower left chest pain not c/w ischemia  Given oxycodone as should avoid morphine with his poor GFR    3. Subacute hypoxic respiratory failure  Will definitely need continuous oxygen on discharge  as has hypoxia at rest and with walking    2. Chronic kidney disease stage four   from Hypertension and diabetes  Creatinine has increasedafter IV bid bumex and   was discontinued 4/13 ( usual dose is 2 mg daily at home)  Creatinine still increased  2.77 but have to give bumex because of acute heart failure  Will recheck in am potassium is 5.6    3. Diabetes  A1C 7.2  Some elevation to 188 related to IV steroids in ER  Continue sliding scale    4. Hypertension  Continue norvasc 10 mg daily but hold cozaar until improved creatinine  Have started metoprolol at 25 mg bid  "and will increase    DISPOSITION  Discharge when improved creatinine and heart failure        Interval History (Subjective):      \" I had a lot more problems last night with my breathing and my coughing\"                  Physical Exam:      Last Vital Signs:  /76 (BP Location: Left arm)  Pulse 94  Temp 99.2  F (37.3  C) (Oral)  Resp 24  Ht 1.473 m (4' 10\")  Wt 89.5 kg (197 lb 6.4 oz)  SpO2 94%  BMI 41.26 kg/m2    No fevers    Constitutional: Awake, alert, increase respiratory rate at rest sitting on side of bed   Respiratory: Good air movement, bilateral coarse inspiratory and exspiratory rales lower lobes posteriorly, respiration rate increased to 28/min with mild increased effort   Cardiovascular: Regular rate and rhythm, normal S1 and S2, and no murmur noted   Abdomen: Normal bowel sounds, soft, non-distended, non-tender   Skin: No rashes, no cyanosis, dry to touch   Neuro: Alert and oriented x3, no focal weakness, numbness, memory loss   Extremities: No edema, normal range of motion, good pulses   Other(s): Eating well    PND and orthopnea when lying flat  C/o aching at epigastric area and lower left chest  Improved after IV bumex,and metoprolol     All other systems: Negative          Medications:      All current medications were reviewed with changes reflected in problem list.         Data:      All new lab and imaging data was reviewed.   Labs Na 144 K 5.6  Bicarb 24  Creat 2.77 up from 2.51   BUN 93 up from 85  WBC 19.5  Hemoglobin 9.4  plts 177  7.35  PCO2 43 venous     procalcitonin 0.50  Imagin/15 chest xray  Patchy infiltrates both lung bases and Mild pulmonary congestion  CT 3/14/2017 patchy ground glass opacity in right lower lobe with some progression of fibrosis RLL  Mild mediastinal lymph node enlargement  "

## 2017-04-15 NOTE — PLAN OF CARE
"Problem: Goal Outcome Summary  Goal: Goal Outcome Summary   Tele SR/ST. HR in 130's this morning with BP at 172/74. Sats were 93% on 3 L of O2. Trop at 0.244 but trending down. Fine crackles and expiratory wheezes to lower lungs. Patient complained of severe chest pain rated at 9/10. STAT EKG done showing ST. MD notified. Ordered 2 mg Bumex IVP and 20 mg Metoprolol IVP. Also ordered oral metoprolol  HR now in 70-90's. BP now 166/71. 1200 ml urine output. Pt states that breathing has improved but chest pain persists at 9/10. Oxy IR given X1. Patient now sleeping. K is 5.6. MD aware.  Blood sugars 110 and 101. Ambulates with standby assist. Remains on IV Zosyn for probable pneumonia.      0746: MD paged \"Pt having left sided chest pain again. Was given one time dose of IV morphine which worked well overnight. Also, pt's K is 5.6.\"    0816: MD paged \"HR sustaining at 130-135 at rest\"  -20 mg of IV metoprolol and 2 mg of IV Bumex ordered    1032: MD paged \"Trop is 0.244. Trending down.  HR in 70's.\"  "

## 2017-04-15 NOTE — PLAN OF CARE
Problem: Individualization  Goal: Patient Preferences  Outcome: Improving  .Patient has been assessed for Home Oxygen needs.  Oxygen readings:   *   RA - at rest  Pulse oximetry SPO2 87 %  *   RA - during activity/with exercise SPO2 83 %  *   O2 at  2 liters/minute (at rest) ...SPO2 93 %  *   O2 at  3 liters/minute (during activity/with exercise) ...SPO2 90 %

## 2017-04-15 NOTE — PLAN OF CARE
Problem: Goal Outcome Summary  Goal: Goal Outcome Summary  Outcome: No Change  A&Ox4. Increased work of breathing, Increased HR with activity up to 130's, SOB, chest pain, HTN. Lungs diminished, crackles, FELIPE. Updated MD. IV Lasix given after Chest Xray. Tele ST with slightest activity, SR at rest. Trop 0.209 and 0.250. Morphine IV relieved chest pain. Q4 hour BG - 132 and 110.  Did not sleep well overnight.

## 2017-04-15 NOTE — PLAN OF CARE
Problem: Individualization  Goal: Patient Preferences  Outcome: Improving  Sob with exertions sats 93 on 3L. Tele ST/'s when up moving. sr at rest. ls coarse with exp wheezes.

## 2017-04-16 NOTE — PLAN OF CARE
Problem: Goal Outcome Summary  Goal: Goal Outcome Summary  Outcome: Improving  A&OX4. HR elevated at 90's-120's. Scheduled metoprolol given. Dose later increased to 100 mg BID. Tele ST. Rates chest pain at 8/10, likely related to coughing. Pain relieved with Oxycodone and robitussin. Course crackles to bilateral lower lungs. Requires 2-4 L of O2 to maintain sats >88%. Creatinine trending up at 3.09. Potassium 5.4. BNP 3055. MD aware. Pt started on oral Bumex and amblodipine. Continues to receive IV Zosyn for probable pneumonia. Ambulates with standby assist.

## 2017-04-16 NOTE — PLAN OF CARE
"Problem: Goal Outcome Summary  Goal: Goal Outcome Summary  Outcome: Improving        1342: MD paged \"Small amount of fresh blood in BM.\"      "

## 2017-04-16 NOTE — PLAN OF CARE
Problem: Cardiac: Heart Failure (Adult)  Goal: Signs and Symptoms of Listed Potential Problems Will be Absent or Manageable (Cardiac: Heart Failure)  Signs and symptoms of listed potential problems will be absent or manageable by discharge/transition of care (reference Cardiac: Heart Failure (Adult) CPG).   Outcome: Improving  A&Ox4.  phone used for assessment. cambodian speaking. Denies pain. Sob with exertion. desats to 83% on ra with activity. Alarms on. Tele  up to 130's with mild exertion. Metoprolol given slightly early. Robitussin for productive cough with blood tinged sputum. ls crackles. Good appetite.

## 2017-04-16 NOTE — PROGRESS NOTES
[facility]  Hospitalist Progress Note  Kasia Lee MD 04/16/2017    Reason for Stay (Diagnosis): cough and increasing shortness of breath for 3 months         Assessment and Plan:      Summary of Stay: Adina Rodriguez is a 81 year old male admitted on 4/12/2017   Problem List:   1. Inflammatory right lower lung infiltrate  Present since CT scan one month ago( ground glass)  No signs of acute pneumonia  No fevers, night sweats  Progressive dyspnea now even with walking 100 feet into pharmacy  Prior pulmonary consult raised the question of recurrent aspiration  And that is a possibility with the way the patients eats quickly and has coughing and choking spells  Will continue IV zosyn  And educate on ways to decrease the risk of aspiration    2. Acute diastolic heart failure with pulmonary congestion 4/15  With Increased work of breathing and increased respiratory rate  PND and orthopnea    and with sinus tachycardia ( 136/min), hypertension  And related to holding of his bumex ( because of increased creatinine)  Treated with metoprolol 10 mg IV and bumex 2 mg IV   Now on metoprolol 100 mg q 12 ( increased from 50 mg as had persistent elevated HR)  Also added back his norvasc 10 mg to also decrease BP  And continued home dose of bumex 1 mg daily    3. Subacute hypoxic respiratory failure  Secondary to his right lower lobe infiltrate and fibrosis  Will definitely need continuous oxygen on discharge  as has hypoxia at rest and with walking    2. Chronic kidney disease stage four   from Hypertension and diabetes since 2005 baseline creatinine 2.5   Creatinine increased after IV bid bumex and   was discontinued 4/13 ( usual dose is 1 mg daily at home)  Creatinine still increased  2.77 but have to give bumex because of acute heart failure  So far acceptable potassium level 5.6  ( also note that he has hyperkalemia on higher dose of losartan but has had none here since admission)  Will consult renal if  "problems with hyperkalemia and if creatinine continues to rise  ( has been seen previously as outpatient)    3. Diabetes  A1C 7.2  Good glucoses here    Diet controlled    4. Hypertension  Continue norvasc 10 mg daily but hold cozaar until improved creatinine  Have increased metoprolol to 100 mg bid 25 mg bid    DISPOSITION  Discharge when improved creatinine and heart failure        Interval History (Subjective):      \" Today I feel better but I can not walk very far\"                  Physical Exam:      Last Vital Signs:  /80 (BP Location: Left arm)  Pulse 94  Temp 100  F (37.8  C) (Temporal)  Resp 20  Ht 1.473 m (4' 10\")  Wt 88.6 kg (195 lb 6.4 oz)  SpO2 96%  BMI 40.84 kg/m2    No fevers    Constitutional: Awake, alert, comfortable breathing at rest, sitting up in chair   Respiratory: Good air movement, bilateral coarse inspiratory and expiratory rales posterior lower lungs   respiration rate normal and with normal effort   Having blood tinged nasal mucous and post nasal drip    Likely from the oxygen nasal O2   Cardiovascular: Regular rate and rhythm, normal S1 and S2, and no murmur noted   Abdomen: Normal bowel sounds, soft, non-distended, non-tender   Skin: No rashes, no cyanosis, dry to touch   Neuro: Alert and oriented x3, no focal weakness, numbness, memory loss   Extremities: No edema, normal range of motion, good pulses   Other(s): Eating well    Resolved PND and orthopnea  C/o less aching at epigastric area and lower left chest from coughing     All other systems: Negative          Medications:      All current medications were reviewed with changes reflected in problem list.         Data:      All new lab and imaging data was reviewed.   Labs Na 144 K 5.4  Bicarb 21  Creat  3.09 up from 2.77 up from 2.51   BUN 83  WBC 19.5  Hemoglobin 9.4  plts 177  BNP 3055 ( right RV strain from pulmonary hypertension)  7.35  PCO2 43 venous     procalcitonin 0.50  Imagin/15 chest xray  Patchy " infiltrates both lung bases and Mild pulmonary congestion  CT 3/14/2017 patchy ground glass opacity in right lower lobe with some progression of fibrosis RLL  Mild mediastinal lymph node enlargement

## 2017-04-16 NOTE — PLAN OF CARE
Problem: Goal Outcome Summary  Goal: Goal Outcome Summary  Outcome: Improving  Pt much improved from previous night. Denied pain. Tele - ST with -122. Increased O2 to 5 LPM, desats into 80's with slight activity. FELIPE. Lungs diminished, crackles in bases bilat. One PRN neb for SOB with relief.  and 132, no SSI needed. Uses urinal with assist of 1 to empty. Slept on and off overnight.

## 2017-04-17 NOTE — CONSULTS
[facility]    RENAL CONSULTATION NOTE    REFERRING MD:  Dr. Golden    REASON FOR CONSULTATION:  JIMMY/CKD    DATE OF CONSULTATION: 04/17/17    SHORTHAND KEY FOR MY NOTES:  c = with, s = without, p = after, a = before, x = except, asx = asymptomatic, tx = transplant or treatment, sx = symptoms or symptomatic, cx = canceled or culture, rxn = reaction, yday = yesterday, nl = normal, abx = antibiotics, fxn = function, dx = diagnosis, dz = disease, m/h = melena/hematochezia, c/d/l/ha = cramping/dizziness/lightheadedness/headache, d/c = discharge or diarrhea/constipation, f/c/n/v = fevers/chills/nausea/vomiting, cp/sob = chest pain/shortness of breath.    HPI: Adina Rodriguez is a 81 year old male c CKD IV 2 DM2/HTN and IPF who was admitted on 4/12/2017 c sob and found to have pneumonia and CHF.  Pt is followed by Dr. Valerio and Suzanne Zamora CNP in our clinic.  His most recent cr in our clinic (3/27) was 3, at which time he seemed to be doing ok upon review of records from clinic.  He has been a bit higher in the past.      Pt was not feeling well at home for 4d PTA c worsening sob/orthopnea/PND.  No cp/n/v was assoc c these sx.  Pt had recently seen his pulmonologist and was started on abx for a possible pneumonia.  In the ER, the pt was noted to have a RLL infiltrate.  Due to his hypoxia, he was placed on supp o2 and started on abx, nebs and steroids.  He was also given 1L of NS.    When he presented to the ER, his cr was 2.5.  Since admission his cr has risen steadily and is now 3.4.  He was being diuresed, and those were held today due to the cr.  He had decent uo and was net negative yday.      Today, he is feeling weak and is still sob.  He is urinating and denies any abd pain.  No f/c/n/v.    ROS:  A complete review of systems was performed and is x as noted above.    PMH:    Past Medical History:   Diagnosis Date     BENIGN HYPERTENSION 5/19/2003     Chronic kidney disease     stage 4 kidney failure      Esophageal reflux      Gout 3/31/11    polyarticular gout per joint aspiration at [facility]     Hyperlipidemia LDL goal < 100 4/29/04         Internal hemorrhoids with other complication     chronic hemorrhoids, flare frequently     Other specified anemias     presumed mild anemia due to chronic blood loss hemhorroids.     Proteinuria 3/19/12    24 hour protein 1.35 g/24hour     Type 2 diabetes, HbA1c goal < 7% (H)      PSH:    Past Surgical History:   Procedure Laterality Date      COLONOSCOPY THRU STOMA, DIAGNOSTIC  8/00    Negative, at [facility], performed for anemia workup      UGI ENDOSCOPY, SIMPLE EXAM  8/00    Negative, at [facility], performed for anemia workup      UPPER GI & SMALL INTESTINE  8/00    Negative, at [facility], performed for anemia workup     STRESS ECHO (METRO)  5/11/01    STress echo normal at [facility]     MEDICATIONS:      insulin aspart  1-7 Units Subcutaneous TID AC     insulin aspart  1-5 Units Subcutaneous At Bedtime     amLODIPine  10 mg Oral Daily     metoprolol  100 mg Oral BID     acetaminophen  975 mg Oral Q8H     piperacillin-tazobactam  2.25 g Intravenous Q6H     calcitRIOL  0.25 mcg Oral Daily     cholecalciferol  1,000 Units Oral Daily     aspirin EC  81 mg Oral Daily     fluticasone-vilanterol  1 puff Inhalation Daily     pravastatin  80 mg Oral At Bedtime     ipratropium - albuterol 0.5 mg/2.5 mg/3 mL  3 mL Nebulization 4x daily     polyethylene glycol  17 g Oral BID     ALLERGIES:    Allergies as of 04/12/2017 - Heriberto as Reviewed 04/12/2017   Allergen Reaction Noted     Lisinopril  08/05/2008     FH:    Family History   Problem Relation Age of Onset     Family History Negative Mother      D:90 old age     HEART DISEASE Father      D: in war     SH:    Social History     Social History     Marital status:      Spouse name: N/A     Number of children: N/A     Years of education: N/A     Occupational History     [employer]     Social  "History Main Topics     Smoking status: Never Smoker     Smokeless tobacco: Never Used     Alcohol use No     Drug use: No     Sexual activity: Not Currently     Other Topics Concern     Not on file     Social History Narrative     PHYSICAL EXAM:    /67 (BP Location: Left arm)  Pulse 94  Temp 96.6  F (35.9  C) (Oral)  Resp 20  Ht 1.473 m (4' 10\")  Wt 85.1 kg (187 lb 9.6 oz)  SpO2 93%  BMI 39.21 kg/m2    GENERAL: awake, interactive, doesn't look great  HEENT:  Normocephalic. No gross abnormalities.  Dry mouth/tongue.  Poor dentition.  Pupils equal.  EOMI.  No scleral icterus.  CV: RRR, no obv murmurs, no clicks, gallops, or rubs, tr/1+ ble edema.  RESP: Crackles bilaterally with decent efforts; + nc o2  GI: Abdomen o/s/nt, + protuberant, BS present. No masses, organomegaly  MUSCULOSKELETAL: extremities nl - no gross deformities noted  SKIN: no suspicious lesions or rashes, dry to touch  NEURO:  Strength normal and symmetric.  Follows commands.  PSYCH: mood ok, affect appropriate  LYMPH: No palpable ant/post cervical and supraclavicular adenopathy    LABS:      CBC RESULTS:     Recent Labs  Lab 04/17/17  0617 04/15/17  0600 04/13/17  0723 04/12/17 2025   WBC 14.6* 19.5* 15.6* 16.7*   RBC 3.67* 3.46* 3.55* 3.62*   HGB 10.0* 9.4* 9.5* 10.0*   HCT 31.0* 29.7* 30.6* 31.1*   * 177 177 191     BMP RESULTS:    Recent Labs  Lab 04/17/17  0617 04/16/17  1120 04/15/17  0600 04/14/17  0612 04/13/17  0723 04/12/17 2025    144 144 144 146* 144   POTASSIUM 4.8 5.4* 5.6* 5.4* 5.1 4.3   CHLORIDE 111* 112* 113* 113* 114* 112*   CO2 22 21 24 25 20 24   BUN 87* 83* 93* 98* 88* 85*   CR 3.49* 3.09* 2.77* 2.67* 2.61* 2.51*   * 160* 120* 133* 183* 200*   RICHARD 9.2 8.7 8.8 8.2* 8.4* 8.5     INRNo lab results found in last 7 days.   DIAGNOSTICS:  Personally reviewed CXR, Chest CT    A/P:  Adina Rodriguez is a 81 year old male c CKD IV who has JIMMY, volume overload 2 CHF and pneumonia.  He is sick.      1.  JIMMY/CKD.  " Pt's cr is rising due to diuresis and possibly from the cardiorenal syndrome.  He does not have major uremic sx and chemistries are ok now.  His k was high the last few days.  He is TBV up and his crackles are prob more from volume than his usual IPF.  A.  Resume IV bumet 1 qd.  B.  Follow labs, uo, sx.  C.  Avoid nephrotoxics.    2.  Diastolic CHF.  TTE report was reviewed and showed diastolic dysfxn.  LVEF was fine and no RV syst fxn issues.  He still needs to diurese more.  A.  Diuretics as above.  B.  Follow clinically.    3.  HTN.  BP is ok. He isn't having any low BPs p diuresing.    A.  Follow BPs, clinically.  B.  Continue all meds.    4.  Anemia.  Hb is stable.  No signs of bleeding.  A.  Continue to follow hb.    5.  SOB/hypoxia.  He has IPF at baseline, and has a pneumonia and volume overload now.  He is on abx and supp o2.  A.  Continue abx at proper renal dose.  B.  Continue supp o2.  C.  Follow clinically.    6.  FEN.  K is better.  Chemistries are fine.  Not eating much, but will eat [place] food from home.    Thank you for this consultation. We will follow c you.  Please call if any questions.    Attestation:   I have reviewed today's relevant vital signs, notes, medications, labs and imaging.    Camilo Maldonado MD  [facility] - Nephrology  [phone]

## 2017-04-17 NOTE — PLAN OF CARE
Problem: Individualization  Goal: Patient Preferences  Outcome: Improving   Hr SR to ST. . Metoprolol increased today. Sob with exertion sats 96% on 4L. LS crackles BLE +edema. Family updated on poc. Good appetite. Declines walking d/t sob.

## 2017-04-17 NOTE — PLAN OF CARE
Problem: Goal Outcome Summary  Goal: Goal Outcome Summary  Outcome: No Change  A&Ox4. Denied pain. Tele - ST with -115. Desats into 80's with slight activity, 93 % 5LPM NC. FELIPE. Lungs diminished, crackles in bases bilat. Productive cough controlled with PRN cough syrup. . Uses urinal with assist of 1 to empty. Slept on and off overnight.

## 2017-04-17 NOTE — PLAN OF CARE
Problem: Goal Outcome Summary  Goal: Goal Outcome Summary  Outcome: No Change  A & O. VSS ex tachy. HR 1teens. Cr 3.49 today. Bumex on hold and nephrology consulted. Pt appears uncomfortable and SOB. LS coarse, crackles. 4.5L NC to maintain sats >90%. Scheduled nebs. Flagged for sepsis, LA 1.0. On IV zosyn. SLP consulted for possible aspiration.

## 2017-04-17 NOTE — PROGRESS NOTES
[facility]  Hospitalist Progress Note  Jose D Golden MD 04/17/17    Reason for Stay (Diagnosis): respiratory failure         Assessment and Plan:      Summary of Stay: Adina Rodriguez is a 81 year old male w/ hx of DM2, HTN, HLD, neuropathy, diastolic CHF, gout, and CKD4 admitted on 4/12/2017 with cough and sob.  Has had sob for the past month.  Also with 15-20 pound weight gain.  Persistent RLL infiltrate on CT which is possibly now fibrosis.  Has been on ceftriaxone/azithromycin then zosyn for possible recurrent aspiration pna.  No clear aspiration on SLP evaluation.  Does have elevated BNP and weight is up so has been receiving IV diuresis with good UOP, however now rising creatinine.  Holding bumex today and consulting nephrology.        Problem List/Assessment and Plan:   Acute hypoxemic respiratory failure: persistent hypoxia requiring 3-5 L of supplemental O2 with very limited exertional capacity.  Likely multifactorial etiology.  Does have a RLL infiltrate concerning for infection, however has been present for nearly one month now.  Does have cough, but no fevers.  Has not significantly improve despite 6 days of antibiotics to cover for pneumonia.  Possibility of recurrent aspiration raised by previously pulmonary consultation.  Has had coughing once in a while after eating fast, but has been seen by SLP without clear aspiration event.  It is possible that this infiltrate on RLL has progressed to fibrosis and unclear how much of this is reversible.  He did smoke for nearly 40 years, but quite 30 years ago.  Also with evidence for volume overload and likely contribution from diastolic CHF in setting of 15-20 lb weight gain last month.  Unfortunately having difficulty with diuresis due to rising creatinine.   -continue abx  -would like to continue diuresis if able, however on hold until seen by nephrology given rising creatinine  -suspect will need supplemental O2 on discharge, but would like to  improve his exertional capacity prior to this    Possible aspiration pneumonia: as above persistent RLL infiltrate and hypoxia.  Possibility of aspiration, but none seen on swallow evaluation.  -continue zosyn for now (day 5-6 abx)    Acute on chronic diastolic CHF: grade II diastolic dysfunction on TTE.  BNP elevated at 3055.  Weight up 15-20 pounds over one month pta.   Does have PND and orthopnea.  Troponin elevated initially, but no WMA on TTE and more likely due to tachycardia and CKD.  No chest pain.  Metoprolol has been started.  Weight is down 15 pounds, but appears to have some more to go with peripheral edema.  -continue metoprolol 100mg bid for now  -holding losartan for rising creatinine  -hold bumex this morning for rising creatinine    JIMMY on CKD stage IV: creatinine baseline 2.5-2.7 likely.  2 days of rising creatinine with diuresis for acute diastolic CHF.  ARB is on hold.  Some mild hyperkalemia, better today.  -holding losartan  -hold bumex today until seen by nephrology  -nephrology consulted, would like to diurese due to possible contribution from CHF to respiratory failure, but unclear we will be able to    Type II DM: A1c 7.2. Diet controlled.  BG here ok, almost all under 180.    HTN:  pta on norvasc 10mg daily, losartan 50mg daily, and bumex 1mg daily. swithced to metoprolol this admission.  -continue 100mg bid metoprolol and amlodipine  -continue to hold losartan for jimmy    GERD:  Continue prilosec.    Gout:  Allopurinol on hold for JIMMY    DVT Prophylaxis: Pneumatic Compression Devices  Code Status: Full Code  FEN: renal diet  Discharge Dispo: home with family likely  Estimated Disch Date / # of Days until Disch: unclear, pending improvement in hypoxia.  Likely a few more days        Interval History (Subjective):      Patient continues to endorse significant sob with minimal movement to bathroom.  Has remained on 3-5L O2 via NC.  He denies chest pain.  Does have low back pain when up.  Has  "a dry cough mostly.  Is not having wheezing.  No fevers or chills.  Does feel like his heart is pounding.    Entire encounter complete with assistance of professional live .                  Physical Exam:      Last Vital Signs:  /67 (BP Location: Left arm)  Pulse 94  Temp 96.6  F (35.9  C) (Oral)  Resp 20  Ht 1.473 m (4' 10\")  Wt 85.1 kg (187 lb 9.6 oz)  SpO2 93%  BMI 39.21 kg/m2      Intake/Output Summary (Last 24 hours) at 04/17/17 1416  Last data filed at 04/17/17 1318   Gross per 24 hour   Intake             1040 ml   Output             2645 ml   Net            -1605 ml       Constitutional: Awake, appears in mild respiratory distress  Eyes: sclera white   HEENT: dry mucous membranes  Respiratory: mild distress.  Poor air movement.  No focal wheeze or crackles  Cardiovascular: RRR.  No murmur   GI: non-tender, not distended, bowel sounds present  Skin: no rash or lesions, acyanotic  Musculoskeletal/extremities: 1-2+ bilateral LE edema  Neurologic: A&O   Psychiatric: calm, cooperative          Medications:      All current medications were reviewed with changes reflected in problem list.         Data:      All new lab and imaging data was reviewed.   Labs:    Recent Labs  Lab 04/17/17  0617 04/16/17  1120 04/15/17  0600    144 144   POTASSIUM 4.8 5.4* 5.6*   CHLORIDE 111* 112* 113*   CO2 22 21 24   ANIONGAP 11 11 7   * 160* 120*   BUN 87* 83* 93*   CR 3.49* 3.09* 2.77*   GFRESTIMATED 17* 20* 22*   GFRESTBLACK 21* 24* 27*   RICHARD 9.2 8.7 8.8       Recent Labs  Lab 04/17/17  0617 04/15/17  0600 04/13/17  0723   WBC 14.6* 19.5* 15.6*   HGB 10.0* 9.4* 9.5*   HCT 31.0* 29.7* 30.6*   MCV 85 86 86   * 177 177      Lactate 1.0    Imaging:   None today      Jose D Golden MD        "

## 2017-04-17 NOTE — PLAN OF CARE
"Problem: Goal Outcome Summary  Goal: Goal Outcome Summary  SLP: Attempted swallow tx this AM; phone  utilized. Pt noted to be SOB and stated his \"heart was pounding.\" He declined interest in PO trials. SLP will re-attempt tomorrow to assess tolerance of diet and to educate pt on safe swallowing strategies.       "

## 2017-04-18 NOTE — PROGRESS NOTES
[facility]  Hospitalist Progress Note  Jose D Golden MD 04/18/17    Reason for Stay (Diagnosis): respiratory failure         Assessment and Plan:      Summary of Stay: Adina Rodriguez is a 81 year old male w/ hx of DM2, HTN, HLD, neuropathy, diastolic CHF, gout, and CKD4 admitted on 4/12/2017 with cough and sob.  Has had sob for the past month.  Also with 15-20 pound weight gain.  Persistent RLL infiltrate on CT which is possibly now fibrosis.  Has been on ceftriaxone/azithromycin then zosyn for possible recurrent aspiration pna.  No clear aspiration on SLP evaluation.  Does have elevated BNP and weight is up so has been receiving IV diuresis with good UOP, however now rising creatinine.  Nephrology consulted.  Given likely pulmonary edema, increasing IV diuresis with bumex today.    Problem List/Assessment and Plan:   Acute hypoxemic respiratory failure: persistent hypoxia requiring 3-5 L of supplemental O2 with very limited exertional capacity.  Likely multifactorial etiology.  Does have a RLL infiltrate concerning for infection, however has been present for nearly one month now.  Does have cough, but no fevers.  Has not significantly improve despite 6 days of antibiotics to cover for pneumonia.  Possibility of recurrent aspiration raised by previously pulmonary consultation.  Has had coughing once in a while after eating fast, but has been seen by SLP without clear aspiration event.  It is possible that this infiltrate on RLL has progressed to fibrosis and unclear how much of this is reversible.  He did smoke for nearly 40 years, but quite 30 years ago.  Also with evidence for volume overload and likely contribution from diastolic CHF in setting of 15-20 lb weight gain last month.  Unfortunately having difficulty with diuresis due to rising creatinine.  Considered PE given tachycardia, d-dimer 0.6 on admission.  Given crackles on exam CHF and volume overload seem more likely.  Will want to avoid  contrast given his rising creatinine, however could consider VQ scan if not improving with diuresis.  -continue abx today  -increase bumex to 1mg q 12 hrs  -likely repeat chest xray in morning  -suspect will need supplemental O2 on discharge, but would like to improve his exertional capacity prior to this    Possible aspiration pneumonia: as above persistent RLL infiltrate and hypoxia.  Possibility of aspiration, but none seen on swallow evaluation.  -continue zosyn for now (day 6-7 abx), may consider stopping tomorrow    Acute on chronic diastolic CHF: grade II diastolic dysfunction on TTE.  BNP elevated at 3055.  Weight up 15-20 pounds over one month pta.   Does have PND and orthopnea.  Troponin elevated initially, but no WMA on TTE and more likely due to tachycardia and CKD.  No chest pain.  Metoprolol has been started.  Weight is down 15 pounds, but appears to have some more to go with peripheral edema and crackles bilaterally suggestive of pulmonary edema.  -continue metoprolol 100mg bid for now  -holding losartan for rising creatinine  -increase bumex to 1mg q 12 hrs  -strict I/Os and daily weights  -monitor for urinary retention given pressure sensation with bladder scans.  If retaining plan to place redmond catheter    Sinus tachycardia:  Persistent HR in low 100's that is sinus.  Suspect related to his hypoxia and respiratory failure.  Considered alternative etiology such as PE (see above). Will continue to monitor for now.    JIMMY on CKD stage IV: creatinine baseline 2.5-2.7 likely.  3 days of rising creatinine with diuresis for acute diastolic CHF.  ARB is on hold.  Some mild hyperkalemia, better now.  -holding losartan  -nephrology consulted     Type II DM: A1c 7.2. Diet controlled.  BG here ok, almost all under 180.    HTN:  pta on norvasc 10mg daily, losartan 50mg daily, and bumex 1mg daily. swithced to metoprolol this admission.  -continue 100mg bid metoprolol and amlodipine  -continue to hold losartan  "for jimmy    GERD:  Continue prilosec.    Gout:  Allopurinol on hold for JIMMY    DVT Prophylaxis: Pneumatic Compression Devices  Code Status: Full Code  FEN: renal diet  Discharge Dispo: home with family likely  Estimated Disch Date / # of Days until Disch: unclear, pending improvement in hypoxia.  Likely a few more days        Interval History (Subjective):      Patient seen just after returning from bathroom.  He is very sob after short ambulation and it takes 15 minutes for him to catch his breath.  Also does endorse orthopnea.  No chest pain.  Does have some pressure when trying to urinate.    Entire encounter complete with assistance of professional live .                  Physical Exam:      Last Vital Signs:  /71 (BP Location: Left arm)  Pulse 115  Temp 98.1  F (36.7  C) (Axillary)  Resp 20  Ht 1.473 m (4' 10\")  Wt 85.8 kg (189 lb 1.6 oz)  SpO2 97%  BMI 39.52 kg/m2    Intake/Output Summary (Last 24 hours) at 04/18/17 1404  Last data filed at 04/18/17 1234   Gross per 24 hour   Intake              486 ml   Output             1400 ml   Net             -914 ml     Constitutional: Awake, appears in mild respiratory distress  Eyes: sclera white   HEENT: dry mucous membranes  Respiratory: tachypnea.  Bilateral crackles.  No wheeze  Cardiovascular: RRR.  No murmur   GI: non-tender, not distended, bowel sounds present  Skin: no rash or lesions, acyanotic  Musculoskeletal/extremities: 1-2+ bilateral LE edema  Neurologic: A&O   Psychiatric: calm, cooperative          Medications:      All current medications were reviewed with changes reflected in problem list.         Data:      All new lab and imaging data was reviewed.   Labs:    Recent Labs  Lab 04/18/17  0700 04/17/17  0617 04/16/17  1120    144 144   POTASSIUM 4.7 4.8 5.4*   CHLORIDE 111* 111* 112*   CO2 23 22 21   ANIONGAP 10 11 11   * 130* 160*   BUN 90* 87* 83*   CR 3.82* 3.49* 3.09*   GFRESTIMATED 15* 17* 20*   GFRESTBLACK " 18* 21* 24*   RICHARD 8.5 9.2 8.7       Recent Labs  Lab 04/18/17  0700 04/17/17  0617 04/15/17  0600   WBC 11.7* 14.6* 19.5*   HGB 8.4* 10.0* 9.4*   HCT 26.9* 31.0* 29.7*   MCV 85 85 86   * 148* 177      Imaging:   None today      Jose D Golden MD

## 2017-04-18 NOTE — PLAN OF CARE
Problem: Goal Outcome Summary  Goal: Goal Outcome Summary  Outcome: No Change  HR 1teens overnight, all other VSS.  LS dim w/fine crackles on 3-6L this shift.  Sats dipped into low 80's every few minutes, switched pt from NC to oxy mask and problem resolved.  FELIPE.  Tele: .  Speech and nephrology following.   scheduled from [id number] today.  Pt had some apneic episodes overnight, sticky note to MD.  D/C 3+ days pending improvement of hypoxia. Will continue to monitor.

## 2017-04-18 NOTE — DOWNTIME EVENT NOTE
The EMR was down for 8 hours on 4/17/17- 4/18/2017.    Ismael Pittman RN,  Noris Reyna LPN, Liz De La Torre, TINA,  Alyssa Flaherty LPN was responsible for completing the paper charting during this time period.     The following information was re-entered into the system by Alyssa Flaherty    The following information will remain in the paper chart: MAR, Physician Orders, Notes, Adult PCS, Input/Output    Alyssa Flaherty  4/18/2017

## 2017-04-18 NOTE — PROGRESS NOTES
"[facility]     Renal Progress Note       SHORTHAND KEY FOR MY NOTES:  c = with, s = without, p = after, a = before, x = except, asx = asymptomatic, tx = transplant or treatment, sx = symptoms or symptomatic, cx = canceled or culture, rxn = reaction, yday = yesterday, nl = normal, abx = antibiotics, fxn = function, dx = diagnosis, dz = disease, m/h = melena/hematochezia, c/d/l/ha = cramping/dizziness/lightheadedness/headache, d/c = discharge or diarrhea/constipation, f/c/n/v = fevers/chills/nausea/vomiting, cp/sob = chest pain/shortness of breath.         Assessment/Plan:     1.  JIMMY/CKD.  Pt's cr is a bit \"worse\" today, but is near the top of his previous baseline range.  He is still volume up and seems to still in heart failure clinically.  He has some prostatic sx, so will check PVR and potentially place a redmond.  He isn't eating well, but no other major uremic sx at this time.  No need for HD, yet.  A.  Follow labs, uo, sx.  B.  Avoid nephrotoxics.  C.  Increase bumet to 1 iv bid until he is breathing better.  D.  Will evaluate closely each day.  E.  Check PVR and place redmond prn.    2.  Anemia.  Hb is down a bit, but no signs of bleeding.  He is volume up still, though was net negative the last two days, so one would imagine that the hb would increase a bit from hemoconcentration.    A.  Follow hb, clinically.  B.  Check fe studies.    3.  HTN.  Pt's BP is ok on multiple meds.  No low BPs to suggest that he is getting too dry.  A.  Continue same meds/doses.    4.  FEN.  Na is stable at 144.  He is not eating much, but did eat some food from home yday.        Interval History:     Pt is feeling the same as yday, which isn't that great.  He is still having sob/orthopnea, but no cp.  No f/c/n/v.  He has \"pressure\" when he urinates and the urine appeared dark today.  He has prostatic sx and his abd is \"the same\" as yday as far as protuberance.  No abd/flank pain.      He ate some Cambodian food from " "home yday and it was ok.  Appetite is not great.  No d/l.          Medications and Allergies:       sodium chloride (PF)  3 mL Intracatheter Q8H     bumetanide  1 mg Intravenous Q12H     insulin aspart  1-7 Units Subcutaneous TID AC     insulin aspart  1-5 Units Subcutaneous At Bedtime     amLODIPine  10 mg Oral Daily     metoprolol  100 mg Oral BID     acetaminophen  975 mg Oral Q8H     piperacillin-tazobactam  2.25 g Intravenous Q6H     calcitRIOL  0.25 mcg Oral Daily     cholecalciferol  1,000 Units Oral Daily     aspirin EC  81 mg Oral Daily     fluticasone-vilanterol  1 puff Inhalation Daily     pravastatin  80 mg Oral At Bedtime     ipratropium - albuterol 0.5 mg/2.5 mg/3 mL  3 mL Nebulization 4x daily     polyethylene glycol  17 g Oral BID     Allergies   Allergen Reactions     Lisinopril      Hyperkalemia, elevated renal function          Physical Exam:     Vitals were reviewed    Heart Rate: 111, Blood pressure 135/71, pulse 115, temperature 98.1  F (36.7  C), temperature source Axillary, resp. rate 20, height 1.473 m (4' 10\"), weight 85.8 kg (189 lb 1.6 oz), SpO2 91 %.  Wt Readings from Last 3 Encounters:   04/18/17 85.8 kg (189 lb 1.6 oz)   01/12/17 83.4 kg (183 lb 12.8 oz)   12/07/16 85.3 kg (188 lb)     Intake/Output Summary (Last 24 hours) at 04/18/17 1442  Last data filed at 04/18/17 1234   Gross per 24 hour   Intake              486 ml   Output             1280 ml   Net             -794 ml     GENERAL APPEARANCE: pleasant, NAD, alert, lying in bed at an angle  HEENT:  Eyes/ears/nose/neck grossly normal  RESP: lungs c crackles, wheezes  CV: Reg, tachy, nl S1/S2   ABDOMEN: o/s/nt, + distended  EXTREMITIES/SKIN: tr ble edema         Data:     CBC RESULTS:     Recent Labs  Lab 04/18/17  0700 04/17/17  0617 04/15/17  0600 04/13/17  0723 04/12/17 2025   WBC 11.7* 14.6* 19.5* 15.6* 16.7*   RBC 3.16* 3.67* 3.46* 3.55* 3.62*   HGB 8.4* 10.0* 9.4* 9.5* 10.0*   HCT 26.9* 31.0* 29.7* 30.6* 31.1*   * 148* " 177 177 191     Basic Metabolic Panel:    Recent Labs  Lab 04/18/17  0700 04/17/17  0617 04/16/17  1120 04/15/17  0600 04/14/17  0612 04/13/17  0723    144 144 144 144 146*   POTASSIUM 4.7 4.8 5.4* 5.6* 5.4* 5.1   CHLORIDE 111* 111* 112* 113* 113* 114*   CO2 23 22 21 24 25 20   BUN 90* 87* 83* 93* 98* 88*   CR 3.82* 3.49* 3.09* 2.77* 2.67* 2.61*   * 130* 160* 120* 133* 183*   RICHARD 8.5 9.2 8.7 8.8 8.2* 8.4*     INRNo lab results found in last 7 days.   Attestation:   I have reviewed today's relevant vital signs, notes, medications, labs and imaging.    Camilo Maldonado MD  [facility] - Nephrology  [phone]

## 2017-04-18 NOTE — PLAN OF CARE
Problem: Goal Outcome Summary  Goal: Goal Outcome Summary  A/O.  Cambodian speaking. Tele: . LS: coarse, FELIPE. 91% on 2.5 LPM. On renal diet. Benoit placed today. Denies pain. Reviewed POC with  at bedside.

## 2017-04-18 NOTE — PLAN OF CARE
Problem: Goal Outcome Summary  Goal: Goal Outcome Summary  Outcome: Therapy, progress toward functional goals as expected  SLP: Pt seen for dysphagia f/u with  present. Pt with baseline productive cough. Pt tolerated thin liquids via straw and regular solid textures with no overt s/sx of aspiration. Pt with intermittent c/o globus sensation with and without PO trials; suspect d/t ongoing baseline coughing. Recommend continue regular textures and thin liquids. Pt should self-select softer textures, sit fully upright for all PO, take small single sips/bites, pace self, and alternate consistencies. Goals met. ST to sign off.      Speech Language Therapy Discharge Summary     Reason for therapy discharge:    All goals and outcomes met, no further needs identified.     Progress towards therapy goal(s). See goals on Care Plan in [facility] electronic health record for goal details.  Goals met     Therapy recommendation(s):    No further therapy is recommended.

## 2017-04-19 NOTE — PLAN OF CARE
Problem: Goal Outcome Summary  Goal: Goal Outcome Summary  Outcome: No Change  A & O. Tele ST. 94% on 15L oxymask. Scheduled nebs, PRN nebs, tessalon. Pt c/o feeling congested. ID consulted today. Labs ordered. IV bumex continued with nyla. Nephrology following.

## 2017-04-19 NOTE — PLAN OF CARE
Problem: Goal Outcome Summary  Goal: Goal Outcome Summary  Outcome: No Change  HR's 100-120, RR low 20's, afebrile, bp wdl.  Tele: ST.  Alert, Cambodian speaking so unsure of orientation, SBA.  FELIPE, sat's drop to low 80's with movement.  LS dim in bases, coarse crackles throughout.  PRN neb x1, PRN oxy x1 for dyspnea, with good results.  NC with humidity on 3L and satting in the low 90's.  Pt had mild bloody nose at 0005, then blood in his stool shortly after.  Pt coughed up some blood overnight, possible residual from nose bleed.  Pt receiving IV Zosyn.  Plan for d/c > 3 days.  Will continue to monitor.

## 2017-04-19 NOTE — CONSULTS
IMPRESSION:   1.  An 81-year-old [place] immigrant male with subacute worsening respiratory symptoms, possible new right lower lobe infiltrate, overall doubt this is conventional infection.  If infection, concern for more unusual or opportunistic infection.   2.  Pulmonary fibrosis, recent diagnosis, some interstitial changes, question chronic infection versus other cause, no pathology, no high-dose steroids.   3.  Diabetes mellitus.   4.  Congestive heart failure.   5.  Acute on chronic renal insufficiency.      RECOMMENDATIONS:   1.  Continue Zosyn and add Zithromax for atypical coverage.  It is certainly possible this is some conventional infection like aspiration but with the underlying interstitial disease and subacute symptoms would be concerned if it is infection or something more chronic.  Not having major fever or other sepsis symptoms.   2.  Continue conventional antibiotics if not improving.  No question bronchoscopy, etc.      HISTORY:  This 81-year-old male seen consultation.  The patient is a [place] immigrant, came to the [place]+ years ago.  No history of known tuberculosis, although had a relative who he has seen years ago who had active tuberculosis and he was exposed.  He had a negative PPD at one point and has a negative QuantiFERON TB gold currently.  The patient has not been back to [facility] recently and has had no exposure to anyone else who has been ill.  No animal exposure or recent travel.  He has evolved to have respiratory symptoms over the last several months and been diagnosed with fibrosis as an outpatient.  He now comes in with somewhat worsening hypoxia and respiratory symptoms, found to have right lower lobe infiltrate.  Does not have a history of aspiration and again no exposures of note.  He has been in the hospital several days on Zosyn without particular improvement, not really having fevers or chills.  No productive sputum.  He was also noted to have acute renal  insufficiency, possibly some fluid accumulation related to that.      PAST MEDICAL HISTORY:  Diabetes, modestly well controlled, acute on chronic renal insufficiency, apparently baseline creatinine in the 2s, currently in the 3s.      SOCIAL AND FAMILY HISTORY:  [place] immigrant, prior PPD negative, QuantiFERON TB Gold negative, no particular exposures otherwise recently.  No active alcohol or tobacco use.      REVIEW OF SYSTEMS:  Otherwise unremarkable.  No rashes, skin lesions, no abdominal pain, diarrhea, nausea, vomiting.  Has had maybe 10-15 pound weight loss over the last few months.        MEDICATIONS:  Medication list as noted and reviewed, does not appear to have given steroids despite possible diagnosis of fibrosis.      PHYSICAL EXAMINATION:   GENERAL:  Appears stated age, does not look particularly toxic or ill.   VITAL SIGNS:  Currently within normal limits.   HEENT:  No thrush or oropharyngeal lesions.  Pupils reactive.   NECK:  Supple and nontender, no lymphadenopathy or thyromegaly.   HEART:  Regular rhythm, slight tachycardia at 100.   LUNGS:  Crackles at both bases, a bit more in the right, okay air movement, requiring oxygen for maintaining O2.   ABDOMEN:  Soft and nontender, no hepatosplenomegaly.   EXTREMITIES:  No rash or skin lesions.      LABORATORY DATA:  No positive cultures.  Lactic acid 0.8.  Creatinine 3.88.  CT chest noted some progressive interstitial ground-glass air space abnormalities, slight right lower lobe infiltrate, some lymphadenopathy.         MIKAYLA RODRIGUEZ MD             D: 2017 17:09   T: 2017 17:31   MT: [id number]      Name:     AIDAN DORAN   MRN:      [id number]        Account:       [id number]   :      1935           Consult Date:  2017      Document: [id number]

## 2017-04-19 NOTE — PROGRESS NOTES
"CLINICAL NUTRITION SERVICES  -  ASSESSMENT NOTE     REASON FOR ASSESSMENT  Adina Rodriguez is a 81 year old male seen by Registered Dietitian for LOS      NUTRITION HISTORY  - Unable to obtain nutrition history d/t pt unavailable x 2, no  x 1  - Hx of DM2, HTN, HLD, neuropathy, diastolic CHF, gout, and CKD4 admitted on 4/12/2017 with cough and sob.  - Per chart review: Had initial DM education with ALEXI WOLF on 11/22/05 but was hesitant at that time to accept the diagnosis.    CURRENT NUTRITION ORDERS  Diet Order:     Renal    Current Intake/Tolerance:   Per nsg, pt consuming % of meals with good tolerance. Nsg notes that pt's family is bringing in food from home.    PHYSICAL FINDINGS  Observed  Unable to assess d/t pt unavailable  Obtained from Chart/Interdisciplinary Team  Obese    ANTHROPOMETRICS  Height: 4' 10\"   Weight: 85 kg   Body mass index is 39.44 kg/(m^2).  Weight Status:  Obesity Grade II BMI 35-39.9  IBW: 45.2 kg  % IBW: 188%  Weight History: Weight appears stable over the last year.  Vitals:    04/17/17 0533 04/18/17 0623 04/19/17 0539   Weight: 85.1 kg (187 lb 9.6 oz) 85.8 kg (189 lb 1.6 oz) 85.6 kg (188 lb 11.2 oz)     Wt Readings from Last 5 Encounters:   04/19/17 85.6 kg (188 lb 11.2 oz)   01/12/17 83.4 kg (183 lb 12.8 oz)   12/07/16 85.3 kg (188 lb)   08/30/16 83.9 kg (185 lb)   02/23/16 84.4 kg (186 lb)       LABS  Labs reviewed  - Na 145 (H)   - Hemoglobin A1c 7.2 (H) -- historically between 6 and 7.2.    MEDICATIONS  Medications reviewed    Dosing Weight  55 kg (adjBW based on lowest wt this admit of 85.1 kg on 4/17)    ASSESSED NUTRITION NEEDS (PER APPROVED PRACTICE GUIDELINES):  Estimated Energy Needs: 4349-7255 kcals (25-30 Kcal/Kg)  Justification: maintenance  Estimated Protein Needs: 44-66 grams protein (0.8-1.2 g pro/Kg)   Justification: Renal  Estimated Fluid Needs: 1 mL/Kcal  Justification: maintenance or per provider pending fluid status    MALNUTRITION:  % Weight Loss:  None " noted  % Intake:  No decreased intake noted  Subcutaneous Fat Loss:  Unable to assess  Muscle Loss:  Unable to assess  Fluid Retention:  None noted    Malnutrition Diagnosis: Unable to determine due to pt unavailable x multiple attempts today    NUTRITION DIAGNOSIS:  No nutrition diagnosis identified at this time       NUTRITION INTERVENTIONS  Recommendations / Nutrition Prescription  Continue renal diet    Implementation  Nutrition education: Per Provider order if indicated   Collaboration and Referral of Nutrition care -- discussed pt on interdisciplinary rounds    Nutrition Goals  Patient to continue to consume % of nutritionally adequate meal trays TID, or the equivalent with supplements/snacks.    MONITORING AND EVALUATION:  Will follow up if remains inpatient per guidelines    -----------------------------  Lizeth Burnett  Dietetic Intern  [facility]  pager: [phone]

## 2017-04-19 NOTE — PROGRESS NOTES
[facility]  Hospitalist Progress Note  Jose D Golden MD 04/19/17    Reason for Stay (Diagnosis): respiratory failure         Assessment and Plan:      Summary of Stay:  Adina Rodriguez is a 81 year old male w/ hx of DM2, HTN, HLD, neuropathy, diastolic CHF, gout, and CKD4 admitted on 4/12/2017 with cough and sob.  Has had sob for the past month.  Also with 15-20 pound weight gain.  Persistent RLL infiltrate on CT which is possibly now fibrosis.  Has been on ceftriaxone/azithromycin then zosyn for possible recurrent aspiration pna.  No clear aspiration on SLP evaluation.  Does have elevated BNP and weight is up so has been receiving IV diuresis with good UOP, however now rising creatinine.  Nephrology consulted.  Have continued with diuresis for peripheral edema and possibility of pulmonary edema, but despite weight coming down he is not improving from a respiratory standpoint.  A repeat CT is showing bilateral groundglass opacities and infiltrates, some of which looks like fibrosis.  Have asked ID to assist with antibiotic management and if further evaluation for infectious etiology indicated.      Problem List/Assessment and Plan:   Acute hypoxemic respiratory failure: persistent hypoxia requiring 3-5 L of supplemental O2 with very limited exertional capacity.  Continues to have desaturation to 70's with exertion.  Likely multifactorial etiology.  Does have a RLL infiltrate concerning for infection, however has been present for nearly one month now.  Does have cough, but no fevers.  Has not significantly improve despite 7 days of antibiotics to cover for pneumonia.  Possibility of recurrent aspiration raised by previously pulmonary consultation.  Has had coughing once in a while after eating fast, but has been seen by SLP without clear aspiration event.  It is possible that this infiltrate on RLL has progressed to fibrosis and unclear how much of this is reversible.  He did smoke for nearly 40 years, but  quite 30 years ago.  Also with evidence for volume overload and likely contribution from diastolic CHF in setting of 15-20 lb weight gain last month, however symptoms not improved despite being 12 pounds down from admission.  Considered PE given tachycardia, d-dimer 0.6 on admission.  His repeat CT chest shows bilateral ground glass opacities and infiltrates of unclear etiology.  Question possibility of infection not covered by zosyn vs pulmonary edema although does not have this appearance vs progression of fibrosis.  -continue zosyn for now  -continuing diuresis this morning, nephrology to see  -consult ID for possible infectious etiology, abx, and further work up  -check procalcitonin  -check sputum culture  -will likely discuss with pulmonology possibility of interstitial fibrosis or alternative primary lung etiology    Possible aspiration pneumonia:  Possibility of aspiration, but none seen on swallow evaluation.  Bilateral infiltrates on repeat CT, however not clear this represents infection.  -continue zosyn for now (day 6-7 abx)     Acute on chronic diastolic CHF: grade II diastolic dysfunction on TTE.  BNP elevated at 3055.  Weight up 15-20 pounds over one month pta.   Does have PND and orthopnea.  Troponin elevated initially, but no WMA on TTE and more likely due to tachycardia and CKD.  No chest pain.  Metoprolol has been started.  Weight is down 15 pounds since admission.  Unclear how much more fluid can be removed.  -continue metoprolol 100mg bid for now  -holding losartan for rising creatinine  -continue bumex for now, nephrology to see  -strict I/Os and daily weights  -continue redmond    Sinus tachycardia:  Persistent HR in low 100's that is sinus.  Suspect related to his hypoxia and respiratory failure.  Considered alternative etiology such as PE (see above). Will continue to monitor for now.    JIMMY on CKD stage IV: creatinine baseline 2.5-2.7 likely.  3 days of rising creatinine with diuresis for  "acute diastolic CHF.  ARB is on hold.  Some mild hyperkalemia, better now.  -holding losartan  -nephrology consulted     Type II DM: A1c 7.2. Diet controlled.  BG here ok, almost all under 180.    HTN:  pta on norvasc 10mg daily, losartan 50mg daily, and bumex 1mg daily. swithced to metoprolol this admission.  -continue 100mg bid metoprolol and amlodipine  -continue to hold losartan for jimmy    GERD:  Continue prilosec.    Gout:  Allopurinol on hold for JIMMY    DVT Prophylaxis: Pneumatic Compression Devices  Code Status: Full Code  FEN: renal diet  Discharge Dispo: home with family likely  Estimated Disch Date / # of Days until Disch: unclear, pending improvement in hypoxia.  Likely a few more days        Interval History (Subjective):      Good UOP with lasix and weight down.  Still feels very sob with minimal ambulation and O2 sats dropping into 70s.  Denies chest pain.  Some cough.  Difficulty catching his breath.  Has not noticed wheezing.  Eating ok.  Feels fatigued.    Entire encounter complete with assistance of professional live .                  Physical Exam:      Last Vital Signs:  /61 (BP Location: Left arm)  Pulse 115  Temp 97  F (36.1  C) (Oral)  Resp 24  Ht 1.473 m (4' 10\")  Wt 85.6 kg (188 lb 11.2 oz)  SpO2 93%  BMI 39.44 kg/m2    Intake/Output Summary (Last 24 hours) at 04/19/17 1341  Last data filed at 04/19/17 0538   Gross per 24 hour   Intake              465 ml   Output             1600 ml   Net            -1135 ml     Constitutional: Awake, appears in mild respiratory distress  Eyes: sclera white   HEENT: dry mucous membranes  Respiratory: tachypnea.  Crackles L side > R side  Cardiovascular: RRR.  No murmur   GI: non-tender, not distended, bowel sounds present  Skin: no rash or lesions, acyanotic  Musculoskeletal/extremities: 1-2+ bilateral LE edema  Neurologic: A&O   Psychiatric: calm, cooperative          Medications:      All current medications were reviewed with " changes reflected in problem list.         Data:      All new lab and imaging data was reviewed.   Labs:    Recent Labs  Lab 04/19/17  0640 04/18/17  0700 04/17/17  0617   * 144 144   POTASSIUM 4.7 4.7 4.8   CHLORIDE 111* 111* 111*   CO2 24 23 22   ANIONGAP 10 10 11   * 116* 130*   BUN 86* 90* 87*   CR 3.88* 3.82* 3.49*   GFRESTIMATED 15* 15* 17*   GFRESTBLACK 18* 18* 21*   RICHARD 8.6 8.5 9.2       Recent Labs  Lab 04/19/17  0640 04/18/17  0700 04/17/17  0617   WBC 12.5* 11.7* 14.6*   HGB 8.1* 8.4* 10.0*   HCT 25.7* 26.9* 31.0*   MCV 85 85 85    147* 148*      Imaging:   Recent Results (from the past 24 hour(s))   CT Chest w/o Contrast    Narrative    CT CHEST WITHOUT CONTRAST   4/19/2017 10:40 AM    HISTORY: Hypoxia.    TECHNIQUE: Volumetric helical acquisition was performed from the  thoracic inlet through the upper abdomen without IV contrast. Coronal  images were also reconstructed from the axial data. Radiation dose for  this scan was reduced using automated exposure control, adjustment of  the mA and/or kV according to patient size, or iterative  reconstruction technique.    COMPARISON: Chest CT performed 3/14/2017.    FINDINGS: Patchy mixed interstitial, groundglass, and airspace  opacities in both lungs have increased significantly in prominence  compared to 3/14/2017. Mild bronchiectasis at both lung bases has also  increased in prominence. No definite honeycombing is identified. No  pleural or pericardial effusions. Scattered mildly prominent and  borderline-enlarged mediastinal lymph nodes are not significantly  changed. No other enlarged lymph nodes are identified in the chest.  Indeterminate right thyroid nodule measures 1.5 cm, and is unchanged.  Atherosclerotic calcification of the thoracic aorta and coronary  arteries. Small hiatal hernia. The gallbladder is mildly distended. 2  cm cyst in the medial segment of the left hepatic lobe is unchanged.  Limited noncontrast views of the  upper abdomen are otherwise  unremarkable. Hypertrophic changes are noted throughout the thoracic  spine.      Impression    IMPRESSION:   1. Patchy mixed groundglass, interstitial, and airspace opacities in  both lungs have increased significantly in prominence since 3/14/2017.  Mild bronchiectasis bilaterally has also increased in prominence.  Findings are nonspecific, but may be infectious or inflammatory in  etiology. Pulmonary edema is possible, but considered less likely from  this appearance.  2. Scattered mildly prominent and borderline-enlarged mediastinal  lymph nodes are not significantly changed.             Jose D Golden MD

## 2017-04-19 NOTE — PLAN OF CARE
Problem: Goal Outcome Summary  Goal: Goal Outcome Summary  Outcome: No Change  A & O. VSS. Difficult to get good O2 reading. 92-94% on 3L NC. Nebs by RTLulu HUTTON On [facility] for PNA. Nephrology following d.t elevated creat. Benoit to measure strict I & O.

## 2017-04-19 NOTE — PLAN OF CARE
Problem: Goal Outcome Summary  Goal: Goal Outcome Summary  Outcome: Improving  VSS. O2 sats at 75% on 5 L of O2. Now needing 10 L of O2 oxymask to maintain sats >90%.Complains of dyspnea at rest. Fine crackles to bilateral lungs. Denies chest pain. Tele ST/SR. Had repeat chest X-ray showing increased opacities. Receiving IV Bumex. Benoit remains in place for strict I&O's. Remains on IV Zosyn fr possible pneumonia. Ambulates with standby assist. Blood sugars are 130 and 164.

## 2017-04-19 NOTE — CONSULTS
ID consult dictated IMP 1 80 yo ? ILD/fibrosis no biopsy, no steroids, now ? RLL infiltrate, hypoxia, worsening, if infection doubt conventional  REc serologies, zosyn , add zithromax, if not better ? bronch

## 2017-04-19 NOTE — PROGRESS NOTES
[facility]     Renal Progress Note       SHORTHAND KEY FOR MY NOTES:  c = with, s = without, p = after, a = before, x = except, asx = asymptomatic, tx = transplant or treatment, sx = symptoms or symptomatic, cx = canceled or culture, rxn = reaction, yday = yesterday, nl = normal, abx = antibiotics, fxn = function, dx = diagnosis, dz = disease, m/h = melena/hematochezia, c/d/l/ha = cramping/dizziness/lightheadedness/headache, d/c = discharge or diarrhea/constipation, f/c/n/v = fevers/chills/nausea/vomiting, cp/sob = chest pain/shortness of breath.         Assessment/Plan:     1.  JIMMY/CKD.  Pt's cr is stable overnight.  His volume status seems to be ok right now c the increased diuretics.  He does not seem to have any major uremic sx.  UO is good via redmond.  Chemistries are fine.     A.  Follow labs, uo, sx.  B.  Avoid nephrotoxics.  C.  Continue bumet 1 iv bid for at least another day.  If the BUN/Cr start to rise further, or there are signs of contraction, will decrease.  D.  Continue redmond.    2.  Hypoxia.  Pt's sats are low c activity.  He is now on FM o2.  His chest CT doesn't look good and he will benefit from a Pulmonary consult.  He has seen someone as an outpt.  He has a h/o IPF, but his crackles are better today.  He is on Zosyn, wbc is lower, but he still has intermittent fevers.  Sputum cx is pending.  A.  Follow clinically.  B.  Continue supp o2.  C.  Continue abx.  Agree c sputum cx.  D.  Pulm consult.    3.  Anemia.  Hb is down a bit, despite net negative fluid balance.  He had a bloody nose yday, and overnight coughed up some blood and had blood in his stool.    A.  Follow hb, clinically.  B.  Check fe studies.    4.  HTN.  Pt's BP is ok on multiple meds.  No low BPs to suggest that he is getting too dry.  A.  Continue same meds/doses.  B.  Follow clinically since he is prob approaching a drier state.    5.  FEN.  Na is a little higher at 145 from free water loss related to loop  "diuretics.  He is eating a bit.  A.  Follow electrolytes.          Interval History:     No  present today.      He looks better than yday, but his breathing is worse and he is now on FM o2.  He desatted to 75% on 5L.  Pt is still taking aceta on a scheduled basis due to intermittent fevers.  Good uo noted via redmond.  He ate lunch today.    Per notes, he had some hemoptysis and blood in his stools.  This was p a nosebleed.         Medications and Allergies:       piperacillin-tazobactam  2.25 g Intravenous Q8H     sodium chloride (PF)  3 mL Intracatheter Q8H     bumetanide  1 mg Intravenous Q12H     insulin aspart  1-7 Units Subcutaneous TID AC     insulin aspart  1-5 Units Subcutaneous At Bedtime     amLODIPine  10 mg Oral Daily     metoprolol  100 mg Oral BID     acetaminophen  975 mg Oral Q8H     calcitRIOL  0.25 mcg Oral Daily     cholecalciferol  1,000 Units Oral Daily     fluticasone-vilanterol  1 puff Inhalation Daily     pravastatin  80 mg Oral At Bedtime     ipratropium - albuterol 0.5 mg/2.5 mg/3 mL  3 mL Nebulization 4x daily     polyethylene glycol  17 g Oral BID     Allergies   Allergen Reactions     Lisinopril      Hyperkalemia, elevated renal function          Physical Exam:     Vitals were reviewed    Heart Rate: 112, Blood pressure 126/61, pulse 115, temperature 97  F (36.1  C), temperature source Oral, resp. rate 24, height 1.473 m (4' 10\"), weight 85.6 kg (188 lb 11.2 oz), SpO2 93 %.  Wt Readings from Last 3 Encounters:   04/19/17 85.6 kg (188 lb 11.2 oz)   01/12/17 83.4 kg (183 lb 12.8 oz)   12/07/16 85.3 kg (188 lb)     Intake/Output Summary (Last 24 hours) at 04/19/17 1415  Last data filed at 04/19/17 1404   Gross per 24 hour   Intake              465 ml   Output             2300 ml   Net            -1835 ml     GENERAL APPEARANCE: pleasant, NAD, alert, lying in bed at an angle  HEENT:  Eyes/ears/nose/neck grossly normal  RESP: lungs c crackles at bases, but better than yday; + FM " o2  CV: Reg, tachy, nl S1/S2   ABDOMEN: o/s/nt/nd  EXTREMITIES/SKIN: no significant ble edema         Data:     CBC RESULTS:    Recent Labs  Lab 04/19/17  0640 04/18/17  0700 04/17/17  0617 04/15/17  0600 04/13/17  0723 04/12/17  2025   WBC 12.5* 11.7* 14.6* 19.5* 15.6* 16.7*   RBC 3.02* 3.16* 3.67* 3.46* 3.55* 3.62*   HGB 8.1* 8.4* 10.0* 9.4* 9.5* 10.0*   HCT 25.7* 26.9* 31.0* 29.7* 30.6* 31.1*    147* 148* 177 177 191     Basic Metabolic Panel:    Recent Labs  Lab 04/19/17  0640 04/18/17  0700 04/17/17  0617 04/16/17  1120 04/15/17  0600 04/14/17  0612   * 144 144 144 144 144   POTASSIUM 4.7 4.7 4.8 5.4* 5.6* 5.4*   CHLORIDE 111* 111* 111* 112* 113* 113*   CO2 24 23 22 21 24 25   BUN 86* 90* 87* 83* 93* 98*   CR 3.88* 3.82* 3.49* 3.09* 2.77* 2.67*   * 116* 130* 160* 120* 133*   RICHARD 8.6 8.5 9.2 8.7 8.8 8.2*     INR    Recent Labs  Lab 04/19/17  0855   INR 1.26*      Attestation:   I have reviewed today's relevant vital signs, notes, medications, labs and imaging.    Camilo Maldonado MD  [facility] - Nephrology  [phone]

## 2017-04-19 NOTE — PROGRESS NOTES
"[facility]     Date: 4/19/2017  Admission Dx: Pneumonia  Pulmonary History: Pulmonary fibrosis  Home Nebulizer/MDI Use: Breo  Home Oxygen: room air  Acuity Level (RCAT flow sheet): 3  Aerosol Therapy initiated: Continue Duoneb QID and Breo QD      Pulmonary Hygiene initiated: Deep breath and cough TID      Volume Expansion initiated: IS TID      Current Oxygen Requirements: 3 Lpm NC  Current SpO2: 92%    Re-evaluation date: 4/22/2017    Patient Education: Pt doesn't speak english and no one was available to translate.      See \"RT Assessments\" flow sheet for patient assessment scoring and Acuity Level Details.       Paulie Clay  April 19, 2017.8:01 AM            "

## 2017-04-20 NOTE — CONSULTS
[facility] Pulmonary Medicine Care Consultation    Date of Admission:  4/12/2017  Date of Consult (When I saw the patient): 04/20/17     Assessment & Plan   Adina Rodriguez is a 81 year old male who was admitted on 4/12/2017. RLL infiltrate on admission CXR, no helpful micro available. CT on 4/19 shows scattered patchy ground glass opacity.     1. Acute hypoxic respiratory failure - seems like he came in with more volume overload picture then developed acute ?infectious process. Looking at older imaging, chronic process like organizing pneumonia seems a little less likely but possible.    Fever improved on empiric therapy without cultures to guide   Possible bronch tomorrow 10:30, patient to think about it   NPO after midnight    2. Chronic lung disease, looks like nonspecific fibrosis (not necessarily IPF) - he has bronchiectasis, on Breo. Nonspecific pattern moderate airflow limitation on PFTs. Thought to be possibly related to aspiration or GERD. Swallow eval here normal, could be GERD.   - rec starting 3% saline nebs and flutter valve (ordered)    I discussed risks and benefits (worsening respiratory status/not getting helpful info vs finding something that will     Resp: 24    Tereza Santoyo MD      Reason for Consult   Reason for consult: I was asked by Dr Golden to evaluate this patient for hypoxemia, possible bronchoscopy.    History of Present Illness   Adina Rodriguez is a 81 year old male who presents with shortness of breath, dry cough and weight gain on 4/12. Usually sees [facility], found to have nonspecific imaging and PFTs in the past, some bronchiectasis. He takes Breo, which helped his cough but made throat and gums dry. Tmax 100.1 48 hours ago. Denies other complaints besides fatigue, shortness of breath, cough    Physical Exam   Temp: 98.3  F (36.8  C) Temp src: Oral Temp  Min: 98.3  F (36.8  C)  Max: 98.9  F (37.2  C) BP: 111/54 Pulse: 92 Heart Rate: 112  Resp: 24 SpO2: 93 % O2 Device: Oxymask Oxygen Delivery: 7 LPM  Vitals:    04/18/17 0623 04/19/17 0539 04/20/17 0703   Weight: 85.8 kg (189 lb 1.6 oz) 85.6 kg (188 lb 11.2 oz) 83.1 kg (183 lb 3.2 oz)     I/O last 3 completed shifts:  In: 966 [P.O.:960; I.V.:6]  Out: 1050 [Urine:1050]    Awake, alert appropriate and following commands  PERRL and conjugate gaze  Lungs clear  H-RR w/o murmur  Abdomen-soft, non tender, non distended  Extremities-warm and no edema  Central line insertion site without inflammation    Medications        azithromycin  500 mg Oral Daily     pantoprazole  40 mg Oral QAM     piperacillin-tazobactam  2.25 g Intravenous Q8H     sodium chloride (PF)  3 mL Intracatheter Q8H     insulin aspart  1-7 Units Subcutaneous TID AC     insulin aspart  1-5 Units Subcutaneous At Bedtime     amLODIPine  10 mg Oral Daily     metoprolol  100 mg Oral BID     acetaminophen  975 mg Oral Q8H     calcitRIOL  0.25 mcg Oral Daily     cholecalciferol  1,000 Units Oral Daily     fluticasone-vilanterol  1 puff Inhalation Daily     pravastatin  80 mg Oral At Bedtime     ipratropium - albuterol 0.5 mg/2.5 mg/3 mL  3 mL Nebulization 4x daily     polyethylene glycol  17 g Oral BID       Data     Recent Labs  Lab 04/20/17  0644 04/19/17  0855 04/19/17  0640 04/18/17  0700  04/15/17  0953 04/15/17  0600 04/15/17  0200   WBC 11.3*  --  12.5* 11.7*  < >  --  19.5*  --    HGB 7.8*  --  8.1* 8.4*  < >  --  9.4*  --    MCV 87  --  85 85  < >  --  86  --      --  187 147*  < >  --  177  --    INR  --  1.26*  --   --   --   --   --   --    *  --  145* 144  < >  --  144  --    POTASSIUM 4.5  --  4.7 4.7  < >  --  5.6*  --    CHLORIDE 112*  --  111* 111*  < >  --  113*  --    CO2 25  --  24 23  < >  --  24  --    BUN 86*  --  86* 90*  < >  --  93*  --    CR 4.18*  --  3.88* 3.82*  < >  --  2.77*  --    ANIONGAP 9  --  10 10  < >  --  7  --    RICHARD 8.4*  --  8.6 8.5  < >  --  8.8  --    *  --  130* 116*  < >  --  120*   --    TROPI  --   --   --   --   --  0.244* 0.250* 0.209*   < > = values in this interval not displayed.  Recent Results (from the past 24 hour(s))   CT Chest w/o Contrast    Narrative    CT CHEST WITHOUT CONTRAST   4/19/2017 10:40 AM    HISTORY: Hypoxia.    TECHNIQUE: Volumetric helical acquisition was performed from the  thoracic inlet through the upper abdomen without IV contrast. Coronal  images were also reconstructed from the axial data. Radiation dose for  this scan was reduced using automated exposure control, adjustment of  the mA and/or kV according to patient size, or iterative  reconstruction technique.    COMPARISON: Chest CT performed 3/14/2017.    FINDINGS: Patchy mixed interstitial, groundglass, and airspace  opacities in both lungs have increased significantly in prominence  compared to 3/14/2017. Mild bronchiectasis at both lung bases has also  increased in prominence. No definite honeycombing is identified. No  pleural or pericardial effusions. Scattered mildly prominent and  borderline-enlarged mediastinal lymph nodes are not significantly  changed. No other enlarged lymph nodes are identified in the chest.  Indeterminate right thyroid nodule measures 1.5 cm, and is unchanged.  Atherosclerotic calcification of the thoracic aorta and coronary  arteries. Small hiatal hernia. The gallbladder is mildly distended. 2  cm cyst in the medial segment of the left hepatic lobe is unchanged.  Limited noncontrast views of the upper abdomen are otherwise  unremarkable. Hypertrophic changes are noted throughout the thoracic  spine.      Impression    IMPRESSION:   1. Patchy mixed groundglass, interstitial, and airspace opacities in  both lungs have increased significantly in prominence since 3/14/2017.  Mild bronchiectasis at both lung bases has also increased in  prominence. Findings are nonspecific, but may be infectious or  inflammatory in etiology. Pulmonary edema is possible, but considered  less likely  from this appearance.  2. Scattered mildly prominent and borderline-enlarged mediastinal  lymph nodes are not significantly changed.    VIKAS CARABALLO MD

## 2017-04-20 NOTE — PROGRESS NOTES
[facility]     Renal Progress Note       SHORTHAND KEY FOR MY NOTES:  c = with, s = without, p = after, a = before, x = except, asx = asymptomatic, tx = transplant or treatment, sx = symptoms or symptomatic, cx = canceled or culture, rxn = reaction, yday = yesterday, nl = normal, abx = antibiotics, fxn = function, dx = diagnosis, dz = disease, m/h = melena/hematochezia, c/d/l/ha = cramping/dizziness/lightheadedness/headache, d/c = discharge or diarrhea/constipation, f/c/n/v = fevers/chills/nausea/vomiting, cp/sob = chest pain/shortness of breath.         Assessment/Plan:     1.  JIMMY/CKD.  Pt's cr is up, likely from diuresis.  He seems euvolemic at present, so holding the diuretics for now is a good idea.  Urinating ok.  Chemistries are fine.     A.  Follow labs, uo, sx.  B.  Avoid nephrotoxics.  C.  Hold bumet for a day and then consider resuming qd dosing schedule.    D.  Continue redmond for now to monitor I/Os.    2.  Hypoxia.  Pt was seen by Pulm today.  Due for bronch tmrw.  Clinically, he appears stable.  Volume is potentially contributing, but less so than upon admission.  His lung exam is stable vs yday.  Seen by ID and some tests pending.  Also, on broadened abx.  A.  Follow clinically.  B.  Continue supp o2.  C.  Continue abx at proper renal doses.  D.  Await bronch results.      3.  Anemia.  Hb is down further.  Doesn't seem dilutional.  Some blood in stools.  Fe studies didn't get done yday.  A.  Follow hb, clinically.  B.  Check fe studies.    4.  HTN.  Pt's BP is good.  His BP has been trending down daily c diuresis, so prob another suggestion of overdiuresis.   A.  Continue same meds/doses of BP meds x hold diuretics.    5.  FEN.  Na is 146 today from increased free water loss.  Now that the second dose of bumet is being held today, will monitor.  He is drinking water prn.  A.  Follow labs.           Interval History:     No major issues reported today.  He is waiting for a neb.  Breathing  "ok.  He ate this am s probs. Denies dizziness.         Medications and Allergies:       azithromycin  500 mg Oral Daily     pantoprazole  40 mg Oral BID AC     nystatin  500,000 Units Swish & Spit 4x Daily     piperacillin-tazobactam  2.25 g Intravenous Q8H     sodium chloride (PF)  3 mL Intracatheter Q8H     insulin aspart  1-7 Units Subcutaneous TID AC     insulin aspart  1-5 Units Subcutaneous At Bedtime     amLODIPine  10 mg Oral Daily     metoprolol  100 mg Oral BID     calcitRIOL  0.25 mcg Oral Daily     cholecalciferol  1,000 Units Oral Daily     fluticasone-vilanterol  1 puff Inhalation Daily     pravastatin  80 mg Oral At Bedtime     ipratropium - albuterol 0.5 mg/2.5 mg/3 mL  3 mL Nebulization 4x daily     polyethylene glycol  17 g Oral BID     Allergies   Allergen Reactions     Lisinopril      Hyperkalemia, elevated renal function          Physical Exam:     Vitals were reviewed    Heart Rate: 114, Blood pressure 114/60, pulse 92, temperature 98.3  F (36.8  C), temperature source Oral, resp. rate 24, height 1.473 m (4' 10\"), weight 83.1 kg (183 lb 3.2 oz), SpO2 96 %.  Wt Readings from Last 3 Encounters:   04/20/17 83.1 kg (183 lb 3.2 oz)   01/12/17 83.4 kg (183 lb 12.8 oz)   12/07/16 85.3 kg (188 lb)     Intake/Output Summary (Last 24 hours) at 04/20/17 1425  Last data filed at 04/20/17 1300   Gross per 24 hour   Intake             1206 ml   Output             1250 ml   Net              -44 ml     GENERAL APPEARANCE: pleasant, NAD, alert, lying in bed  HEENT:  Eyes/ears/nose/neck grossly normal  RESP: lungs c insp crackles at bases; + FM o2  CV: irreg/irreg, tachy, nl S1/S2   ABDOMEN: o/s/nt/nd  EXTREMITIES/SKIN: no significant ble edema         Data:     CBC RESULTS:    Recent Labs  Lab 04/20/17  0644 04/19/17  0640 04/18/17  0700 04/17/17  0617 04/15/17  0600   WBC 11.3* 12.5* 11.7* 14.6* 19.5*   RBC 2.92* 3.02* 3.16* 3.67* 3.46*   HGB 7.8* 8.1* 8.4* 10.0* 9.4*   HCT 25.4* 25.7* 26.9* 31.0* 29.7*   PLT " 185 187 147* 148* 177     Basic Metabolic Panel:    Recent Labs  Lab 04/20/17  0644 04/19/17  0640 04/18/17  0700 04/17/17  0617 04/16/17  1120 04/15/17  0600   * 145* 144 144 144 144   POTASSIUM 4.5 4.7 4.7 4.8 5.4* 5.6*   CHLORIDE 112* 111* 111* 111* 112* 113*   CO2 25 24 23 22 21 24   BUN 86* 86* 90* 87* 83* 93*   CR 4.18* 3.88* 3.82* 3.49* 3.09* 2.77*   * 130* 116* 130* 160* 120*   RICHARD 8.4* 8.6 8.5 9.2 8.7 8.8     INR    Recent Labs  Lab 04/19/17  0855   INR 1.26*      Attestation:   I have reviewed today's relevant vital signs, notes, medications, labs and imaging.    Camilo Maldonado MD  [facility] - Nephrology  [phone]

## 2017-04-20 NOTE — PLAN OF CARE
Problem: Goal Outcome Summary  Goal: Goal Outcome Summary  Outcome: No Change  Pt A&O, VSS, on 15L via oxymask, on scheduled and PRN nebs, Tele SR/ST, continues on IV Bumex, creatinine recheck in AM. Need Sputum sample d/t previous contaminated with sputum.

## 2017-04-20 NOTE — PROGRESS NOTES
[facility]  Infectious Disease Progress Note          Assessment and Plan:   IMPRESSION:   1. An 81-year-old [place] immigrant male with subacute worsening respiratory symptoms, possible new right lower lobe infiltrate, overall doubt this is conventional infection, . If infection, concern for more unusual or opportunistic infection.   2. Pulmonary fibrosis, recent diagnosis, some interstitial changes, question chronic infection versus other cause, no pathology, no high-dose steroids.   3. Diabetes mellitus.   4. Congestive heart failure.   5. Acute on chronic renal insufficiency. Worsening  6 New chest pain, ?      RECOMMENDATIONS:   1. Continue Zosyn and  Zithromax for aspiration/conventional coverage. It is certainly possible this is some conventional infection like aspiration but with the underlying interstitial disease and subacute symptoms would be concerned if it is infection this is something more chronic. Not having major fever or other sepsis symptoms. Procal high but renal failure so difficult to interpret  2.  Inflamm markers high, progressive interstial process, relatives and i nterpretor so more hx, I think after this hx even more unlikely this is a conventional infection, if not improved ? Bronch/lung biopsy, ? Renal failure/fluid also now a factor            Interval History:   no fever, O2 abouit same no + cxs sputum but cx reject,  Some reflux chest pain , by further hx this is mostly a subacute progressive issue              Medications:       azithromycin  500 mg Oral Daily     pantoprazole  40 mg Oral QAM     piperacillin-tazobactam  2.25 g Intravenous Q8H     sodium chloride (PF)  3 mL Intracatheter Q8H     insulin aspart  1-7 Units Subcutaneous TID AC     insulin aspart  1-5 Units Subcutaneous At Bedtime     amLODIPine  10 mg Oral Daily     metoprolol  100 mg Oral BID     acetaminophen  975 mg Oral Q8H     calcitRIOL  0.25 mcg Oral Daily     cholecalciferol  1,000  "Units Oral Daily     fluticasone-vilanterol  1 puff Inhalation Daily     pravastatin  80 mg Oral At Bedtime     ipratropium - albuterol 0.5 mg/2.5 mg/3 mL  3 mL Nebulization 4x daily     polyethylene glycol  17 g Oral BID                  Physical Exam:   Blood pressure 111/54, pulse 92, temperature 98.3  F (36.8  C), temperature source Oral, resp. rate 24, height 1.473 m (4' 10\"), weight 83.1 kg (183 lb 3.2 oz), SpO2 93 %.  Wt Readings from Last 2 Encounters:   04/20/17 83.1 kg (183 lb 3.2 oz)   01/12/17 83.4 kg (183 lb 12.8 oz)     Vital Signs with Ranges  Temp:  [98.3  F (36.8  C)-98.9  F (37.2  C)] 98.3  F (36.8  C)  Pulse:  [92] 92  Heart Rate:  [] 112  Resp:  [20-25] 24  BP: (104-121)/(54-62) 111/54  SpO2:  [75 %-98 %] 93 %    Constitutional: Awake, alert, cooperative, no apparent distress   Lungs: Crackles to auscultation bilaterally, few crackles or wheezing no consolidation   Cardiovascular: Regular rate and rhythm, normal S1 and S2, and no murmur noted   Abdomen: Normal bowel sounds, soft, non-distended, non-tender   Skin: No rashes, no cyanosis, no edema   Other:           Data:   All microbiology laboratory data reviewed.  Recent Labs   Lab Test  04/20/17   0644  04/19/17   0640  04/18/17   0700   WBC  11.3*  12.5*  11.7*   HGB  7.8*  8.1*  8.4*   HCT  25.4*  25.7*  26.9*   MCV  87  85  85   PLT  185  187  147*     Recent Labs   Lab Test  04/20/17   0644  04/19/17   0640  04/18/17   0700   CR  4.18*  3.88*  3.82*     Recent Labs   Lab Test  04/20/17   0644   SED  >140  Results confirmed by repeat test  *     Recent Labs   Lab Test  04/19/17   1550  04/12/17   2208  04/12/17   2206  01/11/17   0700  03/28/11   1200  03/27/11   1953  03/27/11   1950  03/26/11   1926  03/26/11   1355   CULT  >10 Squamous epithelial cells/low power field indicates oral contamination.   Please recollect.  Canceled, Test credited  Notification of test cancellation was given to Ursula Jennings RN @20:35 on 4/19/17,  KO  *  No " growth  No growth  Culture received and in progress.  Positive AFB results are called as soon as   detected.  Final report to follow in 7 to 8 weeks.  Assayed at [email or web address] [address]  Culture negative for acid fast bacilli    Canceled, Test credited  >10 Squamous epithelial cells/low power field indicates oral contamination.   Please recollect.  *  Canceled, Test credited  >10 Squamous epithelial cells/low power field indicates oral contamination.   Please recollect.  *  No growth The broth portion of this culture is being monitored for an additional 3 days.  If the broth becomes positive with growth, an amended report will be generated.  No growth after 6 days  No growth after 6 days  No MRSA isolated  No growth after 6 days

## 2017-04-20 NOTE — PLAN OF CARE
Problem: Goal Outcome Summary  Goal: Goal Outcome Summary  Outcome: No Change  Alert, oriented. Speaks cambodian through phone interpretor/live person interpretor. O2 11L oximask decreased by RT to 7L w/sats maintaining >90%.  With eating, oximizer 10L used then mask re-applied per pt preference.  Tachy. At 1000 w/MD in room, pt noted to be in afib (new) HR teens.Confirmed by EKG. Glucoses requiring ssi. IV bumesx discontinued. Creat increased.  Sputum w/red/susanne coloring - sent to lab.  Stool x1 soft brown w/red streaks, + occult.  Bloody rectum area w/wiping, no hemorrhoids noted. Hgb 8.1. Lungs bilateral crackles. IV zosyn & po zithromax. Per ID.  Intensivist (pulmonologist) consulted & saw pt w/plan to do Bronchoscopy tomorrow at 1030. NPO after 4a.m. Interpretor ordered [id number]. Consent needs signature when interpretor present in morning. She will see pt around 0930 to verify his consent and plan.

## 2017-04-20 NOTE — PROGRESS NOTES
[facility]  Hospitalist Progress Note  Jose D Golden MD 04/20/17    Reason for Stay (Diagnosis): respiratory failure         Assessment and Plan:      Summary of Stay:  Adina Rodriguez is a 81 year old male w/ hx of DM2, HTN, HLD, neuropathy, diastolic CHF, gout, and CKD4 admitted on 4/12/2017 with cough and sob.  Has had sob for the past month.  Also with 15-20 pound weight gain.  Persistent RLL infiltrate on CT which is possibly now fibrosis.  Has been on ceftriaxone/azithromycin then zosyn for possible recurrent aspiration pna.  No clear aspiration on SLP evaluation.  Does have elevated BNP and weight is up so has been receiving IV diuresis with good UOP, however now rising creatinine.  Nephrology consulted.  Have continued with diuresis for peripheral edema and possibility of pulmonary edema, but despite weight coming down he is not improving from a respiratory standpoint.  A repeat CT is showing bilateral groundglass opacities and infiltrates, some of which looks like fibrosis.  Have asked ID to assist with antibiotic management.  Azithromycin added to zosyn.  Pulmonology consulted and tentatively planning for bronchoscopy tomorrow morning if patient agreeable so npo at midnight.  Holding diuresis as creatinine rising.  Order some rheumatologic studies.  Developed afib 4/20 with controlled rate.  Had some hemoptysis, a blood nose, and is hemoccult positive so trending hemoglobin and increasing ppi.    Problem List/Assessment and Plan:   Acute hypoxemic respiratory failure: persistent hypoxia requiring 3-5 L of supplemental O2 with very limited exertional capacity.  Continues to have desaturation to 70's with exertion.  Likely multifactorial etiology.  Does have a RLL infiltrate concerning for infection, however has been present for nearly one month now.  Does have cough, but no fevers.  Has not significantly improve despite 7 days of antibiotics to cover for pneumonia.  Possibility of recurrent  aspiration raised by previously pulmonary consultation.  Has had coughing once in a while after eating fast, but has been seen by SLP without clear aspiration event.  It is possible that this infiltrate on RLL has progressed to fibrosis and unclear how much of this is reversible.  He did smoke for nearly 40 years, but quite 30 years ago.  Also with evidence for volume overload and likely contribution from diastolic CHF in setting of 15-20 lb weight gain last month, however symptoms not improved despite being 12 pounds down from admission.  Considered PE given tachycardia, d-dimer 0.6 on admission.  His repeat CT chest shows bilateral ground glass opacities and infiltrates of unclear etiology.  CRP and sed rate significantly elevated.  Question possibility of infection not covered by zosyn vs pulmonary edema although does not have this appearance vs progression of fibrosis.  IPF vs alternative pulmonary fibrosis vs atypical infection vs pulmonary edema vs  in differential.  -continue zosyn and azithromycin per ID  -hold diuresis for now due to rising creatinine (receieved bumex this am)  -pulmonary consultation, tentative plan for bronchoscopy tomorrow morning.  Npo at midnight  -follow rheumatologic studies  -repeat sputum culture    Possible aspiration pneumonia:  Possibility of aspiration, but none seen on swallow evaluation.  Bilateral infiltrates on repeat CT, however not clear this represents infection.  -continue zosyn for now (day 6-7 abx)     Acute on chronic diastolic CHF: grade II diastolic dysfunction on TTE.  BNP elevated at 3055.  Weight up 15-20 pounds over one month pta.   Does have PND and orthopnea.  Troponin elevated initially, but no WMA on TTE and more likely due to tachycardia and CKD.  No chest pain.  Metoprolol has been started.  Weight is down 15 pounds since admission.  Unclear how much more fluid can be removed.  -continue metoprolol 100mg bid for now  -holding losartan for rising  creatinine  -continue bumex for now, nephrology to see  -strict I/Os and daily weights  -continue redmond    New afib: patient has been in sinus tachycardia throughout admission with HR 100s likely due to his hypoxia and respiratory failure.  Developed afib 4/19.  He did not notice a change.  His HR is actually ok in the 90s.  He is on metoprolol already.  With hemoptysis and positive hemooccult he is not an anticoagulation candidate at this time which was discussed with him and family.  -continue telemetry  -monitor electrolytes  -continue metoprolol    JIMMY on CKD stage IV: creatinine baseline 2.5-2.7 likely.  3 days of rising creatinine with diuresis for acute diastolic CHF.  ARB is on hold.  Some mild hyperkalemia, better now.  -holding losartan  -nephrology consulted     Acute on chronic anemia:  Has chronic anemia likely from CKD.  Now with slow drift in hemoglobin down to 7.8 which may be due to acute blood loss.  Some hemoptysis for last 3 days.  Had a nose bleed 2 days ago.  Report of blood in stool 2 days ago.  Check hemoccult which is positive.  Just saw last RN note that shows some blood streaks with wiping, so more likely hemorrhoids as cause for positive hemoccult.  He does endorse reflux and has been off his ppi, unclear as to why.  -bid ppi  -if hemoglobin continues to drop or additional bloody stool will consult GI  -daily cbc    Type II DM: A1c 7.2. Diet controlled.  BG here ok, almost all under 180.    HTN:  pta on norvasc 10mg daily, losartan 50mg daily, and bumex 1mg daily. swithced to metoprolol this admission.  -continue 100mg bid metoprolol and amlodipine  -continue to hold losartan for jimmy    GERD:  Increase protonix to bid.    Gout:  Allopurinol on hold for JIMMY    DVT Prophylaxis: Pneumatic Compression Devices  Code Status: Full Code  FEN: renal diet  Discharge Dispo: home with family likely  Estimated Disch Date / # of Days until Disch: unclear, pending improvement in hypoxia.  Likely a few  "more days        Interval History (Subjective):      Acutely more sob and hypoxic overnight up to 15L via oxymask.  He also is endorsing worsening heartburn today.  He has had some ongoing coughing up of blood tinged sputum.  His fecal occult blood is positive.  Developed afib today.  Discussed with him pulmonary consultation.  Discussed plan of care with patient and family at bedside.    Entire encounter complete with assistance of professional live .                  Physical Exam:      Last Vital Signs:  /60 (BP Location: Left arm)  Pulse 92  Temp 98.3  F (36.8  C) (Oral)  Resp 24  Ht 1.473 m (4' 10\")  Wt 83.1 kg (183 lb 3.2 oz)  SpO2 96%  BMI 38.29 kg/m2    Intake/Output Summary (Last 24 hours) at 04/20/17 1347  Last data filed at 04/20/17 1300   Gross per 24 hour   Intake             1206 ml   Output             1950 ml   Net             -744 ml       Constitutional: Awake, NAD  Eyes: sclera white   HEENT: dry mucous membranes  Respiratory: tachypnea.  Bilateral crackles worse at base  Cardiovascular: irregularly irregular rhythm, regular rate.  No murmur   GI: non-tender, not distended, bowel sounds present  Skin: no rash or lesions, acyanotic  Musculoskeletal/extremities: 1+ bilateral LE edema  Neurologic: A&O   Psychiatric: calm, cooperative          Medications:      All current medications were reviewed with changes reflected in problem list.         Data:      All new lab and imaging data was reviewed.   Labs:    Recent Labs  Lab 04/20/17  0644 04/19/17  0640 04/18/17  0700   * 145* 144   POTASSIUM 4.5 4.7 4.7   CHLORIDE 112* 111* 111*   CO2 25 24 23   ANIONGAP 9 10 10   * 130* 116*   BUN 86* 86* 90*   CR 4.18* 3.88* 3.82*   GFRESTIMATED 14* 15* 15*   GFRESTBLACK 17* 18* 18*   RICHARD 8.4* 8.6 8.5       Recent Labs  Lab 04/20/17  0644 04/19/17  0640 04/18/17  0700   WBC 11.3* 12.5* 11.7*   HGB 7.8* 8.1* 8.4*   HCT 25.4* 25.7* 26.9*   MCV 87 85 85    187 147*    " positive occult blood  RF 20    ESR > 140    Imaging:   EKG:  afib    Jose D Golden MD

## 2017-04-20 NOTE — PLAN OF CARE
Problem: Goal Outcome Summary  Goal: Goal Outcome Summary  Outcome: No Change  VSS, LS dim/fine crackles/clear at times on 11-15L high flow oxymask.  Lactic acid 0.5.  Difficult to tell orientation d/t language barrier, alert, SBA.  Oxy x2 for headache.   scheduled at 1000. On Zosyn and Zithromax.  Tele: SR/ST.  Plan for d/c 3+ days.  Will continue to monitor.

## 2017-04-21 NOTE — PLAN OF CARE
RT note-  Patient was transferred to the ICU on HFNC 40LPM 90%.  Patient had bronchoscopy at bedside. Lidocaine neb was given as per order. RT at bedside. Patient tolerated without incident  Nebs givne inline via aerogen as per order. BS-coarse. Productive cough susanne,colored sputum/blood streaked.  RT will continue to follow

## 2017-04-21 NOTE — OR NURSING
Bronchoscopy done at pt bed side in ICU room 362 with the present of the  and ICU Rn and respiratory therapist ,pt tolerated procedure well ,Md updated pt family and pt regarding the out come of procedure ,sedation was given by ICU Rn nahid ,specimen collected and released by ICU Rn and taken to lab by endo Rn ,pt tolerated procedure well ,pt was monitored during and after procedure by ICU Rn nahid

## 2017-04-21 NOTE — PROGRESS NOTES
Patient having increased FIO2 needs throughout night. Was placed on HFNC at 2345. Started on 40 LPM 65% and now on 90% SPO2 90-92%. RR 22-26, BS crackles. Patient did receive PRN nebulizer with no improvement. RT will continue to follow.    Chyna Wong, RT 4/21/2017, 6:20 AM

## 2017-04-21 NOTE — PROGRESS NOTES
[facility]  Hospitalist Progress Note  Jose D Golden MD 04/21/17    Reason for Stay (Diagnosis): respiratory failure         Assessment and Plan:      Summary of Stay:  Adina Rodriguez is a 81 year old male w/ hx of DM2, HTN, HLD, neuropathy, diastolic CHF, gout, and CKD4 admitted on 4/12/2017 with cough and sob.  Has had sob for the past month.  Also with 15-20 pound weight gain.  Persistent RLL infiltrate on CT which is possibly now fibrosis.  Has been on ceftriaxone/azithromycin then zosyn for possible recurrent aspiration pna.  No clear aspiration on SLP evaluation.  Does have elevated BNP and weight is up so was receiving IV diuresis with good UOP.  Nephrology consulted.  Did continue with diuresis for peripheral edema and possibility of pulmonary edema, but despite weight coming down he is not improving from a respiratory standpoint.  A repeat CT is showing bilateral groundglass opacities and infiltrates, some of which looks like fibrosis.  Have asked ID to assist with antibiotic management.  Azithromycin added to zosyn.  Pulmonology consulted.  Holding diuresis for now as creatinine rising and he is intravascularly dry so adding some IV albumin.  Ordered some rheumatologic studies.  Developed afib 4/20 with controlled rate.  Had some hemoptysis, a blood nose, and is hemoccult positive so trending hemoglobin and increasing ppi.  Worsening respiratory status and now on HFNC so transferred to ICU 4/21.  Bronchoscopy 4/21 as well showing some blood in the airways, studies sent.  Adding steroids today.      Problem List/Assessment and Plan:   Acute hypoxemic respiratory failure: persistent progressive hypoxemic respiratory failure now on HFNC with high FIO2.  Unclear as to the acute cause of his bilateral infiltrates to this point.  He has been on zosyn for multiple days without much improved so ID was consulted and azithromycin added as leukocytosis persistent with some fevers.  He does have a frequent  cough and hemoptysis for the past few days, but this is also in the setting of epistaxis.  Possibility of recurrent aspiration raised by previously pulmonary consultation and he was off his ppi for a few days for unclear reason so I have since resumed this at bid.  Has had coughing once in a while after eating fast, but has been seen by SLP without clear aspiration event.  His repeat chest CT shows bilateral infiltrates throughout both lungs along with some likely fibrosis in lower lobes that is not new.  He did have evidence for volume overload and likely contribution from diastolic CHF in setting of 15-20 lb weight gain last month, however symptoms not improved despite being 15 pounds down from admission and now appears dry.  His CRP and sed rate significantly elevated raising question of an acute inflammatory process that may respond to steroids vs an acute infection.  Again as above no improvement yet on antibiotics alone.  Remaining in the differential is IPF vs alternative pulmonary fibrosis vs atypical infection vs pulmonary edema vs  vs vasculitis among others.  Bronchoscopy has been done showing more blood in the airways than expected for just epistaxis.   -continue zosyn and azithromycin per ID  -hold diuresis for now as intravascularly dry  -pulmonary consulted  -started on methylprednisolone 62.5mg q 8 hrs  -follow rheumatologic studies  -follow bronchoscopy studies  -patient would want intubation for potential reversible causes    Possible aspiration pneumonia:  Possibility of aspiration, but none seen on swallow evaluation.  Bilateral infiltrates on repeat CT, however not clear this represents typical infection.  -continue zosyn for now  -azithromycin since added as well    Acute on chronic diastolic CHF: grade II diastolic dysfunction on TTE.  BNP elevated at 3055.  Weight up 15-20 pounds over one month pta.   Does have PND and orthopnea.  Troponin elevated initially, but no WMA on TTE and more  likely due to tachycardia and CKD.  No chest pain.  Metoprolol has been started.  Weight is down 15 pounds since admission.  Unclear how much more fluid can be removed.  -continue metoprolol 100mg bid for now  -holding losartan for rising creatinine  -stopped diuresis 4/20 as appears intravascularly dry  -strict I/Os and daily weights  -continue redmond    New afib: patient has been in sinus tachycardia throughout admission with HR 100s likely due to his hypoxia and respiratory failure.  Developed afib 4/19.  He did not notice a change.  His HR is actually ok in the 90s.  He is on metoprolol already.  With hemoptysis and positive hemooccult he is not an anticoagulation candidate at this time which was discussed with him and family.  -continue telemetry  -monitor electrolytes  -continue metoprolol, can add prn IV metoprolol if needed for rate control    JIMMY on CKD stage IV: creatinine baseline 2.5-2.7 likely.  Continues to have rising creatinine likely in part from over diuresis.  -holding losartan  -nephrology consulted   -25g albumin today over 10 hrs as appears dry    Acute on chronic anemia:  Has chronic anemia likely from CKD.  Now with slow drift in hemoglobin down to 7-8 which may be due to acute blood loss.  Some hemoptysis for last 3 days.  Has intermittent epistaxis.  Report of blood in stool 2 days ago.  Check hemoccult which is positive.  Does have some blood streaks with wiping, so more likely hemorrhoids as cause for positive hemoccult.  He does endorse reflux and has been off his ppi, unclear as to why.  -bid ppi    Epistaxis:   Likely due to high flow oxygen.  Vasculitis a consideration given combination of renal disease.  -nasal saline gel and spray prn  -afrin bid prn   -humidify air    Type II DM: A1c 7.2. Diet controlled.  BG mostly ok here.  -medium dose SSI    HTN:  pta on norvasc 10mg daily, losartan 50mg daily, and bumex 1mg daily. swithced to metoprolol this admission.  -continue 100mg bid  "metoprolol and amlodipine  -continue to hold losartan for jimmy    GERD:  Increased protonix to bid.    Gout:  Allopurinol on hold for JIMMY    DVT Prophylaxis: Pneumatic Compression Devices  Code Status: Full Code.  25 minute discussion with patient and his grand daughter in presence of  regarding code status.  Would like to wait to discuss further with rest of family before making any changes.  Overall prognosis guarded given his CKD, CHF, and now progressive pulmonary process.  FEN: renal diet  Discharge Dispo: unclear  Estimated Disch Date / # of Days until Disch: unclear, pending improvement in hypoxia. Now in ICU        Interval History (Subjective):      Acute decompensation overnight and into morning on HFNC.  Patient feels very sob.  Ongoing hemoptysis and epistaxis.  Having some abdominal cramping that he says is from his bladder.  Benoit in place.  Harsh cough continues.  Denies chest pain, but has had palpations when HR fast.  Is hungry.  Transferred to ICU.  Discussed code status at length.    Entire encounter complete with assistance of professional live .                  Physical Exam:      Last Vital Signs:  /82  Pulse 92  Temp 98.4  F (36.9  C) (Oral)  Resp 22  Ht 1.52 m (4' 11.84\")  Wt 83.1 kg (183 lb 3.2 oz)  SpO2 97%  BMI 35.97 kg/m2    Intake/Output Summary (Last 24 hours) at 04/21/17 1635  Last data filed at 04/21/17 1400   Gross per 24 hour   Intake                0 ml   Output             1250 ml   Net            -1250 ml     Constitutional: Awake, NAD  Eyes: sclera white   HEENT: dry mucous membranes, HFNC on  Respiratory: tachypnea.  Bilateral crackles worse at base.  No wheeze  Cardiovascular: irregularly irregular rhythm, tachycardic.  No murmur   GI: non-tender, not distended, bowel sounds present  Skin: no rash or lesions, acyanotic  Musculoskeletal/extremities: 1+ bilateral LE edema  Neurologic: A&O   Psychiatric: anxious          Medications:      All " current medications were reviewed with changes reflected in problem list.         Data:      All new lab and imaging data was reviewed.   Labs:    Recent Labs  Lab 04/21/17  0650 04/20/17  0644 04/19/17  0640    146* 145*   POTASSIUM 4.3 4.5 4.7   CHLORIDE 110* 112* 111*   CO2 23 25 24   ANIONGAP 10 9 10   * 244* 130*   BUN 84* 86* 86*   CR 4.53* 4.18* 3.88*   GFRESTIMATED 13* 14* 15*   GFRESTBLACK 15* 17* 18*   RICHARD 8.6 8.4* 8.6       Recent Labs  Lab 04/21/17  0650 04/20/17  0644 04/19/17  0640   WBC 11.7* 11.3* 12.5*   HGB 8.3* 7.8* 8.1*   HCT 26.8* 25.4* 25.7*   MCV 86 87 85    185 187   Iron 15  Albumin 2.0  Ferritin 506  Iron binding capacity 186    Imaging:   None today    Jose D Golden MD

## 2017-04-21 NOTE — PLAN OF CARE
Patient alert and oriented x 3, on 02 via high flow canula 65% + 40L HoB elevated. LS crackles all funes, desats without supplemental 02. 0020 Temp of 100.7 Tylenol PRN admn. Temp down to 97 at 0400. NPO after 4am instructed patient not to eat anything due to bronchoscopy procedure at 1030am. Pt. desat to 87% notified RT. Neb given and up 02 to 80% at 40L via high flow canula. Patient alert but appears very tired. BS: 154

## 2017-04-21 NOTE — PROGRESS NOTES
"[facility]     Renal Progress Note       SHORTHAND KEY FOR MY NOTES:  c = with, s = without, p = after, a = before, x = except, asx = asymptomatic, tx = transplant or treatment, sx = symptoms or symptomatic, cx = canceled or culture, rxn = reaction, yday = yesterday, nl = normal, abx = antibiotics, fxn = function, dx = diagnosis, dz = disease, m/h = melena/hematochezia, c/d/l/ha = cramping/dizziness/lightheadedness/headache, d/c = discharge or diarrhea/constipation, f/c/n/v = fevers/chills/nausea/vomiting, cp/sob = chest pain/shortness of breath.         Assessment/Plan:     1.  JIMMY/CKD.  Pt's cr continues to rise, but he is making a decent amt of urine.  He is clinically dry (no edema, hb up, dry mouth) likely from overdiuresis.  Chemistries are fine.     A.  Follow labs, uo, sx.  B.  Start some fluids - 5% alb 500 cc over 10h.    C.  Continue redmond for now to monitor I/Os.    2.  Hypoxia.  Pt is due for a bronch today, which will be done in the ICU.  Clinically, he appears stable.  His lung exam is unchanged.  Seen by ID and some tests pending - on abx.    A.  Follow clinically.  B.  Continue supp o2.  C.  Continue abx at proper renal doses.  D.  Await bronch results.      3.  Fe def anemia.  Hb is up a bit today from hemoconcentration.  Some blood in stools from hemorrhoids.  He is fe def, but will hold on giving IV fe given his potential infxn.    A.  Follow hb, clinically.    4.  HTN.  Pt's BP is ok.   A.  Continue same meds/doses of BP meds.    5.  FEN.  Na is better p holding diuretics - less free water loss.  He is drinking water prn.  A.  Follow labs.           Interval History:     Pt seen c .  Dtr also present.    He feels that his breathing is the same as yday, in general.  After the blood clots were removed from his nose, he felt better.  He denies major uremic sx - appetite is ok, food tastes bland (\"bc of the kidney diet\"), no n/v.  He feels dry.  Still urinating ok via redmond. " " No d/l.    When asked if he would do dialysis, he stated through the  that he might prefer to go to [facility] and die in peace there.  His dtr was surprised bc she thought he didn't like it there.  They are waiting for pt's wife/son to arrive before having any further discussions about code status/end-of-life.         Medications and Allergies:       azithromycin  500 mg Oral Daily     pantoprazole  40 mg Oral BID AC     nystatin  500,000 Units Swish & Spit 4x Daily     piperacillin-tazobactam  2.25 g Intravenous Q8H     sodium chloride (PF)  3 mL Intracatheter Q8H     insulin aspart  1-7 Units Subcutaneous TID AC     insulin aspart  1-5 Units Subcutaneous At Bedtime     amLODIPine  10 mg Oral Daily     metoprolol  100 mg Oral BID     calcitRIOL  0.25 mcg Oral Daily     cholecalciferol  1,000 Units Oral Daily     fluticasone-vilanterol  1 puff Inhalation Daily     pravastatin  80 mg Oral At Bedtime     ipratropium - albuterol 0.5 mg/2.5 mg/3 mL  3 mL Nebulization 4x daily     polyethylene glycol  17 g Oral BID     Allergies   Allergen Reactions     Lisinopril      Hyperkalemia, elevated renal function          Physical Exam:     Vitals were reviewed    Heart Rate: 119, Blood pressure 142/75, pulse 92, temperature 98  F (36.7  C), temperature source Oral, resp. rate 22, height 1.473 m (4' 10\"), weight 83.1 kg (183 lb 4.8 oz), SpO2 96 %.  Wt Readings from Last 3 Encounters:   04/21/17 83.1 kg (183 lb 4.8 oz)   01/12/17 83.4 kg (183 lb 12.8 oz)   12/07/16 85.3 kg (188 lb)     Intake/Output Summary (Last 24 hours) at 04/21/17 1008  Last data filed at 04/21/17 0536   Gross per 24 hour   Intake              723 ml   Output             1150 ml   Net             -427 ml     GENERAL APPEARANCE: pleasant, NAD, alert, lying in bed  HEENT:  Eyes/ears/nose/neck grossly normal x dry mouth, poor dentition  RESP: lungs c insp crackles at bases; + high-flow nc  CV: irreg/irreg, tachy, nl S1/S2   ABDOMEN: " o/s/nt/nd  EXTREMITIES/SKIN: no ble or sacral edema  OTHER:  + redmond c light yellow urine         Data:     CBC RESULTS:    Recent Labs  Lab 04/21/17  0650 04/20/17  0644 04/19/17  0640 04/18/17  0700 04/17/17  0617 04/15/17  0600   WBC 11.7* 11.3* 12.5* 11.7* 14.6* 19.5*   RBC 3.12* 2.92* 3.02* 3.16* 3.67* 3.46*   HGB 8.3* 7.8* 8.1* 8.4* 10.0* 9.4*   HCT 26.8* 25.4* 25.7* 26.9* 31.0* 29.7*    185 187 147* 148* 177     Basic Metabolic Panel:    Recent Labs  Lab 04/21/17  0650 04/20/17  0644 04/19/17  0640 04/18/17  0700 04/17/17  0617 04/16/17  1120    146* 145* 144 144 144   POTASSIUM 4.3 4.5 4.7 4.7 4.8 5.4*   CHLORIDE 110* 112* 111* 111* 111* 112*   CO2 23 25 24 23 22 21   BUN 84* 86* 86* 90* 87* 83*   CR 4.53* 4.18* 3.88* 3.82* 3.49* 3.09*   * 244* 130* 116* 130* 160*   RICHARD 8.6 8.4* 8.6 8.5 9.2 8.7     INR    Recent Labs  Lab 04/19/17  0855   INR 1.26*      Attestation:   I have reviewed today's relevant vital signs, notes, medications, labs and imaging.    Camilo Maldonado MD  [facility] - Nephrology  [phone]

## 2017-04-21 NOTE — PROGRESS NOTES
Transfer to ICU for bronch, close resp. Monitoring. Report porvided to Katie HOWARD RN Belongings sent w/pt & daughter Bee to room 362.

## 2017-04-21 NOTE — PLAN OF CARE
Problem: Individualization  Goal: Patient Preferences  Outcome: No Change  ICU End of Shift Summary.  For vital signs and complete assessments, please see documentation flowsheets.      Pertinent assessments: Bronch completed at bedside and pt diana well, lungs diminished and coarse, coughing a lot this afternoon blood tinged sputum and thick white susanne color, HFNC 90% 40l 97%, dry nose with small dried blood, able to use yankuer to suction self  Major Shift Events: Bronch after transfer from 3rd  Plan (Upcoming Events): await results of bronch, protect airway  Discharge/Transfer Needs: TBD     Bedside Shift Report Completed : yes  Bedside Safety Check Completed: yes

## 2017-04-21 NOTE — PLAN OF CARE
Problem: Goal Outcome Summary  Goal: Goal Outcome Summary  Outcome: No Change  ICU End of Shift Summary.  For vital signs and complete assessments, please see documentation flowsheets.      Pertinent assessments:  Coughing up blood tinged thick sputum frequently. On HFNC 65% fio2 with 40 LPM. In/out of AFib.  Major Shift Events: Got pt up to the chair. Tolerating renal diet.   Plan (Upcoming Events):  Continue to wean 02 as able.    Discharge/Transfer Needs: TBD     Bedside Shift Report Completed :   Bedside Safety Check Completed:

## 2017-04-21 NOTE — PROGRESS NOTES
[facility]  Infectious Disease Progress Note          Assessment and Plan:   IMPRESSION:   1. An 81-year-old [place] immigrant male with subacute worsening respiratory symptoms, possible new right lower lobe infiltrate, overall doubt this is conventional infection, . If infection, concern for more unusual or opportunistic infection.   2. Pulmonary fibrosis, recent diagnosis, some interstitial changes, question chronic infection versus other cause, no pathology, no high-dose steroids.   3. Diabetes mellitus.   4. Congestive heart failure.   5. Acute on chronic renal insufficiency. Worsening  6 New chest pain, ?      RECOMMENDATIONS:   1. Continue Zosyn and  Zithromax for aspiration/conventional coverage. It is certainly possible this is some conventional infection like aspiration but with the underlying interstitial disease and subacute symptoms would be concerned if it is infection this is something more chronic. Not having major fever or other sepsis symptoms. Procal high but renal failure so difficult to interpret  2.  Inflamm markers high, progressive interstial process, relatives and i nterpretor so more hx, I think after this hx even more unlikely this is a conventional infection, bronch planned, Renal failure/fluid also now a factor            Interval History:   no fever, O2 about same no + cxs sputum but cx reject,  Some reflux chest pain , by further hx this is mostly a subacute progressive issue              Medications:       midazolam  0.5-1 mg Intravenous Once within 24 hrs     fentaNYL  25-50 mcg Intravenous Once within 24 hrs     methylPREDNISolone  62.5 mg Intravenous Q8H     azithromycin  500 mg Oral Daily     pantoprazole  40 mg Oral BID AC     nystatin  500,000 Units Swish & Spit 4x Daily     piperacillin-tazobactam  2.25 g Intravenous Q8H     sodium chloride (PF)  3 mL Intracatheter Q8H     insulin aspart  1-7 Units Subcutaneous TID AC     insulin aspart  1-5 Units  "Subcutaneous At Bedtime     amLODIPine  10 mg Oral Daily     metoprolol  100 mg Oral BID     calcitRIOL  0.25 mcg Oral Daily     cholecalciferol  1,000 Units Oral Daily     fluticasone-vilanterol  1 puff Inhalation Daily     pravastatin  80 mg Oral At Bedtime     ipratropium - albuterol 0.5 mg/2.5 mg/3 mL  3 mL Nebulization 4x daily     polyethylene glycol  17 g Oral BID                  Physical Exam:   Blood pressure 143/82, pulse 92, temperature 98.4  F (36.9  C), temperature source Oral, resp. rate 22, height 1.52 m (4' 11.84\"), weight 83.1 kg (183 lb 3.2 oz), SpO2 97 %.  Wt Readings from Last 2 Encounters:   04/21/17 83.1 kg (183 lb 3.2 oz)   01/12/17 83.4 kg (183 lb 12.8 oz)     Vital Signs with Ranges  Temp:  [97  F (36.1  C)-100.7  F (38.2  C)] 98.4  F (36.9  C)  Heart Rate:  [] 120  Resp:  [22] 22  BP: (106-143)/(46-82) 143/82  FiO2 (%):  [70 %-90 %] 90 %  SpO2:  [90 %-99 %] 97 %    Constitutional: Awake, alert, cooperative, no apparent distress   Lungs: Crackles to auscultation bilaterally, few crackles or wheezing no consolidation   Cardiovascular: Regular rate and rhythm, normal S1 and S2, and no murmur noted   Abdomen: Normal bowel sounds, soft, non-distended, non-tender   Skin: No rashes, no cyanosis, no edema   Other:           Data:   All microbiology laboratory data reviewed.  Recent Labs   Lab Test  04/21/17   0650  04/20/17   0644  04/19/17   0640   WBC  11.7*  11.3*  12.5*   HGB  8.3*  7.8*  8.1*   HCT  26.8*  25.4*  25.7*   MCV  86  87  85   PLT  203  185  187     Recent Labs   Lab Test  04/21/17   0650  04/20/17   0644  04/19/17   0640   CR  4.53*  4.18*  3.88*     Recent Labs   Lab Test  04/20/17   0644   SED  >140  Results confirmed by repeat test  *     Recent Labs   Lab Test  04/20/17   1018  04/19/17   1550  04/12/17   2208  04/12/17   2206  01/11/17   0700  03/28/11   1200  03/27/11   1953  03/27/11   1950  03/26/11 1926   CULT  Canceled, Test credited  >10 Squamous epithelial " cells/low power field indicates oral contamination.   Please recollect.  Notification of test cancellation was given to Malia Martinez Rn @ 1636 4/20/17 CS  *  >10 Squamous epithelial cells/low power field indicates oral contamination.   Please recollect.  Canceled, Test credited  Notification of test cancellation was given to Ursula Jennings, RN @20:35 on 4/19/17,  KO  *  No growth  No growth  Culture received and in progress.  Positive AFB results are called as soon as   detected.  Final report to follow in 7 to 8 weeks.  Assayed at [facility] [address]  Culture negative for acid fast bacilli    Canceled, Test credited  >10 Squamous epithelial cells/low power field indicates oral contamination.   Please recollect.  *  Canceled, Test credited  >10 Squamous epithelial cells/low power field indicates oral contamination.   Please recollect.  *  No growth The broth portion of this culture is being monitored for an additional 3 days.  If the broth becomes positive with growth, an amended report will be generated.  No growth after 6 days  No growth after 6 days  No MRSA isolated

## 2017-04-21 NOTE — PROGRESS NOTES
Evaluated patient at bedside with live .  He is now on HFNC at 90% FIO2,  O2 sats are mid 90's and he appears comfortable so likely can wean down FIO2.  He has had persistent mild epistaxis.  He does not have increased crackles today.  His creatinine is increased so holding diuretic.  His hemoglobin is up from yesterday and platelets stable.  -saline nasal spray  -afrin spray bid prn for epistaxis  -patient agreeable to bronchoscopy, further questions can be answered during time of consent  -patient confirms he would want intubation if his respiratory status were to decline further to treat possible reversible causes  -long discussion regarding code status in presence of .  He appeared to be considering DNR status, but did not want to make a change without discussing this with family as well.  For now he remains full code, but I will try to discuss this again when family present so will try to do later today.  -if respiratory status declining will move to ICU    (full note to follow)    Transfer to ICU for close monitoring and bronchoscopy.  There is chance of needing mechanical ventilation following bronchoscopy.  Discussed with nephrology.  Albumin to be ordered as patient appears intravascularly dry.

## 2017-04-21 NOTE — PROGRESS NOTES
RT called to assess patient. SPO2 85% on 90%, 40L HFNC. Patient agitated and pulling cannula in and out of nares. He pulled small, deep red, blood clots from his nose. I tried NT suctioning but just further aggravated the site.     Pt turned up to 50 L, nebs given, deep breathing instructions given ( via ) and SPO2 raised to 93%. Patient transferred to ICU.    Will continue to follow and assess.

## 2017-04-22 NOTE — PROGRESS NOTES
[facility]  Infectious Disease Progress Note          Assessment and Plan:   IMPRESSION:   1. An 81-year-old [place] immigrant male with subacute worsening respiratory symptoms, possible new right lower lobe infiltrate, overall doubt this is conventional infection, . If infection, concern for more unusual or opportunistic infection.   2. Pulmonary fibrosis, recent diagnosis, some interstitial changes, question chronic infection versus other cause, no pathology, no high-dose steroids.   3. Diabetes mellitus.   4. Congestive heart failure.   5. Acute on chronic renal insufficiency. Worsening  6. New facial droop on 4/22: stroke code, pending imaging, per my exam the droop  is very mild on the left.       RECOMMENDATIONS:   1. Continue Zosyn and  Zithromax for aspiration/conventional coverage. It is certainly possible this is some conventional infection like aspiration but with the underlying interstitial disease and subacute symptoms would be concerned if it is infection this is something more chronic. Not having major fever or other sepsis symptoms. Procal high but renal failure so difficult to interpret  2.  Inflamm markers high, progressive interstial process, relatives and i nterpretor so more hx, I think after this hx even more unlikely this is a conventional infection,Bronch done.   3. Now with possible stroke watch for imaging, symptoms             Interval History:   no fever, O2 about same no + cxs sputum but cx reject,  Some reflux chest pain , by further hx this is mostly a subacute progressive issue    New stroke evaluation underway.               Medications:       methylPREDNISolone  62.5 mg Intravenous Q6H     [START ON 4/23/2017] aspirin  325 mg Oral Daily     midazolam  0.5-1 mg Intravenous Once within 24 hrs     fentaNYL  25-50 mcg Intravenous Once within 24 hrs     azithromycin  500 mg Oral Daily     pantoprazole  40 mg Oral BID AC     nystatin  500,000 Units Swish & Spit  "4x Daily     piperacillin-tazobactam  2.25 g Intravenous Q8H     sodium chloride (PF)  3 mL Intracatheter Q8H     insulin aspart  1-7 Units Subcutaneous TID AC     insulin aspart  1-5 Units Subcutaneous At Bedtime     amLODIPine  10 mg Oral Daily     metoprolol  100 mg Oral BID     calcitRIOL  0.25 mcg Oral Daily     cholecalciferol  1,000 Units Oral Daily     fluticasone-vilanterol  1 puff Inhalation Daily     pravastatin  80 mg Oral At Bedtime     ipratropium - albuterol 0.5 mg/2.5 mg/3 mL  3 mL Nebulization 4x daily     polyethylene glycol  17 g Oral BID                  Physical Exam:   Blood pressure 102/42, pulse 92, temperature 97.8  F (36.6  C), temperature source Oral, resp. rate 23, height 1.52 m (4' 11.84\"), weight 83.9 kg (184 lb 15.5 oz), SpO2 92 %.  Wt Readings from Last 2 Encounters:   04/22/17 83.9 kg (184 lb 15.5 oz)   01/12/17 83.4 kg (183 lb 12.8 oz)     Vital Signs with Ranges  Temp:  [97.8  F (36.6  C)-100.1  F (37.8  C)] 97.8  F (36.6  C)  Heart Rate:  [] 112  Resp:  [16-41] 23  BP: ()/(42-74) 102/42  FiO2 (%):  [60 %-100 %] 70 %  SpO2:  [89 %-100 %] 92 %    Constitutional: Awake, alert, cooperative, no apparent distress  Very mild droop left side not sure if was present before    Lungs: Crackles to auscultation bilaterally, few crackles or wheezing no consolidation   Cardiovascular: Regular rate and rhythm, normal S1 and S2, and no murmur noted   Abdomen: Normal bowel sounds, soft, non-distended, non-tender   Skin: No rashes, no cyanosis, no edema   Other:           Data:   All microbiology laboratory data reviewed.  Recent Labs   Lab Test  04/22/17   0520  04/21/17   0650  04/20/17   0644   WBC  13.6*  11.7*  11.3*   HGB  7.5*  8.3*  7.8*   HCT  24.7*  26.8*  25.4*   MCV  86  86  87   PLT  200  203  185     Recent Labs   Lab Test  04/22/17   0520  04/21/17   0650  04/20/17   0644   CR  4.94*  4.53*  4.18*     Recent Labs   Lab Test  04/20/17   0644   SED  >140  Results confirmed " by repeat test  *     Recent Labs   Lab Test  04/20/17   1018  04/19/17   1550  04/12/17   2208  04/12/17   2206  01/11/17   0700  03/28/11   1200  03/27/11   1953  03/27/11   1950  03/26/11 1926   CULT  Canceled, Test credited  >10 Squamous epithelial cells/low power field indicates oral contamination.   Please recollect.  Notification of test cancellation was given to Malia Martinez Rn @ 2335 4/20/17 CS  *  >10 Squamous epithelial cells/low power field indicates oral contamination.   Please recollect.  Canceled, Test credited  Notification of test cancellation was given to Ursula Jennings RN @20:35 on 4/19/17,  KO  *  No growth  No growth  Culture received and in progress.  Positive AFB results are called as soon as   detected.  Final report to follow in 7 to 8 weeks.  Assayed at [facility] [address]  Culture negative for acid fast bacilli    Canceled, Test credited  >10 Squamous epithelial cells/low power field indicates oral contamination.   Please recollect.  *  Canceled, Test credited  >10 Squamous epithelial cells/low power field indicates oral contamination.   Please recollect.  *  No growth The broth portion of this culture is being monitored for an additional 3 days.  If the broth becomes positive with growth, an amended report will be generated.  No growth after 6 days  No growth after 6 days  No MRSA isolated

## 2017-04-22 NOTE — PLAN OF CARE
Problem: Goal Outcome Summary  Goal: Goal Outcome Summary  Outcome: No Change  ICU End of Shift Summary.  For vital signs and complete assessments, please see documentation flowsheets.      Pertinent assessments: HIflo o2 80%. LS course. Prn nebs. Afib. Denies pain. Large BM. Benoit patent. Non productive cough.   Major Shift Events: N/A  Plan (Upcoming Events): TBD  Discharge/Transfer Needs:      Bedside Shift Report Completed :   Bedside Safety Check Completed:

## 2017-04-22 NOTE — PROGRESS NOTES
Renal Medicine Progress Note            Assessment/Plan:     1. Patient with baseline CKD 4    2. Acute kidney injury. Patient does not need RRT today, but it seems he is heading. Patient agreed to dialysis if required. I left a message for his daughter   -looks dry but reluctant to give fluid given his respiratory status   -may needs RRT in 1-2 days     3. Respiratory distress/Pulmonary fibrosis. Presumed PNA. Bronch on 4/21 with alveolar hemorrhage. High dose steroid was started. His breathing is pretty labored. He may needs to be intubated.   -I doubt this is renal-pulm syndrome   -PATRICIA/ANCA negative   -RF upper limit of normal   -anti-GBM ordered    4. HTN. SBP is on the low side.   -D/C Norvasc    5. Grade II DD. Pt looks dry clinically.    6. Code stroke earlier today (L facial numbness and possible droop)   -HCT negative        Interval History:     Pt had a code stroke this AM for numbness of the left face and possible left facial droop. HCT was negative. Urine output is decreasing.   BUN/Scr are about the same. His breathing is pretty labored on high flow.           Medications and Allergies:       methylPREDNISolone  62.5 mg Intravenous Q6H     [START ON 4/23/2017] aspirin  325 mg Oral Daily     azithromycin  500 mg Oral Daily     pantoprazole  40 mg Oral BID AC     nystatin  500,000 Units Swish & Spit 4x Daily     sodium chloride (PF)  3 mL Intracatheter Q8H     insulin aspart  1-7 Units Subcutaneous TID AC     insulin aspart  1-5 Units Subcutaneous At Bedtime     amLODIPine  10 mg Oral Daily     metoprolol  100 mg Oral BID     calcitRIOL  0.25 mcg Oral Daily     cholecalciferol  1,000 Units Oral Daily     fluticasone-vilanterol  1 puff Inhalation Daily     pravastatin  80 mg Oral At Bedtime     ipratropium - albuterol 0.5 mg/2.5 mg/3 mL  3 mL Nebulization 4x daily     polyethylene glycol  17 g Oral BID        Allergies   Allergen Reactions     Lisinopril      Hyperkalemia, elevated renal function          "   Physical Exam:   Vitals were reviewed  Heart Rate: 88, Blood pressure 103/77, pulse 92, temperature 97.6  F (36.4  C), temperature source Axillary, resp. rate 25, height 1.52 m (4' 11.84\"), weight 83.9 kg (184 lb 15.5 oz), SpO2 95 %.    Wt Readings from Last 3 Encounters:   04/22/17 83.9 kg (184 lb 15.5 oz)   01/12/17 83.4 kg (183 lb 12.8 oz)   12/07/16 85.3 kg (188 lb)       Intake/Output Summary (Last 24 hours) at 04/22/17 1447  Last data filed at 04/22/17 1200   Gross per 24 hour   Intake           268.33 ml   Output              925 ml   Net          -656.67 ml       GENERAL APPEARANCE: Looks short of breath  HEENT:  Eyes/ears/nose/neck grossly normal. Looks dry  RESP: fine rales. No wheezes  CV: RRR, nl S1/S2   ABDOMEN: obese, soft, NT  EXTREMITIES/SKIN: no rashes/lesions on observed skin; no edema  Neuro: A/O. Facial is symmetric  Benoit catheter         Data:     CBC RESULTS:     Recent Labs  Lab 04/22/17  1000 04/22/17  0520 04/21/17  0650 04/20/17  0644 04/19/17  0640 04/18/17  0700   WBC 15.6* 13.6* 11.7* 11.3* 12.5* 11.7*   RBC 2.97* 2.88* 3.12* 2.92* 3.02* 3.16*   HGB 8.1* 7.5* 8.3* 7.8* 8.1* 8.4*   HCT 25.7* 24.7* 26.8* 25.4* 25.7* 26.9*    200 203 185 187 147*       Basic Metabolic Panel:    Recent Labs  Lab 04/22/17  1000 04/22/17  0520 04/21/17  0650 04/20/17  0644 04/19/17  0640 04/18/17  0700    142 143 146* 145* 144   POTASSIUM 4.7 4.4 4.3 4.5 4.7 4.7   CHLORIDE 109 109 110* 112* 111* 111*   CO2 20 22 23 25 24 23   BUN 97* 93* 84* 86* 86* 90*   CR 5.03* 4.94* 4.53* 4.18* 3.88* 3.82*   * 174* 124* 244* 130* 116*   RICHARD 8.4* 8.3* 8.6 8.4* 8.6 8.5       INR  Recent Labs  Lab 04/22/17  1000 04/19/17  0855   INR 1.26* 1.26*      Attestation:   I have reviewed today's relevant vital signs, notes, medications, labs and imaging.    Pankaj Valerio MD  [facility] - Nephrology  Office phone :[phone]  Pager: [phone]  "

## 2017-04-22 NOTE — PROGRESS NOTES
[facility] Intensive Care Progress Note    Problem List  Acute hypoxic respiratory failure  Alveolar hemorrhage, etiology unclear  New facial droop, code stroke    Date of Service (when I saw the patient): 04/22/2017     Assessment & Plan   Adina Rodriguez is a 81 year old male who was admitted on 4/12/2017. RLL infiltrate on admission CXR, no helpful micro available. CT on 4/19 shows scattered patchy ground glass opacity. Bronchoscopy on 4/21 showed bloody return.      Neurology:  New facial droop today: code stroke called 4/22  -spoke with Dr Mccabe, plan for ASA only, NO thrombolytics  - if CT head negative, MRI/MRA    Cardiovascular:  New atrial fib: noted this admission  -no anticoagulation because of pulmonary hemorrhage    Pulmonary:        1. Acute hypoxic respiratory failure - seems like he came in with more volume overload picture then developed acute ?infectious process. Looking at older imaging, chronic process like organizing pneumonia seems a little less likely but possible.    bronchoscopy showed neutrophils and hemorrhage, CRP and ESR very high PATRICIA, ANCA neg   Methylprednisolone 250 qday    2. Chronic lung disease, looks like nonspecific fibrosis (not necessarily IPF) - he has bronchiectasis, on Breo. Nonspecific pattern moderate airflow limitation on PFTs. Thought to be possibly related to aspiration or GERD. Swallow eval here normal, could be GERD.   - may benefit from 3% saline nebs and flutter valve as outpatient    FiO2 (%): 70 %  Resp: 23    Renal  JIMMY on CKD no acute indication for HD  Decision to offer/pursue HD not yet made, nephrology is following  - check UA to eval for hematuria in light of pulmonary hemorrhage    ID:         Empiric antibiotic therapy: Zosyn day #9  - azithromycin added 4/20  - await bronch cultures, likely will be negative given antibiotic use  - would stop Zosyn at 10 days  - no risk factors for PJP, cytology pending    Nutrition  Has been  NPO due to high flow oxygen: consider enteral tube placement for feeding  -    Endocrine:  Glucose controlled with sc insulin: continue to monitor with steroids  -    Heme/Onc:  Acute on chronic anemia: multifactorial  - transfuse if hgb below 7    DVT Prophylaxis: Pneumatic Compression Devices  GI Prophylaxis: Not indicated (on PPI at home)    Restraints: Restraints for medical healing needed: NO    Family update by me today: Yes     Tereza Santoyo MD    Time Spent on this Encounter   Billing:  I spent 30 minutes bedside and on the inpatient unit today managing the critical care of Adina Rodriguez in relation to the issues listed in this note.    Main Plans for Today   Continue supportive care  Code stroke, head imaging and neuro    Interval History   New facial numbness and droop this morning. Bronchoscopy tolerated well yesterday.    Physical Exam   Temp: 97.8  F (36.6  C) Temp src: Oral Temp  Min: 97.8  F (36.6  C)  Max: 100.1  F (37.8  C) BP: 102/42   Heart Rate: 112 Resp: 23 SpO2: 92 % O2 Device: High Flow Nasal Cannula (HFNC) Oxygen Delivery: Other (Comments) (40lpm)  Vitals:    04/21/17 0700 04/21/17 1041 04/22/17 0000   Weight: 83.1 kg (183 lb 4.8 oz) 83.1 kg (183 lb 3.2 oz) 83.9 kg (184 lb 15.5 oz)     I/O last 3 completed shifts:  In: 268.33 [P.O.:120]  Out: 1175 [Urine:1175]    Current Vitals:Temp:  [97.8  F (36.6  C)-100.1  F (37.8  C)] 97.8  F (36.6  C)  Heart Rate:  [] 112  Resp:  [16-41] 23  BP: ()/(42-82) 102/42  FiO2 (%):  [60 %-100 %] 70 %  SpO2:  [89 %-100 %] 92 %   Awake, alert and following commands  PERRL and conjugate gaze  HFNC  Lungs inspiratory rales  H-RR w/o murmur  Abdomen-soft, non tender, non distended  Extremities-warm and no edema  Lines: No erythema or discharge at entry site for No invasive lines  Current lines are required for patient management    Medications        methylPREDNISolone  62.5 mg Intravenous Q6H     [START ON 4/23/2017] aspirin  325 mg Oral Daily     midazolam   0.5-1 mg Intravenous Once within 24 hrs     fentaNYL  25-50 mcg Intravenous Once within 24 hrs     azithromycin  500 mg Oral Daily     pantoprazole  40 mg Oral BID AC     nystatin  500,000 Units Swish & Spit 4x Daily     piperacillin-tazobactam  2.25 g Intravenous Q8H     sodium chloride (PF)  3 mL Intracatheter Q8H     insulin aspart  1-7 Units Subcutaneous TID AC     insulin aspart  1-5 Units Subcutaneous At Bedtime     amLODIPine  10 mg Oral Daily     metoprolol  100 mg Oral BID     calcitRIOL  0.25 mcg Oral Daily     cholecalciferol  1,000 Units Oral Daily     fluticasone-vilanterol  1 puff Inhalation Daily     pravastatin  80 mg Oral At Bedtime     ipratropium - albuterol 0.5 mg/2.5 mg/3 mL  3 mL Nebulization 4x daily     polyethylene glycol  17 g Oral BID       Data     Recent Labs  Lab 04/22/17  0520 04/21/17  0650 04/20/17  0644 04/19/17  0855   WBC 13.6* 11.7* 11.3*  --    HGB 7.5* 8.3* 7.8*  --    MCV 86 86 87  --     203 185  --    INR  --   --   --  1.26*    143 146*  --    POTASSIUM 4.4 4.3 4.5  --    CHLORIDE 109 110* 112*  --    CO2 22 23 25  --    BUN 93* 84* 86*  --    CR 4.94* 4.53* 4.18*  --    ANIONGAP 11 10 9  --    RICHARD 8.3* 8.6 8.4*  --    * 124* 244*  --    ALBUMIN 2.4* 2.0*  --   --      No results found for this or any previous visit (from the past 24 hour(s)).    FiO2 (%): 70 %  Resp: 23    Tereza Santoyo MD

## 2017-04-22 NOTE — PLAN OF CARE
Problem: Goal Outcome Summary  Goal: Goal Outcome Summary  SLP-   scheduled for 9am Sunday for swallow evaluation with SLP..

## 2017-04-22 NOTE — CONSULTS
[facility]    Neurology Consultation     Date of Admission:  4/12/2017  Date of Consult (When I saw the patient): 04/22/17    Assessment & Plan   Adina Rodriguez is a 81 year old male who was admitted on 4/12/2017. I was asked to see the patient for left facial symptoms.  Examination currently shows no obvious signs of left facial droop, and he notes through the  that his left sided facial numbness has more or less resolved.  CT of the head unremarkable, no mass or bleed.  Currently with significant respiratory distress, so would recommend for now that we focus on his other medical problems.  Once these are stabilized, MRI and MRA can be pursued.  In the interim, baby aspirin would be reasonable for management.  The new onset atrial fibrillation is concerning, and long term he will likely need anticoagulation, but I am concerned about his anemia in the setting of his other medical problems, so until this is stabilized I would be hesitant to introduce an anti coagulant.    Please do not hesitate to contact me if he has any further focal neurologic deficits      I discussed the above diagnosis, assessment, and further testing with the team.  Total time: 50  Minutes, with more than 50% of the time managing care with the primary team      Dionicio Mccabe MD    Code Status    Full Code        Reason for Consult   Reason for consult: I was asked by Dr. Hein to evaluate this patient for facial droop.      Chief Complaint   Facial droop    History is obtained from the paper chart    History of Present Illness   Adina Rodriguez is a 81 year old male with a complicated hospital course including anemia, chronic kidney disease, pulmonary fibrosis.  He has significantly elevated inflammatory markers as well as leukocytosis  This morning he was complaining of left facial numbness, and there was concern for a posisble left facial droop.  Given concern for possible stroke, he was given aspirin and underwent head  CT which was unremarkable.  There was a plan to  pursue MRI of the brain, but in the interim he has developed increased work of breathing, and there is some concern about how stable he would be for MRI, so for now this is on hold.    Past Medical History   I have reviewed this patient's medical history and updated it with pertinent information if needed.   Past Medical History:   Diagnosis Date     BENIGN HYPERTENSION 2003     Chronic kidney disease     stage 4 kidney failure     Esophageal reflux      Gout 3/31/11    polyarticular gout per joint aspiration at [facility]     Hyperlipidemia LDL goal < 100 04         Internal hemorrhoids with other complication     chronic hemorrhoids, flare frequently     Other specified anemias     presumed mild anemia due to chronic blood loss hemhorroids.     Proteinuria 3/19/12    24 hour protein 1.35 g/24hour     Type 2 diabetes, HbA1c goal < 7% (H)          Prior to Admission Medications   Prior to Admission Medications   Prescriptions Last Dose Informant Patient Reported? Taking?   ASPIRIN 81 MG OR TABS 2017 at am  Yes Yes   Si tab po QD (Once per day)   Vitamin D, Cholecalciferol, 1000 UNITS TABS 2017 at am  Yes Yes   Sig: Take 1 tablet by mouth daily   allopurinol (ZYLOPRIM) 300 MG tablet 4/10/2017 at Unknown time  No Yes   Sig: Take 1 tablet (300 mg) by mouth daily   amLODIPine (NORVASC) 10 MG tablet 2017 at Unknown time  No Yes   Sig: Take 1 tablet (10 mg) by mouth At Bedtime   blood glucose monitoring (NO BRAND SPECIFIED) test strip   No No   Sig: Use to test blood sugar 3 times weekly.   bumetanide (BUMEX) 1 MG tablet 2017 at am  Yes Yes   Sig: Take 1 mg by mouth daily   calcitRIOL (ROCALTROL) 0.25 MCG capsule 2017 at am  Yes Yes   Sig: Take 0.25 mcg by mouth daily   ferrous gluconate (FERGON) 324 (38 FE) MG tablet 2017 at am  No Yes   Sig: Take 1 tablet (325 mg) by mouth every other day   fluticasone-vilanterol (BREO  ELLIPTA) 100-25 MCG/INH oral inhaler 4/12/2017 at am  Yes Yes   Sig: Inhale 1 puff into the lungs daily   losartan (COZAAR) 100 MG tablet 4/12/2017 at am  Yes Yes   Sig: Take 50 mg by mouth daily   omeprazole (PRILOSEC) 20 MG CR capsule 4/12/2017 at am  No Yes   Sig: Take 1 capsule (20 mg) by mouth daily   pravastatin (PRAVACHOL) 80 MG tablet 4/10/2017 at Unknown time  No Yes   Sig: Take 1 tablet (80 mg) by mouth At Bedtime      Facility-Administered Medications: None     Allergies   Allergies   Allergen Reactions     Lisinopril      Hyperkalemia, elevated renal function       Social History   I have reviewed this patient's social history and updated it with pertinent information if needed. Adina Rodriguez  reports that he has never smoked. He has never used smokeless tobacco. He reports that he does not drink alcohol or use illicit drugs.    Family History   I have reviewed this patient's family history and updated it with pertinent information if needed.   Family History   Problem Relation Age of Onset     Family History Negative Mother      D:90 old age     HEART DISEASE Father      D: in war       Review of Systems   The 10 point Review of Systems is negative other than noted in the HPI or here.     Physical Exam   Temp: 97.6  F (36.4  C) Temp src: Axillary BP: 117/83   Heart Rate: 83 Resp: 29 SpO2: 98 % O2 Device: High Flow Nasal Cannula (HFNC) Oxygen Delivery: Other (Comments) (40 lpm)  Vital Signs with Ranges  Temp:  [97.6  F (36.4  C)-100.1  F (37.8  C)] 97.6  F (36.4  C)  Heart Rate:  [] 83  Resp:  [16-41] 29  BP: ()/(42-83) 117/83  FiO2 (%):  [60 %-100 %] 60 %  SpO2:  [89 %-100 %] 98 %  184 lbs 15.46 oz    General Appearance:  Appears uncomfortable, short of breath  Neuro:       Mental Status Exam:    Awake, alert, follows commands.       Cranial Nerves:   CN II-XII intact, no observable facial droop today           Motor:   Moves all extremities equally           Reflexes:  2+, symmetric        Sensory:  Grossly intact in all extremities                   Coordination:   Grossly intact though hard to evaluate thoroughly       Gait:  Deferred   Neck: no nuchal rigidity.   CV: Irregular rhythm      Data   Results for orders placed or performed during the hospital encounter of 04/12/17 (from the past 24 hour(s))   Glucose by meter   Result Value Ref Range    Glucose 148 (H) 70 - 99 mg/dL   Glucose by meter   Result Value Ref Range    Glucose 192 (H) 70 - 99 mg/dL   Renal panel   Result Value Ref Range    Sodium 142 133 - 144 mmol/L    Potassium 4.4 3.4 - 5.3 mmol/L    Chloride 109 94 - 109 mmol/L    Carbon Dioxide 22 20 - 32 mmol/L    Anion Gap 11 3 - 14 mmol/L    Glucose 174 (H) 70 - 99 mg/dL    Urea Nitrogen 93 (H) 7 - 30 mg/dL    Creatinine 4.94 (H) 0.66 - 1.25 mg/dL    GFR Estimate 11 (L) >60 mL/min/1.7m2    GFR Estimate If Black 14 (L) >60 mL/min/1.7m2    Calcium 8.3 (L) 8.5 - 10.1 mg/dL    Phosphorus 5.4 (H) 2.5 - 4.5 mg/dL    Albumin 2.4 (L) 3.4 - 5.0 g/dL   CBC with platelets   Result Value Ref Range    WBC 13.6 (H) 4.0 - 11.0 10e9/L    RBC Count 2.88 (L) 4.4 - 5.9 10e12/L    Hemoglobin 7.5 (L) 13.3 - 17.7 g/dL    Hematocrit 24.7 (L) 40.0 - 53.0 %    MCV 86 78 - 100 fl    MCH 26.0 (L) 26.5 - 33.0 pg    MCHC 30.4 (L) 31.5 - 36.5 g/dL    RDW 16.9 (H) 10.0 - 15.0 %    Platelet Count 200 150 - 450 10e9/L   Glucose by meter   Result Value Ref Range    Glucose 158 (H) 70 - 99 mg/dL   Basic metabolic panel   Result Value Ref Range    Sodium 142 133 - 144 mmol/L    Potassium 4.7 3.4 - 5.3 mmol/L    Chloride 109 94 - 109 mmol/L    Carbon Dioxide 20 20 - 32 mmol/L    Anion Gap 13 3 - 14 mmol/L    Glucose 249 (H) 70 - 99 mg/dL    Urea Nitrogen 97 (H) 7 - 30 mg/dL    Creatinine 5.03 (H) 0.66 - 1.25 mg/dL    GFR Estimate 11 (L) >60 mL/min/1.7m2    GFR Estimate If Black 13 (L) >60 mL/min/1.7m2    Calcium 8.4 (L) 8.5 - 10.1 mg/dL   Troponin I   Result Value Ref Range    Troponin I ES 0.156 (HH) 0.000 - 0.045 ug/L    CBC with platelets differential   Result Value Ref Range    WBC 15.6 (H) 4.0 - 11.0 10e9/L    RBC Count 2.97 (L) 4.4 - 5.9 10e12/L    Hemoglobin 8.1 (L) 13.3 - 17.7 g/dL    Hematocrit 25.7 (L) 40.0 - 53.0 %    MCV 87 78 - 100 fl    MCH 27.3 26.5 - 33.0 pg    MCHC 31.5 31.5 - 36.5 g/dL    RDW 17.2 (H) 10.0 - 15.0 %    Platelet Count 222 150 - 450 10e9/L    Diff Method Automated Method     % Neutrophils 95.7 %    % Lymphocytes 2.8 %    % Monocytes 0.8 %    % Eosinophils 0.0 %    % Basophils 0.1 %    % Immature Granulocytes 0.6 %    Nucleated RBCs 0 0 /100    Absolute Neutrophil 15.0 (H) 1.6 - 8.3 10e9/L    Absolute Lymphocytes 0.4 (L) 0.8 - 5.3 10e9/L    Absolute Monocytes 0.1 0.0 - 1.3 10e9/L    Absolute Eosinophils 0.0 0.0 - 0.7 10e9/L    Absolute Basophils 0.0 0.0 - 0.2 10e9/L    Abs Immature Granulocytes 0.1 0 - 0.4 10e9/L    Absolute Nucleated RBC 0.0    INR   Result Value Ref Range    INR 1.26 (H) 0.86 - 1.14   PTT (AM Draw)   Result Value Ref Range    PTT 42 (H) 22 - 37 sec   CT Head w/o Contrast    Narrative    CT HEAD WITHOUT CONTRAST  4/22/2017 10:19 AM    HISTORY: Code Stroke.    TECHNIQUE: Thin section axial images without contrast. Radiation dose  for this scan was reduced using automated exposure control, adjustment  of the mA and/or kV according to patient size, or iterative  reconstruction technique.    COMPARISON: 8/28/2014.    FINDINGS: No acute intracranial hemorrhage or mass effects. Mild  periventricular and subcortical patchy white matter lucency consistent  with chronic small vessel disease. Ventricular system is normal and  unchanged in size. There are no noncontrast CT findings of acute  infarct. No calvarial abnormality.  Visualized paranasal sinuses are  clear.      Impression    IMPRESSION:   1. No acute intracranial abnormality or change since 8/28/2014.  2. Small vessel disease again noted.   UA with Microscopic reflex to Culture   Result Value Ref Range    Color Urine Yellow      Appearance Urine Cloudy     Glucose Urine Negative NEG mg/dL    Bilirubin Urine Negative NEG    Ketones Urine Negative NEG mg/dL    Specific Gravity Urine 1.016 1.003 - 1.035    Blood Urine Large (A) NEG    pH Urine 5.0 5.0 - 7.0 pH    Protein Albumin Urine 100 (A) NEG mg/dL    Urobilinogen mg/dL 0.0 0.0 - 2.0 mg/dL    Nitrite Urine Negative NEG    Leukocyte Esterase Urine Trace (A) NEG    Source Catheterized Urine     WBC Urine 3 (H) 0 - 2 /HPF    RBC Urine 83 (H) 0 - 2 /HPF    Bacteria Urine Few (A) NEG /HPF    Squamous Epithelial /HPF Urine 1 0 - 1 /HPF    Mucous Urine Present (A) NEG /LPF    Uric Acid Crystals Few (A) NEG /HPF   Glucose by meter   Result Value Ref Range    Glucose 239 (H) 70 - 99 mg/dL           Imaging and procedures:  I personally reviewed these images.

## 2017-04-22 NOTE — PLAN OF CARE
Problem: Goal Outcome Summary  Goal: Goal Outcome Summary  Outcome: Declining  ICU End of Shift Summary.  For vital signs and complete assessments, please see documentation flowsheets.      Pertinent assessments:   used several times today also using  phone. BP lower than baseline, Norvasc d/c. Pt reporting occasional bladder pain, bladder scan done and pt is not retaining. HFNC 70% fi02, with 45 LPM flow.   Major Shift Events:  Troponin elevated. MD aware. EKG reveals AFIB. At times pt appears uncomfortable accessory muscle use, tachypnea and  coughing frequently.   Pt not making urine. Tried Albumin. MRI on hold due to respiratory status. Neuro consulted.  Plan (Upcoming Events):  Monitor troponin. Monitor respiratory status. Plan for HD, see renal note.  Eco ordered for tomorrow.  Discharge/Transfer Needs:  TBD. Pt is critically ill.     Bedside Shift Report Completed :   Bedside Safety Check Completed:

## 2017-04-22 NOTE — PROGRESS NOTES
[facility]  Hospitalist Progress Note  Magdalena Hein MD 04/21/17    Reason for Stay (Diagnosis): respiratory failure         Assessment and Plan:      Summary of Stay:  Adina Rodriguez is a 81 year old male w/ hx of DM2, HTN, HLD, neuropathy, diastolic CHF, gout, and CKD4 admitted on 4/12/2017 with cough and sob.  Has had sob for a month prior to admission.  Also with 15-20 pound weight gain.  Persistent RLL infiltrate on CT which is possibly  fibrosis.  Has was treated with ceftriaxone/azithromycin then zosyn for possible recurrent aspiration pna.  No clear aspiration was noted on SLP evaluation.  Did have elevated BNP and weight is up so was diuresed with good UOP.  Nephrology consulted for worsening renal functions.  Did continue with diuresis for peripheral edema and possibility of pulmonary edema, but despite weight coming down he was not improving from a respiratory standpoint.  A repeat CTshowed bilateral groundglass opacities and infiltrates. 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 He was started on steroids 4/21.  rheumatologic studies were ordered to further assess.  Developed afib 4/20 with controlled rate.  Had some hemoptysis, a bloody nose, and is hemoccult positive.   Patient was transferred to ICU 4/21 for increasing O2 needs.  4/22 patient reported facial numbness and droop code stroke was called. NIH stroke scale score 1. CT head negative for acute bleed      Problem List/Assessment and Plan:     Acute left facial droop and numbness  -- stroke code called  -- neuro deficit limited to facial droop  And numbness  -- asa given  --Ct head negative for active bleed  --Neuro consulted given small deficit and hemoptysis not a lytic candidate per Neuro  -- Cardiac echo with bubble study  -- will consider MRI if CT Negative but hold off as requiring high flow O2  -- swallow chau   -- neuro check  -- pt has new afib + possible vasculitis  As  possible causes for CVA      Acute hypoxemic respiratory failure:  Likely secondary to vasculitis + chronic lung disease. Treated for possible pneumonia  --persistent progressive hypoxemic respiratory failure now on HFNC with high FIO2.  -- Unclear as to the acute cause of his bilateral infiltrates to this point.  He has been on zosyn for multiple days without much improved so ID was consulted and azithromycin added as leukocytosis persistent with some fevers.    -- Positive symptoms of cough and hemoptysis prompted repeat chest CT  that shows bilateral infiltrates throughout both lungs along with some likely fibrosis in lower lobes that is not new.  -- He has bronchiectasis on Breo: ? No aspiration could be related to GERD  -- He did have evidence for volume overload and likely contribution from diastolic CHF in setting of 15-20 lb weight gain last month, however symptoms not improved despite being 15 pounds down from admission and now appears dry.   -- Elevated CRP and sed rate significantly elevated raising question of an acute inflammatory process that may respond to steroids vs an acute infection.    -- Started on steroids and Underwent bronchoscopy 4/21  -- Bronch analysis suggestive of alveolar vasculitis. showed neutrophils and hemorrhage, CRP and ESR very high PATRICIA, ANCA neg  -continue zosyn and azithromycin per ID  -hold diuresis for now as intravascularly dry  -pulmonary consulted  -increased steroids 250mg daily    Possible aspiration pneumonia:  Possibility of aspiration, but none seen on swallow evaluation.  Bilateral infiltrates on repeat CT, however not clear this represents typical infection. procalc was high on 4/20  - will DC zosyn as gram stain of bronch was negative.  -- ID following  -azithromycin since added as well will continue for now    Elevated troponins  -- no chest pain  -- Serial troponins  -- monitor on tele  -- likely demand ischemia  -- check echo for WMA    Acute on chronic  diastolic CHF: grade II diastolic dysfunction on TTE.  BNP elevated at 3055.  Weight up 15-20 pounds over one month pta.   Does have PND and orthopnea.  Troponin elevated initially, but no WMA on TTE and more likely due to tachycardia and CKD.  No chest pain.  Metoprolol has been started.  Weight is down 15 pounds since admission.  Unclear how much more fluid can be removed.  -continue metoprolol 100mg bid for now  -held losartan for rising creatinine  -stopped diuresis 4/20 as appears intravascularly dry  -strict I/Os and daily weights  -continue redmond    New afib: patient has been in sinus tachycardia throughout admission with HR 100s likely due to his hypoxia and respiratory failure.  Developed afib 4/19.  He did not notice a change.  His HR is actually ok in the 90s.  He is on metoprolol already.  With hemoptysis and positive hemooccult he is not an anticoagulation candidate at this time which was discussed with him and family.  -continue telemetry  -monitor electrolytes  -continue metoprolol, can add prn IV metoprolol if needed for rate control    JIMMY on CKD stage IV: creatinine baseline 2.5-2.7 likely.  Continues to have rising creatinine likely in part from over diuresis.  -holding losartan  -- poor Urine out put  -- nephrology following and considering dialysis  -- likely intravascularly depleted   -- albumin today  -- Anti- GBM ordered        Acute on chronic anemia:  Has chronic anemia likely from CKD.  Now with slow drift in hemoglobin down to 7-8 which may be due to acute blood loss.  --hemoptysis for last 3 days.  Has intermittent epistaxis.   -- Report of blood in stool 2 days ago.   Does have some blood streaks with wiping, so more likely hemorrhoids as cause for positive hemoccult.   -- He does endorse reflux and has been off his ppi, unclear as to why.  -- started on bid ppi    Epistaxis:   Likely due to high flow oxygen. ? Vasculitis  -nasal saline gel and spray prn  -afrin bid prn   -humidify  "air    Type II DM: A1c 7.2. Diet controlled.  BG mostly ok here.  -medium dose SSI    HTN:  pta on norvasc 10mg daily, losartan 50mg daily, and bumex 1mg daily.   --swithced to metoprolol this admission.  -continue 100mg bid metoprolol and amlodipine  -continue to hold losartan for jimmy    GERD:  Increased protonix to bid.    Gout:  Allopurinol on hold for JIMMY    DVT Prophylaxis: Pneumatic Compression Devices  Code Status: Full Code.     FEN: renal diet  Discharge Dispo: unclear  Estimated Disch Date / # of Days until Disch: TBD critically ill         Interval History (Subjective):      Code stroke called today, underwent CT head. Bronchoscopy done  4/21 with concern for vasculitis. Concerned about facial numbness and droop. No cp, no palp, no other weakness. Sensations intact  Entire encounter complete with assistance of professional live .                  Physical Exam:      Last Vital Signs:  /42  Pulse 92  Temp 97.8  F (36.6  C) (Oral)  Resp 23  Ht 1.52 m (4' 11.84\")  Wt 83.9 kg (184 lb 15.5 oz)  SpO2 92%  BMI 36.31 kg/m2    Intake/Output Summary (Last 24 hours) at 04/21/17 1635  Last data filed at 04/21/17 1400   Gross per 24 hour   Intake                0 ml   Output             1250 ml   Net            -1250 ml     Constitutional: Awake, NAD  Eyes: sclera white   HEENT: dry mucous membranes, HFNC on + facial droop and left sided weakness  Respiratory:  Bilateral crackles worse at base.  No wheeze  Cardiovascular: irregularly irregular rhythm.  No murmur   GI: non-tender, not distended, bowel sounds present  Skin: no rash or lesions, acyanotic  Musculoskeletal/extremities: 1+ bilateral LE edema  Neurologic: A&O, facial droop and weakness. Normal strength, sensation and reflexes of all extremities  Psychiatric: anxious          Medications:      All current medications were reviewed with changes reflected in problem list.         Data:      All new lab and imaging data was reviewed. "   Labs:    Recent Labs  Lab 04/22/17  0520 04/21/17  0650 04/20/17  0644    143 146*   POTASSIUM 4.4 4.3 4.5   CHLORIDE 109 110* 112*   CO2 22 23 25   ANIONGAP 11 10 9   * 124* 244*   BUN 93* 84* 86*   CR 4.94* 4.53* 4.18*   GFRESTIMATED 11* 13* 14*   GFRESTBLACK 14* 15* 17*   RICHARD 8.3* 8.6 8.4*       Recent Labs  Lab 04/22/17  0520 04/21/17  0650 04/20/17  0644   WBC 13.6* 11.7* 11.3*   HGB 7.5* 8.3* 7.8*   HCT 24.7* 26.8* 25.4*   MCV 86 86 87    203 185   Iron 15  Albumin 2.0  Ferritin 506  Iron binding capacity 186    Imaging:   None today    Magdalena Hein MD

## 2017-04-22 NOTE — PROGRESS NOTES
Patient remains on HFNC 70% 40 LPM, breath sounds are coarse, received scheduled nebs, RT will continue to follow.    Jacinta Irwin  April 22, 2017.5:31 AM

## 2017-04-22 NOTE — PROGRESS NOTES
Respiratory Therapy    Patient seen resting in bed on HFNC 40-45 LPM and FiO2 % or on 15 LPM oxymask, SpO2 91%. Respiratory rate 20-30s and breath sounds clear/diminished. Patient will continue to receive Duoneb QID and Breo QD. RT to follow.    Malorie Cristobal  April 22, 2017, 5:42 PM

## 2017-04-22 NOTE — PROGRESS NOTES
At 09:40. Pt reports left cheek numbness and tight.  was at the bedside. Neuro assessment done at the bedside by RN. Santos Burgos called and Dr. Hein called to bedside and performed a neuro assessment as well. Flyrashad RN started P.I.V. Gave pt aspirin per rectum. Pt was placed on the cart and sent to CT scan at 10:07.

## 2017-04-23 NOTE — PROGRESS NOTES
Critical Care Progress Note      04/23/2017    Name: Adina Rodriguez MRN#: [id number]   Age: 81 year old YOB: 1935     Hsptl Day# 11  ICU DAY #    MV DAY #             Problem List:   Active Problems:    Pneumonia  Septic shock         Summary/Hospital Course:   81 year old male who had several short episodes of cardiac arrest resuscitated with CPR and epinephrine.    Diagnosis:  Acute respiratory failure with septic shock  Chronic kidney disease  Pulmonary hemorrhage        Assessment and plan :     Adina Rodriguez IS a 81 year old male admitted on 4/12/2017 for septic shock  I have personally reviewed the daily labs, imaging studies, cultures and discussed the case with referring physician and consulting physicians.     My assessment and plan by system for this patient is as follows:    Neurology/Psychiatry:   Patient is on a fentanyl drip at 50 mcg per hour  Ativan 2mg IV Q4hour prn  Continue supportive care     Cardiovascular:   On vasopressin 2.4 and norepiinephrine 0.3 mcg/kg/min  Start Epi as necessary  Consider troponin every 8 hours (trend for 24 hours)    Pulmonary/Ventilator Management:   Patient is intubated  CMV rate 24, peep 8, FiO2 100%, .   Continue steroids for pulmonary hemorrhage.  Lung protective strategies.    GI and Nutrition :     Renal/Fluids/Electrolytes:   Continue to monitor input/out  Electrolyte replacement as needed  Given 3 amps of bicarb is view of severe metabolic acidosis  Monitor lactates  Patient has chronic kidney disease      Infectious Disease:    On Zosyn  Pharmacy to dose.      Endocrine:    ICU insulin protocol, goal sugar <180    Hematology/Oncology:   Continue to monitor Hb  Hb 6.8.  Transfuse 2 units of blood     IV/Access:   1. Venous access - right IJ  2. Arterial access - right femoral  Plan  - central access required and necessary      ICU Prophylaxis:   1. DVT: Hep Subq/ LMWH/mechanical  2. VAP: HOB 30 degrees, chlorhexidine rinse  3. Stress Ulcer: PPI/H2  blocker  4. Restraints: Nonviolent soft two point restraints required and necessary for patient safety and continued cares and good effect as patient continues to pull at necessary lines, tubes despite education and distraction. Will readdress daily.   5. IV Access - central access required and necessary for continued patient cares  6. Feeding - NPO  7. Family Update:given   8. Disposition - ICU        Key goals for next 24 hours:   1.Continue pressor support and resuscitation    2. Monitor lactates  3. Start Zosyn   3. Give 2 units of blood             Interim History:              Key Medications:       albumin human  25 g Intravenous Once     calcium chloride  1 g Intravenous Once     piperacillin-tazobactam  3.375 g Intravenous Q6H     methylPREDNISolone  62.5 mg Intravenous Q6H     aspirin  325 mg Oral Daily     propofol         fentaNYL Citrate (PF)         insulin (regular)  10 Units Intravenous Once     fentaNYL Citrate (PF)         azithromycin  500 mg Oral Daily     pantoprazole  40 mg Oral BID AC     nystatin  500,000 Units Swish & Spit 4x Daily     sodium chloride (PF)  3 mL Intracatheter Q8H     insulin aspart  1-7 Units Subcutaneous TID AC     insulin aspart  1-5 Units Subcutaneous At Bedtime     metoprolol  100 mg Oral BID     calcitRIOL  0.25 mcg Oral Daily     cholecalciferol  1,000 Units Oral Daily     fluticasone-vilanterol  1 puff Inhalation Daily     pravastatin  80 mg Oral At Bedtime     ipratropium - albuterol 0.5 mg/2.5 mg/3 mL  3 mL Nebulization 4x daily     polyethylene glycol  17 g Oral BID       fentaNYL       EPINEPHrine IV infusion ADULT 0.03 mcg/kg/min (04/23/17 0017)     NaCl       vasopressin (PITRESSIN) infusion ADULT (40 mL)       norepinephrine 0.3 mcg/kg/min (04/23/17 0018)     vasopressin (PITRESSIN) infusion ADULT (40 mL) 2.4 Units/hr (04/23/17 0019)               Physical Examination:   Temp:  [97.4  F (36.3  C)-98.6  F (37  C)] 97.4  F (36.3  C)  Heart Rate:  []  73  Resp:  [17-41] 23  BP: ()/(42-83) 91/63  FiO2 (%):  [60 %-100 %] 100 %  SpO2:  [86 %-98 %] 92 %    Intake/Output Summary (Last 24 hours) at 04/23/17 0042  Last data filed at 04/22/17 1700   Gross per 24 hour   Intake              620 ml   Output              575 ml   Net               45 ml     Wt Readings from Last 4 Encounters:   04/22/17 83.9 kg (184 lb 15.5 oz)   01/12/17 83.4 kg (183 lb 12.8 oz)   12/07/16 85.3 kg (188 lb)   08/30/16 83.9 kg (185 lb)     BP - Mean:  [70-89] 73  Ventilation Mode: CMV/AC  FiO2 (%): 100 %  Rate Set (breaths/minute): 24 breaths/min  Tidal Volume Set (mL): 480 mL  PEEP (cm H2O): 8 cmH2O  Oxygen Concentration (%): 100 %  Resp: 23    Recent Labs  Lab 04/22/17  2345 04/22/17  2051 04/19/17  0640   PH 6.96* 7.35  --    PCO2 59* 32*  --    PO2 67* 57*  --    HCO3 13* 18*  --    O2PER 100%Ventilator 65%HIGH FLOW NC 3       GEN: no acute distress   HEENT: head ncat, sclera anicteric, OP patent, trachea midline   PULM: unlabored synchronous with vent, clear anteriorly    CV/COR: RRR S1S2 no gallop,  No rub, no murmur  ABD: soft nontender, hypoactive bowel sounds, no mass  EXT:  Edema   warm  NEURO: grossly intact  SKIN: no obvious rash  LINES: clean, dry intact         Data:   All data and imaging reviewed     ROUTINE ICU LABS (Last four results)  CMP  Recent Labs  Lab 04/22/17  2154 04/22/17  1000 04/22/17  0520 04/21/17  0650  04/17/17  0617    142 142 143  < > 144   POTASSIUM 5.7* 4.7 4.4 4.3  < > 4.8   CHLORIDE 107 109 109 110*  < > 111*   CO2 13* 20 22 23  < > 22   ANIONGAP 20* 13 11 10  < > 11   * 249* 174* 124*  < > 130*   * 97* 93* 84*  < > 87*   CR 6.40* 5.03* 4.94* 4.53*  < > 3.49*   GFRESTIMATED 8* 11* 11* 13*  < > 17*   GFRESTBLACK 10* 13* 14* 15*  < > 21*   RICHARD 7.8* 8.4* 8.3* 8.6  < > 9.2   PHOS  --   --  5.4*  --   --  5.4*   ALBUMIN  --   --  2.4* 2.0*  --   --    < > = values in this interval not displayed.  CBC  Recent Labs  Lab 04/22/17  1250  04/22/17  1000 04/22/17  0520 04/21/17  0650   WBC 19.1* 15.6* 13.6* 11.7*   RBC 2.90* 2.97* 2.88* 3.12*   HGB 7.8* 8.1* 7.5* 8.3*   HCT 24.9* 25.7* 24.7* 26.8*   MCV 86 87 86 86   MCH 26.9 27.3 26.0* 26.6   MCHC 31.3* 31.5 30.4* 31.0*   RDW 17.2* 17.2* 16.9* 17.4*    222 200 203     INR  Recent Labs  Lab 04/22/17  1000 04/19/17  0855   INR 1.26* 1.26*     Arterial Blood Gas  Recent Labs  Lab 04/22/17  2345 04/22/17  2051 04/19/17  0640   PH 6.96* 7.35  --    PCO2 59* 32*  --    PO2 67* 57*  --    HCO3 13* 18*  --    O2PER 100%Ventilator 65%HIGH FLOW NC 3       All cultures:    Recent Labs  Lab 04/22/17  0903 04/21/17  1210 04/20/17  1018 04/19/17  1550   CULT >10 Squamous epithelial cells/low power field indicates oral contamination. Please recollect.Canceled, Test creditedNotification of test cancellation was given to Horace Lund RN [facility], @5599 4/22/17. DH.* Culture negative < 24 hours, reincubate Canceled, Test credited>10 Squamous epithelial cells/low power field indicates oral contamination. Please recollect.Notification of test cancellation was given to Malia Martinez Rn @ 9500 4/20/17 CS* >10 Squamous epithelial cells/low power field indicates oral contamination. Please recollect.Canceled, Test creditedNotification of test cancellation was given to Ursula Jennings RN @20:35 on 4/19/17,KO*     Recent Results (from the past 24 hour(s))   CT Head w/o Contrast    Narrative    CT HEAD WITHOUT CONTRAST  4/22/2017 10:19 AM    HISTORY: Code Stroke.    TECHNIQUE: Thin section axial images without contrast. Radiation dose  for this scan was reduced using automated exposure control, adjustment  of the mA and/or kV according to patient size, or iterative  reconstruction technique.    COMPARISON: 8/28/2014.    FINDINGS: No acute intracranial hemorrhage or mass effects. Mild  periventricular and subcortical patchy white matter lucency consistent  with chronic small vessel disease. Ventricular system is normal  and  unchanged in size. There are no noncontrast CT findings of acute  infarct. No calvarial abnormality.  Visualized paranasal sinuses are  clear.      Impression    IMPRESSION:   1. No acute intracranial abnormality or change since 8/28/2014.  2. Small vessel disease again noted.    NATALIE PLATA MD   XR Chest Port 1 View    Narrative    CHEST PORTABLE ONE VIEW  4/22/2017 10:07 PM     COMPARISON: Frontal chest x-ray 4/15/2017.    HISTORY: Endotracheal tube positioning.      Impression    IMPRESSION: There has been interval placement of an endotracheal tube  with its tip approximately 2 cm above the maría. There is patchy  airspace opacity in the lower lungs on both sides (right worse than  left) that may represent pneumonia. There is increased interstitial  prominence in both lungs that may represent pulmonary edema. There is  no pneumothorax. There are probable tiny bilateral pleural effusions.  Heart size appears within normal limits.    BEN CURRY MD   XR Chest Port 1 View    Narrative    XR CHEST PORT 1 VW  4/22/2017 10:30 PM      HISTORY: Respiratory arrest, just intubated.     COMPARISON: 4/22/2017.    FINDINGS: Supine portable chest. ET tube in place with tip 2 cm above  the maría. No pneumothorax. There are increasing bilateral pulmonary  infiltrates which may be pulmonary edema. Superimposed pneumonia,  particularly in the right, is a possibility.      Impression    IMPRESSION: Increasing pulmonary infiltrates.    MONI DEL TORO MD         Billing: This patient is critically ill: Yes. Total critical care time today 54 min.

## 2017-04-23 NOTE — PROGRESS NOTES
[facility]  Hospitalist Progress Note  Magdalena Hein MD 04/21/17    Reason for Stay (Diagnosis): respiratory failure         Assessment and Plan:      Summary of Stay:  Adina Rodriguez is a 81 year old male w/ hx of DM2, HTN, HLD, neuropathy, diastolic CHF, gout, and CKD4 admitted on 4/12/2017 with cough and sob.  Has had sob for a month prior to admission.  Also with 15-20 pound weight gain.  Persistent RLL infiltrate on CT which is possibly  fibrosis.  Has was treated with ceftriaxone/azithromycin then zosyn for possible recurrent aspiration pna.  No clear aspiration was noted on SLP evaluation.  Did have elevated BNP and weight is up so was diuresed with good UOP.  Nephrology consulted for worsening renal functions.  Did continue with diuresis for peripheral edema and possibility of pulmonary edema, but despite weight coming down he was not improving from a respiratory standpoint.  A repeat CTshowed bilateral groundglass opacities and infiltrates. 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 He was started on steroids 4/21.  rheumatologic studies were ordered to further assess.  Developed afib 4/20 with controlled rate.  Had some hemoptysis, a bloody nose, and is hemoccult positive.   4/20  New diagnosis of  afib  4/21Patient was transferred to ICU 4/21 for increasing O2 needs.  4/21: Bronchoscopy showed alveolar hemorrhage of unclear etiology  4/22 patient reported facial numbness and droop code stroke was called. NIH stroke scale score 1. CT head negative for acute bleed  4/22 Acute hypoxic respiratory failure s/p cardiac arrest X2  4/22 patient intubated, s/p blood transfusion with hgb < 6  4/22 CT abd for abd distension and drop in hgb      Problem List/Assessment and Plan:     Code blue with respiratory arrest. Acute hypoxic respiratory failure  --S/p cardiac arrest on 4/22  -- Likely progression and worsening of hypoxemia  -- patient intubated   --  now on three pressors  -- sedated      Acute left facial droop and numbness  -- stroke code called 4/22  -- neuro deficit limited to facial droop  And numbness  -- asa given  --Ct head negative for active bleed  --Neuro consulted given small deficit and hemoptysis not a lytic candidate per Neuro  -- Cardiac echo with bubble study  -- will consider MRI if CT Negative but hold off as requiring high flow O2  -- swallow chau   -- neuro check  -- pt has new afib + possible vasculitis  As possible causes for CVA  --spoke with Dr Mccabe, plan for ASA only, NO thrombolytics especially with current critical statuus      Acute hypoxemic respiratory failure:  Likely secondary to vasculitis + chronic lung disease. Treated for possible pneumonia  --persistent progressive hypoxemic respiratory failure now on HFNC with high FIO2.  -- Unclear as to the acute cause of his bilateral infiltrates to this point.  He has been on zosyn for multiple days without much improved so ID was consulted and azithromycin added as leukocytosis persistent with some fevers.    -- Positive symptoms of cough and hemoptysis prompted repeat chest CT  that shows bilateral infiltrates throughout both lungs along with some likely fibrosis in lower lobes that is not new.  -- He has bronchiectasis on Breo: ? No aspiration could be related to GERD  -- He did have evidence for volume overload and likely contribution from diastolic CHF in setting of 15-20 lb weight gain last month, however symptoms not improved despite being 15 pounds down from admission and now appears dry.   -- Elevated CRP and sed rate significantly elevated raising question of an acute inflammatory process that may respond to steroids vs an acute infection.    -- Started on steroids and Underwent bronchoscopy 4/21  -- Bronch analysis suggestive of alveolar vasculitis. showed neutrophils and hemorrhage, CRP and ESR very high PATRICIA, ANCA neg  -continue zosyn and azithromycin per ID  -hold  diuresis for now as intravascularly dry  -pulmonary consulted  -increased steroids 250mg daily  -Possible aspiration pneumonia:  Possibility of aspiration, but none seen on swallow evaluation.  Bilateral infiltrates on repeat CT, however not clear this represents typical infection. procalc was high on 4/20  - will DC zosyn as gram stain of bronch was negative.  -- ID following  -azithromycin since added as well will continue for now    Elevated troponins  -- no chest pain initially  s/p cardiac arrest later  -- Serial troponins trending up post CPR  -- monitor on tele  -- likely demand ischemia  -- check echo for WMA    Acute on chronic diastolic CHF: grade II diastolic dysfunction on TTE.  BNP elevated at 3055.  Weight up 15-20 pounds over one month pta.   Does have PND and orthopnea.  Troponin elevated initially, but no WMA on TTE and more likely due to tachycardia and CKD.  No chest pain.  Metoprolol has been started.  Weight is down 15 pounds since admission.  Unclear how much more fluid can be removed.  -continue metoprolol 100mg bid for now  -held losartan for rising creatinine  -stopped diuresis 4/20 as appears intravascularly dry   -- is on albumin  -strict I/Os and daily weights  -continue redmond    New afib: patient has been in sinus tachycardia throughout admission with HR 100s likely due to his hypoxia and respiratory failure.  Developed afib 4/19.  He did not notice a change.  His HR is actually ok in the 90s.  He is on metoprolol already.  With hemoptysis and positive hemooccult he is not an anticoagulation candidate at this time which was discussed with him and family.  -continue telemetry  -monitor electrolytes  -continue metoprolol, can add prn IV metoprolol if needed for rate control    Abdominal Pain and drop in HGB  -- CT Abd shows concern for cholecystitis  -- will start meropenem  -- will transfuse hgb and follow closely    JIMMY on CKD stage IV: creatinine baseline 2.5-2.7 likely.  Continues to  have rising creatinine likely in part from over diuresis.  -holding losartan  -- poor Urine out put  -- nephrology following and considering dialysis  -- likely intravascularly depleted   -- albumin today  -- Anti- GBM ordered        Acute on chronic anemia:  Has chronic anemia likely from CKD.  Now with slow drift in hemoglobin down to 7-8 which may be due to acute blood loss.  --hemoptysis and  Has intermittent epistaxis.   -- hgb down overnight 4/22 received 2 units of PRBCs  -- repeat lab after 2 units still low  Will transfuse another unit  -- abd distended will check CT abd for Bleeding .  -- ? Report of bleeding from ET tube  -- Cannot exclude GI Bleed Report of blood in stool few days ago.   Does have some blood streaks with wiping, so more likely hemorrhoids as cause for positive hemoccult.   -- He does endorse reflux and has been off his ppi, unclear as to why.  -- was on bid ppi. Will start IV PPI    Epistaxis:   Likely due to high flow oxygen. ? Vasculitis  -nasal saline gel and spray prn  -afrin bid prn   -humidify air    Type II DM: A1c 7.2. Diet controlled.  BG mostly ok here.  -medium dose SSI    HTN: On admission. Now hypotensive in shock and 3 pressures  -- pta was on norvasc 10mg daily, losartan 50mg daily, and bumex 1mg daily.   --swithced to metoprolol this admission but on hold now  -continue 100mg bid metoprolol and amlodipine  -continue to hold losartan for jimmy    GERD:  On PPI    Gout:  Allopurinol on hold for JIMMY    DVT Prophylaxis: Pneumatic Compression Devices  Code Status: Full Code.   For now. In  Last few days several discussions with family and patient has been documented. Prior to bronch pt had expressed that he would not want to go on like this but would want his family's input.  Family here and his children will come in later today. Will need to readdress care plan.   FEN: renal diet  Discharge Dispo: unclear  Estimated Disch Date / # of Days until Disch: TBD critically ill  "    ADDENDUM:  Code status DNR.  Had detailed discussion with family regarding prognosis and status. Family would like to buy some time for their monk to come and help with prayers which will be in a day or two.  Would like to kep him alive until then if possible.   However if he codes will not resuscitate  And make him DNR. Order entered after detailed discussion with patient and family          Interval History (Subjective):      Patient had respiratory arrest X 2 overnight and earlier stroke code. Pt was intubated last night and on multiple pressors. Sedated, has low hgb received 2 units of PRBCs, HGB still lower. Unable to obtain detailed ROS          Physical Exam:      Last Vital Signs:  /60  Pulse 92  Temp 98  F (36.7  C) (Axillary)  Resp 30  Ht 1.52 m (4' 11.84\")  Wt 91.9 kg (202 lb 9.6 oz)  SpO2 93%  BMI 39.78 kg/m2    Intake/Output Summary (Last 24 hours) at 04/21/17 1635  Last data filed at 04/21/17 1400   Gross per 24 hour   Intake                0 ml   Output             1250 ml   Net            -1250 ml     Constitutional: INTUBATED SEDATED  Eyes: sclera white   HEENT: dry mucous membranes, HFNC on + facial droop and left sided weakness  Respiratory:  Bilateral crackles worse at base.  No wheeze  Cardiovascular: irregularly irregular rhythm.  No murmur   GI: distended decreased BS  Skin: no rash or lesions, acyanotic  Musculoskeletal/extremities: 1+ bilateral LE edema  Neurologic: A&O, facial droop and weakness. Normal strength, sensation and reflexes of all extremities  Psychiatric: sedated         Medications:      All current medications were reviewed with changes reflected in problem list.         Data:      All new lab and imaging data was reviewed.   Labs:    Recent Labs  Lab 04/23/17  0930 04/23/17  0555 04/23/17  0025   * 144 146*   POTASSIUM 4.2 5.1 5.3   CHLORIDE 117* 108 109   CO2 14* 15* 19*   ANIONGAP 17* 21* 18*   * 272* 208*   BUN 98* 111* 111*   CR 5.64* 6.49* " 6.49*   GFRESTIMATED 10* 8* 8*   GFRESTBLACK 12* 10* 10*   RICHARD 6.2* 7.9* 9.3       Recent Labs  Lab 04/23/17  0942 04/23/17  0555 04/23/17  0025 04/22/17  2154   WBC  --  21.0* 19.6* 19.1*   HGB 6.7* 7.1* 6.1* 7.8*   HCT  --  22.4* 19.5* 24.9*   MCV  --  84 85 86   PLT  --  174 187 225   Iron 15  Albumin 2.0  Ferritin 506  Iron binding capacity 186    Imaging:   None today    Magdalena Hein MD

## 2017-04-23 NOTE — PROGRESS NOTES
Respiratory Therapy    Patient seen resting in bed on mechanical ventilator settings:    Ventilation Mode: CMV/AC  FiO2 (%): 90 %  Rate Set (breaths/minute): 24 breaths/min  Tidal Volume Set (mL): 400 mL  PEEP (cm H2O): 10 cmH2O  Oxygen Concentration (%): 90 %  Resp: 20    Respiratory rate 29-30, breath sounds coarse, and SpO2 96%. Suctioning small, nico blood from ETT. Patient will continue to receive Duoneb QID and continuous Flolan nebulization. RT to follow.    Malorie Cristobal  April 23, 2017, 5:05 PM

## 2017-04-23 NOTE — PLAN OF CARE
Problem: Goal Outcome Summary  Goal: Goal Outcome Summary  Outcome: Declining  ICU End of Shift Summary.  For vital signs and complete assessments, please see documentation flowsheets.      Pertinent assessments:  On 90% fi02 with peep of 10. In/out of A fib hrt rate ranges 's. Pt not following commands but grimaces with turns and cares.  Major Shift Events: CT abdomen done, US of abdomen done. Transfused 1 unit of PRBC,( pt got 2 units overnight). Bloody sputum from ETT. No other source of bleeding noted.  Started flolan, propofol,  insulin on alg 2 and sodium bicarb gtt. Pt is anuric. Bilateral soft wrist restraints removed. Pt is not moving extremities, Bilateral mitts placed instead. Monks did visit and pray with the family and pt.   Plan (Upcoming Events): Transfuse if Hemoglobin <7. Dialysis catheter placement planned for tomorrow, however if pt unstable the plan is to transfer for CRRT. MAP>65. Monitoring labs.   Discharge/Transfer Needs:  TBD, pt is critically ill. MD's discussed pt status with multiple family members.  was at bedside at the time of discussion. Pt is DNR.     Bedside Shift Report Completed :   Bedside Safety Check Completed:

## 2017-04-23 NOTE — CODE/RAPID RESPONSE
-Code blue-    Code blue was called at 9:36pm for pulseless arrest.  Arrived at room within 2 minutes.  CPR was already underway.  Patient is known to me from yesterday and the previous week.  RN report is that patient was having difficulty breathing leading to LOC, bradycardia, and loss of pulses.  CPR for approximately 2-3 minutes then patient opened eyes and moved.  He had pulses at that time.  He could open them to stimulation and look towards someone talking to him, however could not speak or follow commands despite being instructed by a cambodian speaking nurse which is his native language.  He then had further LOC decision made to intubate the patient which was done by anesthesia.  Stat labs sent.  Stat chest Xray obtained and showed a twisted trachea and the ET tube looked a little deep, but not mainstemed.  As this was happening he again lost pulses and CPR resumed.  Code blue again called to gather additional people.  He was given 1mg epinephrine following 2 min CPR.  After additional 2mn CPR he had a weak pulse and his HR appeared to be wide complex vs bundle branch block at a rate of 160s.  During this time NS was going in on a pressure bag.  He was defibrillated once and received additional 2 min CPR.  Pulses were present again and rate slowed.  Labs returned showing lactate of >7.  He was given an amp of bicarb.  BP dropping so prepared norepinephrine gtt.  One other additional 2 min CPR and 1mg epinephrine required for loss of pulses.  Norepinephrine started through PIV as no central line present.  During this time intensivist isreal was requested to come in after discussion with tele ICU physician.  ET tube withdrawn 1 cm and xray obtained.  Appear to have bilateral opacities as before.  Do not see any pneumothorax on quick read.  Norepinephrine maxed out and vasopressin started.  Also received dose of atropine for HR drifting to 50 which helped briefly.  Additional amp of bicarb given for acidosis in  case of contributing.  His K came back at 5.7 so calcium chloride, 10 U IV insulin, and 1 amp D50 given to shift patient and stabilize membranes.  Intensivist isreal arrived and was given update.  Plan to place central and arterial lines.  Stat EKG obtained.  On my bedside cardiac US I noted fair appearing systolic function of the LV, the RV was not significantly dilated, there was no pericardial effusion, the IVC was 1.9 cm and did not vary with respiration.  Will be obtaining TTE today.    Family arrived and I discussed with them that the patient is in a very difficult spot with having lost pulses 3 times, his worsening kidney function, and acute lung process requiring maximal support.  I informed them that we are providing him with maximal support including 2 vasopressors and mechanical ventilation.  They request that all restorative measures continue for now.  I did ask that they continue to discuss his code status and overall care plan if the patient continues to lose pulses or there are worsening issues with hypotension or hypoxemia despite maximal support.      (Over 90 minutes spent at bedside providing critical care)

## 2017-04-23 NOTE — PROGRESS NOTES
Patient intubated with 8.0 ETT 23 @ Lip, appropriate color change from ETC02, Bilaterally breath sounds present, Placed on CMV 24/480/+8/100% per MD, ABG drawn from arterial line, EKG collected, RT will continue to follow.    Jacinta Irwin  April 23, 2017.12:19 AM

## 2017-04-23 NOTE — PROGRESS NOTES
SPIRITUAL HEALTH SERVICES Progress Note  [facility]     Responded to staff request for a  to come and visit pt. [facility] provided the family with the phone number of a [employer] in [place], [place]. [facility] will continue to be available per patient/family/staff request.    Dionicio Snyder  Chaplain Resident  Pager [phone]

## 2017-04-23 NOTE — PROGRESS NOTES
[facility] Intensive Care Progress Note    Problem List  Acute hypoxic respiratory failure  S/p cardiac arrest on 4/22  Alveolar hemorrhage, etiology unclear  New facial droop, code stroke    Date of Service (when I saw the patient): 04/23/2017     Assessment & Plan   Adina Rodriguez is a 81 year old male who was admitted on 4/12/2017. RLL infiltrate on admission CXR, no helpful micro available. CT on 4/19 shows scattered patchy ground glass opacity. Bronchoscopy on 4/21 showed bloody return.      Neurology:  New facial droop today: code stroke called 4/22  -spoke with Dr Mccabe, plan for ASA only, NO thrombolytics  - if CT head negative, MRI/MRA intended, could not be performed on HFNC yesterday  - hold off now, consider performing to assist prognosis    Cardiovascular:  Cardiac arrest likely secondary to hypoxemia but now in shock on 3 pressors: echo today pending, CV 24-30  New atrial fib: noted this admission  -no anticoagulation because of pulmonary hemorrhage  - currently on norepi, vaso, epi  - add flolan inhaled to assist with RV strain  - reduce IV input, he is volume overloaded    Pulmonary:        Respiratory acidosis - barely compensated with vent rate at 30  Acute hypoxic respiratory failure -    bronchoscopy showed neutrophils and hemorrhage, CRP and ESR very high PATRICIA, ANCA neg   Methylprednisolone 250 qday (day #3 today)    Chronic lung disease, looks like nonspecific fibrosis (not necessarily IPF) - he has bronchiectasis, on Breo. Nonspecific pattern moderate airflow limitation on PFTs. Thought to be possibly related to aspiration or GERD. Swallow eval here normal, could be GERD.   - may benefit from 3% saline nebs and flutter valve as outpatient    Ventilation Mode: CMV/AC  FiO2 (%): 100 %  Rate Set (breaths/minute): 30 breaths/min  Tidal Volume Set (mL): 480 mL  PEEP (cm H2O): 8 cmH2O  Oxygen Concentration (%): 100 %  Resp: 30    Renal  JIMMY on CKD acute metabolic  acidosis   Decision to offer/pursue HD not yet made, nephrology is following  - check UA to eval for hematuria in light of pulmonary hemorrhage  - I am not inclined to offer HD based on chronic kidney and heart disease, will discuss with Dr Long, await echo and abdominal CT; may need CRRT if renal replacement initiated    ID:         Empiric antibiotic therapy: Zosyn day #10  - azithromycin added 4/20  - await bronch cultures, likely will be negative given antibiotic use  - would stop Zosyn at 10 days (today)  - no risk factors for PJP, cytology pending    GI  Has complained of lower abdominal pain: increasing abdominal distention today  - CT abdomen to eval for intraabdominal hemorrhage or bladder pathology     Nutrition  Has been NPO due to high flow oxygen: enteral tube placement for feeding  - initiate enteral feeding after CT abdomen if no indication to be NPO    Endocrine:  Glucose controlled with sc insulin: continue to monitor with steroids  -    Heme/Onc:  Acute on chronic anemia: multifactorial  - transfused 2 PRBC    DVT Prophylaxis: Pneumatic Compression Devices  GI Prophylaxis: Not indicated (on PPI at home)    Restraints: Restraints for medical healing needed: NO    Family update by me today: Yes     Tereza Santoyo MD    Time Spent on this Encounter   Billing:  I spent 30 minutes bedside and on the inpatient unit today managing the critical care of Adina Rodriguez in relation to the issues listed in this note.    Main Plans for Today   CT abdomen  initiate inhaled Flolan  Family discussion re goals of care    Interval History   Cardiac arrest overnight, intubated.     Physical Exam   Temp: 98  F (36.7  C) Temp src: Axillary Temp  Min: 96.6  F (35.9  C)  Max: 98  F (36.7  C) BP: 126/60   Heart Rate: 81 Resp: 30 SpO2: 93 % O2 Device: Mechanical Ventilator Oxygen Delivery: Other (Comments) (40 LPM)  Vitals:    04/21/17 1041 04/22/17 0000 04/23/17 0000   Weight: 83.1 kg (183 lb 3.2 oz) 83.9 kg (184 lb 15.5 oz)  91.9 kg (202 lb 9.6 oz)     I/O last 3 completed shifts:  In: 1877.21 [P.O.:620; I.V.:957.21]  Out: 175 [Urine:175]    Current Vitals:Temp:  [96.6  F (35.9  C)-98  F (36.7  C)] 98  F (36.7  C)  Heart Rate:  [] 81  Resp:  [16-41] 30  BP: ()/(55-99) 126/60  MAP:  [68 mmHg-95 mmHg] 74 mmHg  Arterial Line BP: ()/(49-70) 119/50  FiO2 (%):  [60 %-100 %] 100 %  SpO2:  [82 %-98 %] 93 %   Awake, alert and following commands  PERRL and conjugate gaze  HFNC  Lungs inspiratory rales  H-RR w/o murmur  Abdomen-soft, non tender, non distended  Extremities-warm and no edema  Lines: No erythema or discharge at entry site for No invasive lines  Current lines are required for patient management    Medications     fentaNYL 50 mcg/hr (04/23/17 0600)     EPINEPHrine IV infusion ADULT 0.03 mcg/kg/min (04/23/17 0600)     norepinephrine 0.27 mcg/kg/min (04/23/17 0946)     vasopressin (PITRESSIN) infusion ADULT (40 mL) 2.4 Units/hr (04/23/17 0946)       calcium chloride  1 g Intravenous Once     piperacillin-tazobactam  2.25 g Intravenous Q6H     insulin aspart  1-7 Units Subcutaneous Q4H     methylPREDNISolone  62.5 mg Intravenous Q6H     aspirin  325 mg Oral Daily     propofol         fentaNYL Citrate (PF)         insulin (regular)  10 Units Intravenous Once     azithromycin  500 mg Oral Daily     pantoprazole  40 mg Oral BID AC     nystatin  500,000 Units Swish & Spit 4x Daily     sodium chloride (PF)  3 mL Intracatheter Q8H     metoprolol  100 mg Oral BID     calcitRIOL  0.25 mcg Oral Daily     cholecalciferol  1,000 Units Oral Daily     fluticasone-vilanterol  1 puff Inhalation Daily     pravastatin  80 mg Oral At Bedtime     ipratropium - albuterol 0.5 mg/2.5 mg/3 mL  3 mL Nebulization 4x daily     polyethylene glycol  17 g Oral BID       Data     Recent Labs  Lab 04/23/17  0555 04/23/17  0025 04/22/17  2154 04/22/17  1817  04/22/17  1000 04/22/17  0520  04/19/17  0855   WBC 21.0* 19.6* 19.1*  --   --  15.6* 13.6*  <  >  --    HGB 7.1* 6.1* 7.8*  --   --  8.1* 7.5*  < >  --    MCV 84 85 86  --   --  87 86  < >  --     187 225  --   --  222 200  < >  --    INR  --   --   --   --   --  1.26*  --   --  1.26*    146* 140  --   --  142 142  < >  --    POTASSIUM 5.1 5.3 5.7*  --   --  4.7 4.4  < >  --    CHLORIDE 108 109 107  --   --  109 109  < >  --    CO2 15* 19* 13*  --   --  20 22  < >  --    * 111* 114*  --   --  97* 93*  < >  --    CR 6.49* 6.49* 6.40*  --   --  5.03* 4.94*  < >  --    ANIONGAP 21* 18* 20*  --   --  13 11  < >  --    RICHARD 7.9* 9.3 7.8*  --   --  8.4* 8.3*  < >  --    * 208* 194*  --   --  249* 174*  < >  --    ALBUMIN 3.2*  --   --   --   --   --  2.4*  < >  --    PROTTOTAL 7.4  --   --   --   --   --   --   --   --    BILITOTAL 1.6*  --   --   --   --   --   --   --   --    ALKPHOS 165*  --   --   --   --   --   --   --   --    *  --   --   --   --   --   --   --   --    AST 4108*  --   --   --   --   --   --   --   --    TROPI 0.524*  --  0.224* 0.223*  < > 0.156*  --   --   --    < > = values in this interval not displayed.  Recent Results (from the past 24 hour(s))   CT Head w/o Contrast    Narrative    CT HEAD WITHOUT CONTRAST  4/22/2017 10:19 AM    HISTORY: Code Stroke.    TECHNIQUE: Thin section axial images without contrast. Radiation dose  for this scan was reduced using automated exposure control, adjustment  of the mA and/or kV according to patient size, or iterative  reconstruction technique.    COMPARISON: 8/28/2014.    FINDINGS: No acute intracranial hemorrhage or mass effects. Mild  periventricular and subcortical patchy white matter lucency consistent  with chronic small vessel disease. Ventricular system is normal and  unchanged in size. There are no noncontrast CT findings of acute  infarct. No calvarial abnormality.  Visualized paranasal sinuses are  clear.      Impression    IMPRESSION:   1. No acute intracranial abnormality or change since 8/28/2014.  2.  Small vessel disease again noted.    NATALIE PLATA MD   XR Chest Port 1 View    Narrative    CHEST PORTABLE ONE VIEW  4/22/2017 10:07 PM     COMPARISON: Frontal chest x-ray 4/15/2017.    HISTORY: Endotracheal tube positioning.      Impression    IMPRESSION: There has been interval placement of an endotracheal tube  with its tip approximately 2 cm above the maría. There is patchy  airspace opacity in the lower lungs on both sides (right worse than  left) that may represent pneumonia. There is increased interstitial  prominence in both lungs that may represent pulmonary edema. There is  no pneumothorax. There are probable tiny bilateral pleural effusions.  Heart size appears within normal limits.    BEN CURRY MD   XR Chest Port 1 View    Narrative    XR CHEST PORT 1 VW  4/22/2017 10:30 PM      HISTORY: Respiratory arrest, just intubated.     COMPARISON: 4/22/2017.    FINDINGS: Supine portable chest. ET tube in place with tip 2 cm above  the maría. No pneumothorax. There are increasing bilateral pulmonary  infiltrates which may be pulmonary edema. Superimposed pneumonia,  particularly in the right, is a possibility.      Impression    IMPRESSION: Increasing pulmonary infiltrates.    MONI DEL TORO MD       Ventilation Mode: CMV/AC  FiO2 (%): 100 %  Rate Set (breaths/minute): 30 breaths/min  Tidal Volume Set (mL): 480 mL  PEEP (cm H2O): 8 cmH2O  Oxygen Concentration (%): 100 %  Resp: 30    Tereza Santoyo MD

## 2017-04-23 NOTE — PROGRESS NOTES
Nurse called tele ICU stating patient might need intubation, we cameraed in immediately, patient became unreponsive and lost pulse. CPR started, code called, hospitalist in the room.

## 2017-04-23 NOTE — PROGRESS NOTES
[facility]  Infectious Disease Progress Note          Assessment and Plan:   IMPRESSION:   1. An 81-year-old [place]ian immigrant male with subacute worsening respiratory symptoms, possible new right lower lobe infiltrate, overall doubt this is conventional infection, . If infection, concern for more unusual or opportunistic infection.   2. Pulmonary fibrosis, recent diagnosis, some interstitial changes, question chronic infection versus other cause, no pathology, no high-dose steroids.   3. Diabetes mellitus.   4. Congestive heart failure.   5. Acute on chronic renal insufficiency. Worsening  6. New facial droop on 4/22: stroke code, pending imaging, per my exam the droop  is very mild on the left.   7. Coded last night with PEA, intubated      RECOMMENDATIONS:   1. Continue Zosyn and  Zithromax for aspiration/conventional coverage.   2.  Inflamm markers high, progressive interstial process, relatives and i nterpretor so more hx, I think after this hx even more unlikely this is a conventional infection,Bronch done and so far no positive microbiological data    3. Now with possible stroke, cardiac arrest and respiratory arrest              Interval History:   Coded last night with PEA, resuscitated, intubated now new abdominal distention concern for GI bleed              Medications:       calcium chloride  1 g Intravenous Once     insulin aspart  1-7 Units Subcutaneous Q4H     epoprostenol (FLOLAN) syringe change   Inhalation Q8H     meropenem  500 mg Intravenous Q12H     azithromycin  500 mg Intravenous Q24H     methylPREDNISolone  62.5 mg Intravenous Q6H     aspirin  325 mg Oral Daily     insulin (regular)  10 Units Intravenous Once     pantoprazole  40 mg Oral BID AC     nystatin  500,000 Units Swish & Spit 4x Daily     sodium chloride (PF)  3 mL Intracatheter Q8H     metoprolol  100 mg Oral BID     calcitRIOL  0.25 mcg Oral Daily     cholecalciferol  1,000 Units Oral Daily      "fluticasone-vilanterol  1 puff Inhalation Daily     pravastatin  80 mg Oral At Bedtime     ipratropium - albuterol 0.5 mg/2.5 mg/3 mL  3 mL Nebulization 4x daily     polyethylene glycol  17 g Oral BID                  Physical Exam:   Blood pressure 126/60, pulse 92, temperature 97.9  F (36.6  C), temperature source Axillary, resp. rate 24, height 1.52 m (4' 11.84\"), weight 91.9 kg (202 lb 9.6 oz), SpO2 96 %.  Wt Readings from Last 2 Encounters:   04/23/17 91.9 kg (202 lb 9.6 oz)   01/12/17 83.4 kg (183 lb 12.8 oz)     Vital Signs with Ranges  Temp:  [96.6  F (35.9  C)-98.3  F (36.8  C)] 97.9  F (36.6  C)  Heart Rate:  [] 99  Resp:  [16-41] 24  BP: ()/(55-99) 126/60  MAP:  [68 mmHg-95 mmHg] 72 mmHg  Arterial Line BP: ()/(48-70) 125/48  FiO2 (%):  [65 %-100 %] 100 %  SpO2:  [82 %-99 %] 96 %    Constitutional: Intubated and sedated     Lungs: Crackles to auscultation bilaterally, few crackles or wheezing no consolidation   Cardiovascular: Regular rate and rhythm, normal S1 and S2, and no murmur noted   Abdomen: Normal bowel sounds, soft, non-distended, non-tender   Skin: No rashes, no cyanosis, no edema   Other:           Data:   All microbiology laboratory data reviewed.  Recent Labs   Lab Test  04/23/17   0942  04/23/17   0555  04/23/17   0025  04/22/17   2154   WBC   --   21.0*  19.6*  19.1*   HGB  6.7*  7.1*  6.1*  7.8*   HCT   --   22.4*  19.5*  24.9*   MCV   --   84  85  86   PLT   --   174  187  225     Recent Labs   Lab Test  04/23/17   0930  04/23/17   0555  04/23/17   0025   CR  5.64*  6.49*  6.49*     Recent Labs   Lab Test  04/20/17   0644   SED  >140  Results confirmed by repeat test  *     Recent Labs   Lab Test  04/23/17   0240  04/23/17   0230  04/23/17   0225  04/22/17   0903  04/21/17   1210  04/20/17   1018  04/19/17   1550  04/12/17   2208  04/12/17   2206   CULT  No growth after 4 hours  Pending  No growth after 4 hours  >10 Squamous epithelial cells/low power field indicates " oral contamination.   Please recollect.  Canceled, Test credited  Notification of test cancellation was given to Horace Lund RN [facility], @3470   4/22/17. DH.  *  No growth  Canceled, Test credited  >10 Squamous epithelial cells/low power field indicates oral contamination.   Please recollect.  Notification of test cancellation was given to Malia Martinez Rn @ 2485 4/20/17 CS  *  >10 Squamous epithelial cells/low power field indicates oral contamination.   Please recollect.  Canceled, Test credited  Notification of test cancellation was given to Ursula Jennings RN @20:35 on 4/19/17,  KO  *  No growth  No growth

## 2017-04-23 NOTE — PLAN OF CARE
Problem: Goal Outcome Summary  Goal: Goal Outcome Summary  Outcome: No Change  ICU End of Shift Summary.  For vital signs and complete assessments, please see documentation flowsheets.      Pertinent assessments: vent at 85% fio2. Epi, norepi, fent and levo all infusing. Benoit patent, although no urine output. Bladder scanned with 0 results. Patient c/o lower abd pain prior to code.  Major Shift Events: Patient coded x2 this shift. See code notes in chart. ET tube placement. Central line placement. Art line placement. Benoit placed. 2 u prbc given. Albumin also given for low urine output. NG tube placed to LIS. Abd distended and firm, tele hub notified of changes.   Plan (Upcoming Events): TBD  Discharge/Transfer Needs: TBD     Bedside Shift Report Completed :   Bedside Safety Check Completed:

## 2017-04-23 NOTE — PROCEDURES
Right IJ central line placement done under sterile conditions with patient in trendelenberg position    Standard seldinger technique used with ultrasound guidance.   7 F 3 lumen catheter placed at 15cm at the skin.    The central was transduced also to confirm IJ placement.  The patient tolerated the procedure well. The central line was sutured into place.

## 2017-04-23 NOTE — PROGRESS NOTES
" Renal Medicine Progress Note            Assessment/Plan:     1. Patient with baseline CKD 4. Baseline Scr ~ 2.5-3 mg/dl.      2. Acute renal failure. Pt was admitted with a serum creatinine of 2.9 mg/dl. Acute kidney injury attributed to over diuresed.      3. Respiratory distress/Pulmonary fibrosis. Presumed PNA. Bronch on 4/21 with alveolar hemorrhage. High dose steroid was started.    -I doubt that this is renal-pulm syndrome   -PATRICIA/ANCA negative   -RF upper limit of normal   -anti-GBM ordered   -CXR now looks like ARDS   -On Flolan     4. Shock cardiac arrest from hypoxic respiratory failure. Pt was on three pressors, and he is now down to vaso/NE.    -management per ICU    5. Anemia from alveolar hemorrhage? He is getting intermittent transfusion.   -peripheral smear pending    6. Electrolytes.    -continue to monitor potassium and acid base status    Plan.   1. His son would like everything to be done at this time, including renal replacement therapy. \"Give him a fighting chance,\" he said. I discussed the case with Dr. Santoyo, We agreed to treat the patient here today, and plan for dialysis tomorrow. I explained the risks and benefits to RRT to the family, including his son. They agreed with the plan. I will place an order for IR to put place a tunneled HD CVC tomorrow, unless the patient decompensate tonight. If so, he will need to be transfer to the [facility] or [facility] for urgent CRRT.    2. Start d5  + 100 meq bicarb at 75 cc/hr    3. Check CMP and Hgb.          Interval History:     Patient code two times due to hypoxic respiratory distress. He developed refractory shock after that. He was no three pressors, and now now to NE and vaso. SBP is soft. CXR looks like ARDS pattern. He is on Flolan. FIO2 decreased from 100% to 90%. Lactic acidosis improved. He had 3 units of prbcs. CT with possible cholecystitis. He has shock liver.          Medications and Allergies:       calcium chloride  1 g Intravenous " "Once     insulin aspart  1-7 Units Subcutaneous Q4H     epoprostenol (FLOLAN) syringe change   Inhalation Q8H     meropenem  500 mg Intravenous Q12H     azithromycin  500 mg Intravenous Q24H     pantoprazole  40 mg Intravenous QAM AC     methylPREDNISolone  62.5 mg Intravenous Q6H     aspirin  325 mg Oral Daily     insulin (regular)  10 Units Intravenous Once     nystatin  500,000 Units Swish & Spit 4x Daily     sodium chloride (PF)  3 mL Intracatheter Q8H     metoprolol  100 mg Oral BID     calcitRIOL  0.25 mcg Oral Daily     cholecalciferol  1,000 Units Oral Daily     fluticasone-vilanterol  1 puff Inhalation Daily     pravastatin  80 mg Oral At Bedtime     ipratropium - albuterol 0.5 mg/2.5 mg/3 mL  3 mL Nebulization 4x daily     polyethylene glycol  17 g Oral BID        Allergies   Allergen Reactions     Lisinopril      Hyperkalemia, elevated renal function            Physical Exam:   Vitals were reviewed  Heart Rate: 79, Blood pressure 126/60, pulse 92, temperature 98.2  F (36.8  C), temperature source Axillary, resp. rate 24, height 1.52 m (4' 11.84\"), weight 91.9 kg (202 lb 9.6 oz), SpO2 97 %.    Wt Readings from Last 3 Encounters:   04/23/17 91.9 kg (202 lb 9.6 oz)   01/12/17 83.4 kg (183 lb 12.8 oz)   12/07/16 85.3 kg (188 lb)       Intake/Output Summary (Last 24 hours) at 04/23/17 1432  Last data filed at 04/23/17 1330   Gross per 24 hour   Intake          2828.67 ml   Output               30 ml   Net          2798.67 ml       GENERAL APPEARANCE: Critically ill  HEENT:  intubated  RESP: No wheezes or crackles.  CV: RRR, nl S1/S2, no MGR  ABDOMEN: distended, soft  EXTREMITIES/SKIN: no rashes/lesions on observed skin; no edema  Neuro: sedated         Data:     CBC RESULTS:     Recent Labs  Lab 04/23/17  0942 04/23/17  0555 04/23/17  0025 04/22/17  2154 04/22/17  1000 04/22/17  0520 04/21/17  0650   WBC  --  21.0* 19.6* 19.1* 15.6* 13.6* 11.7*   RBC  --  2.67* 2.29* 2.90* 2.97* 2.88* 3.12*   HGB 6.7* 7.1* " 6.1* 7.8* 8.1* 7.5* 8.3*   HCT  --  22.4* 19.5* 24.9* 25.7* 24.7* 26.8*   PLT  --  174 187 225 222 200 203       Basic Metabolic Panel:    Recent Labs  Lab 04/23/17  0930 04/23/17  0555 04/23/17  0025 04/22/17  2154 04/22/17  1000 04/22/17  0520   * 144 146* 140 142 142   POTASSIUM 4.2 5.1 5.3 5.7* 4.7 4.4   CHLORIDE 117* 108 109 107 109 109   CO2 14* 15* 19* 13* 20 22   BUN 98* 111* 111* 114* 97* 93*   CR 5.64* 6.49* 6.49* 6.40* 5.03* 4.94*   * 272* 208* 194* 249* 174*   RICHARD 6.2* 7.9* 9.3 7.8* 8.4* 8.3*       INR  Recent Labs  Lab 04/22/17  1000 04/19/17  0855   INR 1.26* 1.26*      Attestation:   I have reviewed today's relevant vital signs, notes, medications, labs and imaging.    Pankaj Valerio MD  [facility] - Nephrology  Office phone :[phone]  Pager: [phone]

## 2017-04-23 NOTE — PLAN OF CARE
Problem: Goal Outcome Summary  Goal: Goal Outcome Summary  SLP- Pt is currently intubated, not appropriate for swallow evaluation.  Will need  for evaluation

## 2017-04-24 NOTE — PROGRESS NOTES
[facility] Intensive Care Progress Note    Problem List  Acute hypoxic respiratory failure  S/p cardiac arrest on 4/22  Alveolar hemorrhage, etiology unclear  New facial droop, code stroke    Date of Service (when I saw the patient): 04/24/2017     Assessment & Plan   Adina Rodriguez is a 81 year old male who was admitted on 4/12/2017. RLL infiltrate on admission CXR, no helpful micro available. CT on 4/19 shows scattered patchy ground glass opacity. Bronchoscopy on 4/21 showed bloody return.  Now in multisystem organ failure secondary to sepsis.      Neurology:  Facial droop:  - ASA only, NO thrombolytics due to alveolar hemorrhage  - needs MRI but unlikely to change course other than define prognosis (see renal failure)    Cardiovascular:  Still on vasopressors, high doses  New atrial fib: -no anticoagulation because of pulmonary hemorrhage  - currently on norepi, vaso  - add flolan inhaled to assist with RV strain    Pulmonary:        Respiratory acidosis - barely compensated with vent rate at 30  Acute hypoxic respiratory failure -    bronchoscopy showed neutrophils and hemorrhage, CRP and ESR very high PATRICIA, ANCA neg   Methylprednisolone 250 qday (day #4 today)    Chronic lung disease, nonspecific fibrosis (not necessarily IPF) - he has bronchiectasis, on Breo. Nonspecific pattern moderate airflow limitation on PFTs. Thought to be possibly related to aspiration or GERD. Swallow eval here normal, could be GERD.   - may benefit from 3% saline nebs and flutter valve as outpatient    Ventilation Mode: CMV/AC  FiO2 (%): 90 %  Rate Set (breaths/minute): 24 breaths/min  Tidal Volume Set (mL): 400 mL  PEEP (cm H2O): 10 cmH2O  Oxygen Concentration (%): 90 %  Resp: 26    Renal  JIMMY on CKD acute metabolic acidosis   Too unstable for hemodialysis.  Would need continuous renal replacement.  Unlikely to change outcome    ID:         Empiric antibiotic therapy: Pip/tazo day #11  - azithromycin added  4/20     Nutrition  Has been NPO due to high flow oxygen: enteral tube placement for feeding  - initiate enteral feeding after CT abdomen if no indication to be NPO    Endocrine:  Glucose controlled with sc insulin: continue to monitor with steroids  -    Heme/Onc:  Acute on chronic anemia follow for need to transfuse such as insufficient oxygen delivery.    DVT Prophylaxis: Pneumatic Compression Devices  GI Prophylaxis: Not indicated (on PPI at home)    Restraints: Restraints for medical healing needed: NO    Family update by me today: no    Nikolay Garnett MD  Acute Care Surgery/Critical Care      Time Spent on this Encounter   Billing:  I spent 30 minutes bedside and on the inpatient unit today managing the critical care of Adina Rodriguez in relation to the issues listed in this note.    Main Plans for Today   CT abdomen  initiate inhaled Flolan  Family discussion re goals of care    Interval History   Cardiac arrest overnight, intubated.     Physical Exam   Temp: 100  F (37.8  C) Temp src: Axillary Temp  Min: 97.9  F (36.6  C)  Max: 100  F (37.8  C) BP: 113/57   Heart Rate: 92 Resp: 26 SpO2: 97 % O2 Device: Mechanical Ventilator    Vitals:    04/22/17 0000 04/23/17 0000 04/24/17 0000   Weight: 83.9 kg (184 lb 15.5 oz) 91.9 kg (202 lb 9.6 oz) 94.1 kg (207 lb 7.3 oz)     I/O last 3 completed shifts:  In: 2878.85 [I.V.:1728.85; Other:300]  Out: 60 [Urine:10; Emesis/NG output:50]    Current Vitals:Temp:  [97.9  F (36.6  C)-100  F (37.8  C)] 100  F (37.8  C)  Heart Rate:  [] 92  Resp:  [12-35] 26  BP: (113)/(57) 113/57  MAP:  [58 mmHg-87 mmHg] 75 mmHg  Arterial Line BP: ()/(39-63) 122/56  FiO2 (%):  [85 %-100 %] 90 %  SpO2:  [90 %-100 %] 97 %   Awake, alert and following commands  PERRL and conjugate gaze  HFNC  Lungs inspiratory rales  H-RR w/o murmur  Abdomen-soft, non tender, non distended  Extremities-warm and no edema  Lines: No erythema or discharge at entry site for No invasive lines  Current lines  are required for patient management    Medications     NaCl 10 mL/hr at 04/24/17 0808     NaCl 10 mL/hr at 04/24/17 0808     fentaNYL 50 mcg/hr (04/24/17 0812)     epoprostenol (FLOLAN) 20 mcg/mL in glycine diluent inhalation solution 20 ng/kg/min (04/24/17 0815)     propofol (DIPRIVAN) infusion 10 mcg/kg/min (04/24/17 0814)     IV fluid REPLACEMENT ONLY       insulin (regular) 3 Units/hr (04/24/17 1032)     IV fluid REPLACEMENT ONLY 60 mL/hr at 04/24/17 0811     norepinephrine 0.3 mcg/kg/min (04/24/17 1019)     vasopressin (PITRESSIN) infusion ADULT (40 mL) 2.4 Units/hr (04/24/17 1146)       calcium chloride  1 g Intravenous Once     epoprostenol (FLOLAN) syringe change   Inhalation Q8H     meropenem  500 mg Intravenous Q12H     azithromycin  500 mg Intravenous Q24H     pantoprazole  40 mg Intravenous QAM AC     methylPREDNISolone  62.5 mg Intravenous Q6H     aspirin  325 mg Oral Daily     nystatin  500,000 Units Swish & Spit 4x Daily     sodium chloride (PF)  3 mL Intracatheter Q8H     metoprolol  100 mg Oral BID     cholecalciferol  1,000 Units Oral Daily     fluticasone-vilanterol  1 puff Inhalation Daily     pravastatin  80 mg Oral At Bedtime     ipratropium - albuterol 0.5 mg/2.5 mg/3 mL  3 mL Nebulization 4x daily     polyethylene glycol  17 g Oral BID       Data     Recent Labs  Lab 04/24/17  0955 04/24/17  0550 04/23/17  2130 04/23/17  1551 04/23/17  1400  04/23/17  0942  04/23/17  0555  04/23/17  0025 04/22/17  2154  04/22/17  1000  04/19/17  0855   WBC  --  20.0*  --   --   --   --   --   --  21.0*  --  19.6* 19.1*  --  15.6*  < >  --    HGB  --  9.0*  --  8.2*  --   --  6.7*  --  7.1*  --  6.1* 7.8*  --  8.1*  < >  --    MCV  --  81  --   --   --   --   --   --  84  --  85 86  --  87  < >  --    PLT  --  120*  --   --   --   --   --   --  174  --  187 225  --  222  < >  --    INR 1.98*  --   --   --   --   --   --   --   --   --   --   --   --  1.26*  --  1.26*   NA  --  143 143 144 141  --   --   < >  144  --  146* 140  --  142  < >  --    POTASSIUM  --  5.2 5.0 4.9 5.3  --   --   < > 5.1  --  5.3 5.7*  --  4.7  < >  --    CHLORIDE  --  108 109 113* 108  --   --   < > 108  --  109 107  --  109  < >  --    CO2  --  21 20 16* 19*  --   --   < > 15*  --  19* 13*  --  20  < >  --    BUN  --  129* 126* 109* 116*  --   --   < > 111*  --  111* 114*  --  97*  < >  --    CR  --  8.94* 8.13* 6.63* 7.11*  --   --   < > 6.49*  --  6.49* 6.40*  --  5.03*  < >  --    ANIONGAP  --  14 14 15* 14  --   --   < > 21*  --  18* 20*  --  13  < >  --    RICHARD  --  6.3* 6.7* 6.4* 7.4*  --   --   < > 7.9*  --  9.3 7.8*  --  8.4*  < >  --    GLC  --  128* 195* 307* 338*  --   --   < > 272*  --  208* 194*  --  249*  < >  --    ALBUMIN  --  3.3* 3.3* 3.0*  --   --   --   --  3.2*  --   --   --   --   --   < >  --    PROTTOTAL  --  7.3 7.1 6.3*  --   --   --   --  7.4  < >  --   --   --   --   --   --    BILITOTAL  --  1.2 1.1 1.5*  --   < >  --   --  1.6*  --   --   --   --   --   --   --    ALKPHOS  --  166* 159* 138  --   --   --   --  165*  < >  --   --   --   --   --   --    ALT  --  981* 1066* 966*  --   --   --   --  994*  < >  --   --   --   --   --   --    AST  --  2572* 3467* 3766*  --   --   --   --  4108*  < >  --   --   --   --   --   --    TROPI  --   --   --   --  0.746*  --   --   --  0.524*  --   --  0.224*  < > 0.156*  --   --    < > = values in this interval not displayed.  Recent Results (from the past 24 hour(s))   US Abdomen Limited    Narrative    ULTRASOUND ABDOMEN LIMITED  4/23/2017 4:18 PM     HISTORY: Question cholecystitis.    COMPARISON: None.    FINDINGS:  Liver is normal in echogenicity without focal lesions.  Gallbladder demonstrates no cholelithiasis. There is mild gallbladder  wall thickening, of uncertain clinical significance given its  incompletely distended state. Extrahepatic bile duct is not seen.  Pancreas is normal where visualized. Right kidney is not seen.      Impression    IMPRESSION:  Mild  gallbladder wall thickening, of uncertain clinical  significance given its incompletely distended state. No cholelithiasis  or convincing cholecystitis demonstrated.    RUDI CHANG MD       Ventilation Mode: CMV/AC  FiO2 (%): 90 %  Rate Set (breaths/minute): 24 breaths/min  Tidal Volume Set (mL): 400 mL  PEEP (cm H2O): 10 cmH2O  Oxygen Concentration (%): 90 %  Resp: 26

## 2017-04-24 NOTE — PROGRESS NOTES
Met with entire family including his wife, daughter, son   and explained that there was little hope of recovery  And that dialysis would not be offered   decision was made to extubate  And to change care to comfort and extubate

## 2017-04-24 NOTE — PLAN OF CARE
Problem: Goal Outcome Summary  Goal: Goal Outcome Summary  Speech Language Therapy Discharge Summary     Reason for therapy discharge:    Change in medical status.     Progress towards therapy goal(s). [facility] for goal details.  Goals not met.  Barriers to achieving goals:   Pt intubated due to decline in medical status. .     Therapy recommendation(s):    Continued therapy is recommended.  Rationale/Recommendations:  Recommend reconsult once pt extubated and appropriate to re-evaluate swallowing.  .

## 2017-04-24 NOTE — PROGRESS NOTES
[facility]  Infectious Disease Progress Note          Assessment and Plan:   IMPRESSION:   1. An 81-year-old [place] immigrant male with subacute worsening respiratory symptoms, possible new right lower lobe infiltrate, overall doubt this is conventional infection, . If infection, concern for more unusual or opportunistic infection.   2. Pulmonary fibrosis, recent diagnosis, some interstitial changes, question chronic infection versus other cause, no pathology, no high-dose steroids.   3. Diabetes mellitus.   4. Congestive heart failure.   5. Acute on chronic renal insufficiency. Worsening progressively  6. New facial droop on 4/22: stroke code, pending imaging, per my exam the droop  is very mild on the left.   7. Coded last night with PEA, intubated in ICU  8 Multiorgan failure      RECOMMENDATIONS:   1. Continue Refugio and  Zithromax for aspiration/conventional coverage. Highly doubt antibiotics a key here  2.  Inflamm markers high, progressive interstial process, relatives and i nterpretor so more hx, I think after this hx even more unlikely this is a conventional infection,Bronch done and so far no positive microbiological data    3. Now with possible stroke, cardiac arrest and respiratory arrest , multiorgan failure            Interval History:   Coded last night with PEA, resuscitated, intubated now abdominal distention concern for GI bleed no clear + cxs, fever to 101, creat into 8s              Medications:       calcium chloride  1 g Intravenous Once     epoprostenol (FLOLAN) syringe change   Inhalation Q8H     meropenem  500 mg Intravenous Q12H     azithromycin  500 mg Intravenous Q24H     pantoprazole  40 mg Intravenous QAM AC     methylPREDNISolone  62.5 mg Intravenous Q6H     aspirin  325 mg Oral Daily     nystatin  500,000 Units Swish & Spit 4x Daily     sodium chloride (PF)  3 mL Intracatheter Q8H     metoprolol  100 mg Oral BID     cholecalciferol  1,000 Units Oral Daily      "fluticasone-vilanterol  1 puff Inhalation Daily     pravastatin  80 mg Oral At Bedtime     ipratropium - albuterol 0.5 mg/2.5 mg/3 mL  3 mL Nebulization 4x daily     polyethylene glycol  17 g Oral BID                  Physical Exam:   Blood pressure 113/57, pulse 92, temperature 100.6  F (38.1  C), temperature source Oral, resp. rate 20, height 1.52 m (4' 11.84\"), weight 94.1 kg (207 lb 7.3 oz), SpO2 90 %.  Wt Readings from Last 2 Encounters:   04/24/17 94.1 kg (207 lb 7.3 oz)   01/12/17 83.4 kg (183 lb 12.8 oz)     Vital Signs with Ranges  Temp:  [98.9  F (37.2  C)-100.6  F (38.1  C)] 100.6  F (38.1  C)  Heart Rate:  [] 125  Resp:  [12-35] 20  BP: (113)/(57) 113/57  MAP:  [58 mmHg-85 mmHg] 83 mmHg  Arterial Line BP: ()/(39-66) 126/66  FiO2 (%):  [85 %-90 %] 90 %  SpO2:  [90 %-100 %] 90 %    Constitutional: Intubated and sedated in ICU    Lungs: Crackles to auscultation bilaterally, few crackles or wheezing no consolidation   Cardiovascular: Regular rate and rhythm, normal S1 and S2, and no murmur noted   Abdomen: Normal bowel sounds, soft, non-distended, non-tender   Skin: No rashes, no cyanosis, no edema   Other:           Data:   All microbiology laboratory data reviewed.  Recent Labs   Lab Test  04/24/17   0550  04/23/17   1551  04/23/17   0942  04/23/17   0555  04/23/17   0025   WBC  20.0*   --    --   21.0*  19.6*   HGB  9.0*  8.2*  6.7*  7.1*  6.1*   HCT  26.7*   --    --   22.4*  19.5*   MCV  81   --    --   84  85   PLT  120*   --    --   174  187     Recent Labs   Lab Test  04/24/17   0550  04/23/17   2130  04/23/17   1551   CR  8.94*  8.13*  6.63*     Recent Labs   Lab Test  04/20/17   0644   SED  >140  Results confirmed by repeat test  *     Recent Labs   Lab Test  04/23/17   0240  04/23/17   0230  04/23/17   0225  04/22/17   0903  04/21/17   1210  04/20/17   1018  04/19/17   1550  04/12/17   2208  04/12/17   2206   CULT  No growth after 1 day  Culture in progress  No growth after 1 day  >10 " Squamous epithelial cells/low power field indicates oral contamination.   Please recollect.  Canceled, Test credited  Notification of test cancellation was given to Horace Lund RN [facility], @9415   4/22/17. DH.  *  No growth  Canceled, Test credited  >10 Squamous epithelial cells/low power field indicates oral contamination.   Please recollect.  Notification of test cancellation was given to Malia Martinez Rn @ 6959 4/20/17 CS  *  >10 Squamous epithelial cells/low power field indicates oral contamination.   Please recollect.  Canceled, Test credited  Notification of test cancellation was given to Ursula Jennings RN @20:35 on 4/19/17,  KO  *  No growth  No growth

## 2017-04-24 NOTE — PROGRESS NOTES
"CLINICAL NUTRITION SERVICES - REASSESSMENT NOTE      Recommendations Ordered by Registered Dietitian (RD):   -Recommend initiation of EN once patient hemodynamically stable if it aligns with overall POC         EVALUATION OF PROGRESS TOWARD GOALS   Diet:  Renal Diet   Intake:  Patient with \"fair\" intake while able to take PO as demonstrated by consumption of ~75% of meals TID. Patient intubated on 4/23 with limited opportunity for PO x ~48 hours.     Dosing Weight: Energy - 83.1 kg (actual weight) / Protein - 48.2 kg (IBW)    ASSESSED NUTRITION NEEDS (PER APPROVED PRACTICE GUIDELINES):  Estimated Energy Needs: 914-1163 kcals (11-14 Kcal/Kg)  Justification: maintenance  Estimated Protein Needs:  grams protein (1.2-2 g pro/Kg)   Justification: Hypercatabolism with critical illness / CKD   Estimated Fluid Needs: 1 mL/Kcal  Justification: maintenance or per provider pending fluid status      NEW FINDINGS:   -4/20 - Patient with increased breathing difficulties / desaturation on HFNC  -4/21 - Brochoscopy (Pt NPO @ 0400) --> demonstrated more blood in airways than expected for just epistaxis   -4/22 - Code stroke called for left-sided facial droop. Head CT negative and MRI on hold d/t respiratory status   -4/22 @ 2136 - Code Blue called --> CPR successful and patient intubated d/t further LOC. Chest XR demonstrated twisted trachea. Pt again lost pulse x 2 and required additional CPR x >/= 6 minutes   -4/23 - NGT placed to LIS. Abdomen firm / distended    -4/23 - Abdominal CT - Gallbladder wall thickening + associated fat stranding surrounding the gallbladder. Fatty atrophy of the pancreas. Small amount of free fluid in paracolic gutter. No areas of bowel wall thickening or bowel dilatation.   -4/23 - Abdominal US - Mild gallbladder wall thickening, but no cholelithiasis or convincing cholecystitis demonstrated      -Biochemical Review:    Na+ and K+ wnl    Cr - 8.94 mg/dL (H, increasing), GFR - 6 (L, declining) --> " Nephrology following. Patient not an appropriate candidate for conventional HD at this time given unstable condition    -Medication Review:    Methylprednisonline - 62.5 mg q 6 hours     Miralax BID    D5W with sodium bicarb 150 mEq/L @ 60 ml/hr (245 kcals from dex)    Flolan gtt    Fentynl gtt     Insulin gtt    Levophed and vasopressin gtt    Propofol @ 5.5 ml/hr (145 kcals from lipids)    Previous Goals:   Patient to continue to consume % of nutritionally adequate meal trays TID, or the equivalent with supplements/snacks.  Evaluation: Not met    Previous Nutrition Diagnosis:   No previous nutrition diagnosis identified   Evaluation: Declining      MALNUTRITION  % Weight Loss:  None noted  % Intake:  No decreased intake noted with the exception of the past two days   Subcutaneous Fat Loss:  Unable to assess   Muscle Loss:  Unable to assess   Fluid Retention:  Moderate - 3+ edema to BL hands     Malnutrition Diagnosis: Unable to determine due to insufficient information    CURRENT NUTRITION DIAGNOSIS  Inadequate protein-energy intake related to inability for PO secondary to intubation/sedation as evidenced by current intake (Propofol + IVF) providing 43% of estimated energy needs and 0% of estimated protein needs    INTERVENTIONS  Recommendations / Nutrition Prescription  1. Pending POC, recommend initiation of EN once hemodynamically stable.     Goal EN Regimen/Provisions: Peptamen Intense VHP @  30 ml/hr x 24 hours (720 ml) to provide 720 kcals (79% of estimated energy needs), 67 g protein (1.4 g/kg), 56 g CHO, 3 g fiber, 604 ml free water and <100% of DRIs    2. Free water flushes = 60 ml q 4 hours for tube patency    3. Renal MVI+mineral     Implementation  EN Composition, EN Schedule / Feeding Tube Flush - Provided recommendations for EN and free water flushes as indicated above. Will order upon MD request     Multivitamin/Mineral - Ordered renal MVI    Collaboration and Referral of Nutrition care -  Discussed patient during interdisciplinary rounds.     Goals  Determination of POC x 24 hours     MONITORING AND EVALUATION:  Beliefs and attitudes vs Enteral access - Decision regarding POC vs need for EN    Stephany Borden RD, LD  Clinical Dietitian  3rd Floor/ICU Pager: [phone]  All Other Floors Pager: [phone]  Weekend/Holiday Pager: [phone]

## 2017-04-24 NOTE — PROGRESS NOTES
I have spoken with Gris Garnett and Rosa about Mr. Rodriguez's acute on chronic renal failure.  I agree with Dr. Garnett that he is too unstable for conventional hemodialysis.  I will stand by.    Carlos Espinal MD  Nephrology; [employer]  [phone]

## 2017-04-24 NOTE — PLAN OF CARE
Problem: Goal Outcome Summary  Goal: Goal Outcome Summary  Outcome: No Change  ICU End of Shift Summary.  For vital signs and complete assessments, please see documentation flowsheets.      Pertinent assessments: levo, versed, propofol, fent and insulin drips all infusing. Sod bicarb infusing. Benoit, no urine output this shift. Art line patent. cvp patent. Central line wnl. PCD for dvt prophylaxis. Vent at 90% fio2 with flolan. NG to suction. Son stayed in room throughout the night. Red streaked sputum when suctioned. Bath and oral care done. Repositioned every 2 hours.   Major Shift Events: possible dialysis  Plan (Upcoming Events): TBD  Discharge/Transfer Needs:      Bedside Shift Report Completed :   Bedside Safety Check Completed:

## 2017-04-24 NOTE — PROGRESS NOTES
After extubation and discontinuation of vasopressin and propofol  Initial wheezing and increased work of breathing  Treated with IV dilaudid for air hunger    Then developed respiratory arrest  And several minutes later followed by asystole on the monitor  Pronounced  at 16:53 pm

## 2017-04-24 NOTE — PROGRESS NOTES
Patient remains on full ventilator support and full strength flolan running inline    Ventilation Mode: CMV/AC  FiO2 (%): 90 %  Rate Set (breaths/minute): 24 breaths/min  Tidal Volume Set (mL): 400 mL  PEEP (cm H2O): 10 cmH2O  Oxygen Concentration (%): 90 %  Resp: 27      No ventilator changes made this shift did adjust Fi02 with ABGS, suctioning nico red blood through ETT, breath sounds are coarse bilaterally, Received scheduled Duoneb, Unable to obtain auto PEEP or plateau due to patient fighting test.    Jacinta Irwin  April 24, 2017.5:44 AM

## 2017-04-25 LAB — DSDNA AB SER-ACNC: 1 IU/ML

## 2017-04-27 LAB
BACTERIA SPEC CULT: ABNORMAL
BLD PROD TYP BPU: NORMAL
BLD UNIT ID BPU: 0
BLOOD PRODUCT CODE: NORMAL
BPU ID: NORMAL
Lab: ABNORMAL
MICRO REPORT STATUS: ABNORMAL
MICROORGANISM SPEC CULT: ABNORMAL
SPECIMEN SOURCE: ABNORMAL
TRANSFUSION STATUS PATIENT QL: NORMAL
TRANSFUSION STATUS PATIENT QL: NORMAL

## 2017-04-28 NOTE — DISCHARGE SUMMARY
DISCHARGE AND DEATH SUMMARY      DATE OF ADMISSION: 2017   DATE OF DEATH:  2017       PRIMARY CARE PHYSICIAN:   Keon Mathis M.D., [facility].      DISCHARGE DIAGNOSES AND CAUSE OF DEATH;   1.  Presented with acute/subacute hypoxic respiratory failure.    2.  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    3.  Initial concern for possible recurrent aspiration leading to an initial right lower lung infiltrate, treated with IV Zosyn.   4.  Type 2 diabetes on insulin.    5.  Chronic kidney disease, Stage III- IV.   6.  Developed acute renal failure but unable to be dialyzed because of persistent hypotension.    7.  Acute diastolic heart failure on chronic.    8.  Marked hypotension, on norepinephrine and Vasopressin.    9.  Decision was made to terminally extubate and comfort care as irreversible multiorgan system failure was present.   10.  The patient was pronounced  2017 at 4:53 p.m.      HOSPITAL COURSE:  Mr. Adina Rodriguez is an 81-year-old [place] immigrant with a history of type 2 diabetes (on insulin), stage IV kidney disease from hypertension and diabetes, hypertension, and who presented with increasing shortness of breath provoked by walking 100 feet.  He had these type of symptoms for about 2 months, with increase in severity and was admitted for acute/subacute hypoxic respiratory failure.  His CT showed a ground glass alveolar infiltrate in the right lower lung.   This was seen on a CT scan one month before, along with some peripheral fibrosis.  Patient had seen Pulmonary as an outpatient and they were concerned that this was possibly caused by aspiration pneumonia by recurrent aspiration.  The family reported that the patient often gulped down his food and would then begin coughing.        On admission, he was started on Zosyn; however, he did not have any fevers, his cough was dry and nonproductive.  He  had bilateral coarse inspiratory and expiratory rales.  Whenever he ambulated short distances he would drop his sats in the 80% range.  He had known chronic kidney disease, stage IV, with a baseline creatinine around 3 and came in on daily Bumex.  His creatinine increased to 2.67 from an admission creatinine of 2.5, and his Bumex was held.  Several days later he then developed increased work of breathing and paroxysmal nocturnal dyspnea and orthopnea on 04/15/2017, along with an increased sinus tachycardia of 136.  He was treated with 2 mg of IV Bumex and an additional metoprolol 10 mg IV and then started on metoprolol 25 q.12h.  He was then kept on Bumex 2 mg IV and Renal was consulted because of the chronic renal failure and increasing creatinine.  His level of shortness of breath continued to increase, so that he was even short of breath with minimal activity.  Pulmonary was consulted.  A bronchoscopy was performed.  The aspirate was blood tinged with neutrophils and he was then placed on steroids.  His work of breathing continued to increase after the bronchoscopy by that point he was in the Intensive Care Unit on 04/22 at 10:00 p.m. while on 70 % high flow of 40 liters per minute his respiratory rate was 30 and he then developed cardiac arrest requiring immediate CPR.        He was then intubated but again lost his pulses with resumption of CPR; he then developed a wide complex tachycardia and was defibrillated and was hypotensive requiring norepinephrine and Vasopressin drip along with an epinephrine drip.  His blood gases just prior to his arrest were 7.25, pCO2 of 46, pO2 of 89.  His lactic acid then came back at 8.7.  He then had acute renal failure and required norepinephrine and vasopressin drip.  The lactic acid did improve to 2.  His liver enzymes were high from shock liver.      It was felt that at 81 years old with multiorgan system failure that of the lungs, the heart and the kidneys that he had  little chance of survival.  Regular hemodialysis could not be done because of the hypotension on vasopressors.  A family conference was held with his wife, daughter and son and the decision was made to allow death to occur.  He was subsequently extubated but given IV Dilaudid and Ativan and the vasopressin and propofol were discontinued.  He initially had some increased work of breathing and wheezing, then developed respiratory arrest followed several minutes later by asystole and was pronounced  at 1653.  All the family members were in the room including his daughter, son and wife and they understood the cause of his death.      NOTE:  Greater than 30 minutes was spent on discharge summary and paperwork.         MARCIANO DESIR MD             D: 2017 09:17   T: 2017 11:17   MT: [id number]      Name:     AIDAN DORAN   MRN:      [id number]        Account:        [id number]   :      1935           Admit Date:     [id number]                                  Discharge Date: 2017      Document: [id number]       cc: Keon Mathis MD

## 2017-04-29 LAB
BACTERIA SPEC CULT: NO GROWTH
BACTERIA SPEC CULT: NO GROWTH
Lab: NORMAL
Lab: NORMAL
MICRO REPORT STATUS: NORMAL
MICRO REPORT STATUS: NORMAL
SPECIMEN SOURCE: NORMAL
SPECIMEN SOURCE: NORMAL

## 2017-06-16 LAB
MICRO REPORT STATUS: NORMAL
MYCOBACTERIUM SPEC CULT: NORMAL
SPECIMEN SOURCE: NORMAL

## 2022-05-27 NOTE — PROCEDURES
Right groin prepped and draped.    Using ultrasound guidance, the right femoral line was accessed using a standard seldinger technique.   The catheter was sutured securely and covered with tegarderm and biopatch.     The arterial line tracing was confirmed. The patient tolerated the procedure well   tympanic

## 2022-12-06 NOTE — PLAN OF CARE
Problem: Goal Outcome Summary  Goal: Goal Outcome Summary  Outcome: No Change  Start of shift pt on 7L via oxymizer. 1700 pt sitting on SOB, desat to 83%. After coughing and deep breathing sats remain mid 80s. Placed on oxymask 15L, recovered to 93%. RT at bedside, received neb, placed back on oxymizer at 10L. 1730 pt desat to 79%. Minutes of coughing and deep breathing and sats remain 83%. Placed on Oxymask 12L sats 93%.     Pt c/o feeling congested. PRN tessalon and ocean spray. Bumex stopped by nephrology r/t rising creat. ID following. Pulmonology consult. Possible bronch tomorrow. NPO @ [id number]    2230: Continues on 13L oxymask sats 90-91%. Pt c/o very dry nose. RT following. Paged Rt to inquire if pt would be a good candidate for HFNC.       details…

## (undated) DEVICE — TUBING SUCTION 12"X1/4" N612

## (undated) DEVICE — SOL NACL 0.9% IRRIG 1000ML BOTTLE 2F7124

## (undated) DEVICE — ENDO NDL ASPIRATION EXCELON TRANSBRONCH 21GA 15MM

## (undated) DEVICE — TUBING OXYGEN CANNULA NASAL 7FT

## (undated) DEVICE — SPECIMEN TRAP MUCOUS SIMILAC NTRL CARE GRAD 80ML 0045860

## (undated) DEVICE — DRSG TELFA 3X8" 1238

## (undated) DEVICE — SPECIMEN CONTAINER 3OZ

## (undated) DEVICE — APPLICATOR COTTON TIP 6"X2 STERILE LF 6012

## (undated) DEVICE — SYR 10ML SLIP TIP W/O NDL

## (undated) DEVICE — GLOVE BIOGEL 6.5 LATEX

## (undated) RX ORDER — LIDOCAINE HYDROCHLORIDE 40 MG/ML
INJECTION, SOLUTION RETROBULBAR
Status: DISPENSED
Start: 2017-01-01

## (undated) RX ORDER — LIDOCAINE HYDROCHLORIDE 10 MG/ML
INJECTION, SOLUTION INFILTRATION; PERINEURAL
Status: DISPENSED
Start: 2017-01-01